# Patient Record
Sex: MALE | URBAN - METROPOLITAN AREA
[De-identification: names, ages, dates, MRNs, and addresses within clinical notes are randomized per-mention and may not be internally consistent; named-entity substitution may affect disease eponyms.]

---

## 2018-04-19 ENCOUNTER — APPOINTMENT (RX ONLY)
Dept: URBAN - METROPOLITAN AREA CLINIC 7 | Facility: CLINIC | Age: 33
Setting detail: DERMATOLOGY
End: 2018-04-19

## 2018-04-19 DIAGNOSIS — L66.2 FOLLICULITIS DECALVANS: ICD-10-CM

## 2018-04-19 DIAGNOSIS — Z71.89 OTHER SPECIFIED COUNSELING: ICD-10-CM

## 2018-04-19 DIAGNOSIS — Z87.891 PERSONAL HISTORY OF NICOTINE DEPENDENCE: ICD-10-CM

## 2018-04-19 DIAGNOSIS — L81.4 OTHER MELANIN HYPERPIGMENTATION: ICD-10-CM

## 2018-04-19 PROBLEM — L30.9 DERMATITIS, UNSPECIFIED: Status: ACTIVE | Noted: 2018-04-19

## 2018-04-19 PROCEDURE — 99202 OFFICE O/P NEW SF 15 MIN: CPT | Mod: 25

## 2018-04-19 PROCEDURE — 11100: CPT

## 2018-04-19 PROCEDURE — ? BIOPSY BY PUNCH METHOD

## 2018-04-19 PROCEDURE — ? SUNSCREEN RECOMMENDATIONS

## 2018-04-19 PROCEDURE — ? COUNSELING

## 2018-04-19 ASSESSMENT — LOCATION DETAILED DESCRIPTION DERM
LOCATION DETAILED: RIGHT CENTRAL PARIETAL SCALP
LOCATION DETAILED: RIGHT DISTAL POSTERIOR UPPER ARM
LOCATION DETAILED: LEFT DISTAL POSTERIOR UPPER ARM

## 2018-04-19 ASSESSMENT — LOCATION ZONE DERM
LOCATION ZONE: ARM
LOCATION ZONE: SCALP

## 2018-04-19 ASSESSMENT — LOCATION SIMPLE DESCRIPTION DERM
LOCATION SIMPLE: LEFT UPPER ARM
LOCATION SIMPLE: RIGHT UPPER ARM
LOCATION SIMPLE: SCALP

## 2018-04-19 NOTE — PROCEDURE: COUNSELING
Detail Level: Zone
Detail Level: Detailed
Quality 226: Preventive Care And Screening: Tobacco Use: Screening And Cessation Intervention: Patient screened for tobacco and is a smoker AND received Cessation Counseling

## 2018-04-19 NOTE — PROCEDURE: BIOPSY BY PUNCH METHOD
Anesthesia Type: 1% lidocaine with epinephrine
Wound Care: Bacitracin
Home Suture Removal Text: Patient was provided a home suture removal kit and will remove their sutures at home.  If they have any questions or difficulties they will call the office.
Size Of Lesion In Cm (Optional): 0
Post-Care Instructions: I reviewed with the patient in detail post-care instructions. Patient is to keep the biopsy site dry overnight, and then apply bacitracin twice daily until healed. Patient may apply hydrogen peroxide soaks to remove any crusting.
Bill 50556 For Specimen Handling/Conveyance To Laboratory?: no
Epidermal Sutures: 4-0 Ethilon
Notification Instructions: Patient will be notified of biopsy results. However, patient instructed to call the office if not contacted within 2 weeks.
Hemostasis: None
Consent: Written consent was obtained and risks were reviewed including but not limited to scarring, infection, bleeding, scabbing, incomplete removal, nerve damage and allergy to anesthesia.
Punch Size In Mm: 4
Was A Bandage Applied: Yes
Lab Facility: [id number]
Biopsy Type: H and E
Lab: [id number]
Anesthesia Volume In Cc: 0.5
Billing Type: Third-Party Bill
Detail Level: Detailed
Suture Removal: 14 days
Dressing: bandage

## 2018-04-24 ENCOUNTER — APPOINTMENT (RX ONLY)
Dept: URBAN - METROPOLITAN AREA CLINIC 7 | Facility: CLINIC | Age: 33
Setting detail: DERMATOLOGY
End: 2018-04-24

## 2018-04-24 DIAGNOSIS — Z48.02 ENCOUNTER FOR REMOVAL OF SUTURES: ICD-10-CM

## 2018-04-24 PROBLEM — L20.84 INTRINSIC (ALLERGIC) ECZEMA: Status: ACTIVE | Noted: 2018-04-24

## 2018-04-24 PROBLEM — L85.3 XEROSIS CUTIS: Status: ACTIVE | Noted: 2018-04-24

## 2018-04-24 PROBLEM — L55.1 SUNBURN OF SECOND DEGREE: Status: ACTIVE | Noted: 2018-04-24

## 2018-04-24 PROBLEM — L29.8 OTHER PRURITUS: Status: ACTIVE | Noted: 2018-04-24

## 2018-04-24 PROCEDURE — ? SUTURE REMOVAL (GLOBAL PERIOD)

## 2018-04-24 PROCEDURE — 99024 POSTOP FOLLOW-UP VISIT: CPT

## 2018-04-24 PROCEDURE — ? COUNSELING

## 2018-04-24 ASSESSMENT — LOCATION SIMPLE DESCRIPTION DERM: LOCATION SIMPLE: SCALP

## 2018-04-24 ASSESSMENT — LOCATION ZONE DERM: LOCATION ZONE: SCALP

## 2018-04-24 ASSESSMENT — LOCATION DETAILED DESCRIPTION DERM: LOCATION DETAILED: RIGHT CENTRAL PARIETAL SCALP

## 2018-04-24 NOTE — PROCEDURE: SUTURE REMOVAL (GLOBAL PERIOD)
Detail Level: Detailed
Add 26786 Cpt? (Important Note: In 2017 The Use Of 52499 Is Being Tracked By Cms To Determine Future Global Period Reimbursement For Global Periods): yes
Body Location Override (Optional - Billing Will Still Be Based On Selected Body Map Location If Applicable): right central Parietal scalp

## 2018-05-02 ENCOUNTER — RX ONLY (OUTPATIENT)
Age: 33
Setting detail: RX ONLY
End: 2018-05-02

## 2018-05-02 RX ORDER — CLINDAMYCIN PHOSPHATE 10 MG/ML
1 SOLUTION TOPICAL BID X 2 WEEKS
Qty: 1 | Refills: 3 | Status: ERX

## 2018-05-02 RX ORDER — DOXYCYCLINE HYCLATE 100 MG/1
1 TABLET, COATED ORAL BID
Qty: 60 | Refills: 0 | Status: ERX | COMMUNITY
Start: 2018-05-02

## 2019-07-31 NOTE — PROGRESS NOTES
Subjective:       Patient ID: Lisa Garcia is a 34 y.o. male with no significant PMH who presents today to establish care.    Chief Complaint: Hernia (screening and referrals for derm and ortho) and Ankle Injury (right ankle)    HPI   Patient with complaints of scalp condition over 10 years - folliculitis - seen by multiple Dermatologist and biopsies done.  Uses Clindamycin topical.  Wants to see another Dermatologist.  Also with a genital warts and has a new lesion.      Has right ankle pain since high school due to sports - susceptible to easy injury.  Will refer to Ortho.    Has chronic LBP with right sciatica.  He wants to see [facility].    Has right testicular pain after wearing a harness for rock climbing.      Patient denies f/c, n/v/d.  No chest pain or SOB.  No abdominal pain or dysuria.  No headaches or change in vision.  No dizziness.  No significant  weight gain or weight loss.  Remaining ROS negative.    Review of Systems   Constitutional: Negative for activity change, appetite change, diaphoresis, fatigue and unexpected weight change.   HENT: Negative for congestion, ear pain, hearing loss, rhinorrhea, sinus pressure, sore throat, trouble swallowing and voice change.    Eyes: Negative for photophobia, pain, discharge and visual disturbance.   Respiratory: Negative for cough, chest tightness, shortness of breath and wheezing.    Cardiovascular: Negative for chest pain, palpitations and leg swelling.   Gastrointestinal: Negative for abdominal pain, blood in stool, constipation, diarrhea, nausea and vomiting.   Endocrine: Negative for cold intolerance, heat intolerance, polydipsia, polyphagia and polyuria.   Genitourinary: Positive for testicular pain (right). Negative for decreased urine volume, difficulty urinating, discharge, dysuria, flank pain, hematuria, scrotal swelling and urgency.   Musculoskeletal: Positive for arthralgias (right ankle) and back pain. Negative for joint swelling, myalgias  and neck pain.   Skin: Positive for rash (scalp).   Neurological: Negative for dizziness, tremors, syncope, weakness, numbness and headaches.   Hematological: Does not bruise/bleed easily.   Psychiatric/Behavioral: Negative for agitation, confusion, dysphoric mood and sleep disturbance. The patient is not nervous/anxious.        Objective:      Physical Exam   Constitutional: He is oriented to person, place, and time. He appears well-developed and well-nourished.   HENT:   Head: Normocephalic.   Nose: Nose normal.   Mouth/Throat: Oropharynx is clear and moist.   Eyes: Pupils are equal, round, and reactive to light. Conjunctivae and EOM are normal. Right eye exhibits no discharge. Left eye exhibits no discharge. No scleral icterus.   Neck: Normal range of motion. Neck supple. No thyromegaly present.   Cardiovascular: Normal rate, regular rhythm, normal heart sounds and intact distal pulses. Exam reveals no gallop and no friction rub.   No murmur heard.  Pulmonary/Chest: Effort normal and breath sounds normal. No respiratory distress. He has no wheezes. He has no rales.   Abdominal: Soft. Bowel sounds are normal. He exhibits no distension. There is no tenderness. There is no rebound and no guarding.   Genitourinary: Penis normal.   Musculoskeletal: Normal range of motion. He exhibits no edema.   Lymphadenopathy:     He has no cervical adenopathy.   Neurological: He is alert and oriented to person, place, and time. No cranial nerve deficit or sensory deficit.   Skin: Skin is warm and dry. No rash noted. No erythema.   Folliculitis on scalp with MBP.   Psychiatric: He has a normal mood and affect. His behavior is normal. Thought content normal.       Assessment:       1. Annual physical exam    2. Alopecia of scalp    3. Chronic pain of right ankle    4. Chronic bilateral low back pain with right-sided sciatica    5. Class 1 obesity with body mass index (BMI) of 31.0 to 31.9 in adult, unspecified obesity type,  unspecified whether serious comorbidity present    6. Dissecting folliculitis of scalp    7. Screen for STD (sexually transmitted disease)    8. Right testicular pain        Plan:   -Today's Visit - patient is awake and alert.    -Nutrition - Obesity - BMI 31.66 today.  Discussed diet modification and exercise.    -Derm - Patient with complaints of scalp condition over 10 years - folliculitis - seen by multiple Dermatologist and biopsies done.  Uses Clindamycin topical.  Has hair loss associated with this.  Wants to see another Dermatologist - will refer to Nat.  Also with a genital warts and has a new lesion.      -MSK - Has right ankle pain since high school due to sports - susceptible to easy injury.  Will refer to Ortho - wants to see Martha.    Has chronic LBP with right sciatica.  He wants to see Back [facility] Sp[facility].  He has gone to ED several times with pain.    - - Right Testicular pain - Went rock climbing a week ago and has pain on right inguinal area after wearing harness.  Exam negative.  Will check US.    We discussed STD screening - will draw today.    -HCM - Discussed Flu and Tdap (2015) Vaccines.      -Follow Up - 6 months

## 2019-08-02 ENCOUNTER — OFFICE VISIT (OUTPATIENT)
Dept: PRIMARY CARE CLINIC | Facility: CLINIC | Age: 34
End: 2019-08-02
Payer: COMMERCIAL

## 2019-08-02 VITALS
SYSTOLIC BLOOD PRESSURE: 112 MMHG | HEART RATE: 84 BPM | HEIGHT: 73 IN | DIASTOLIC BLOOD PRESSURE: 82 MMHG | BODY MASS INDEX: 31.81 KG/M2 | WEIGHT: 240 LBS | OXYGEN SATURATION: 97 %

## 2019-08-02 DIAGNOSIS — L66.3 DISSECTING FOLLICULITIS OF SCALP: ICD-10-CM

## 2019-08-02 DIAGNOSIS — Z11.3 SCREEN FOR STD (SEXUALLY TRANSMITTED DISEASE): ICD-10-CM

## 2019-08-02 DIAGNOSIS — E66.9 CLASS 1 OBESITY WITH BODY MASS INDEX (BMI) OF 31.0 TO 31.9 IN ADULT, UNSPECIFIED OBESITY TYPE, UNSPECIFIED WHETHER SERIOUS COMORBIDITY PRESENT: ICD-10-CM

## 2019-08-02 DIAGNOSIS — Z00.00 ANNUAL PHYSICAL EXAM: Primary | ICD-10-CM

## 2019-08-02 DIAGNOSIS — N50.811 RIGHT TESTICULAR PAIN: ICD-10-CM

## 2019-08-02 DIAGNOSIS — G89.29 CHRONIC BILATERAL LOW BACK PAIN WITH RIGHT-SIDED SCIATICA: ICD-10-CM

## 2019-08-02 DIAGNOSIS — L65.9 ALOPECIA OF SCALP: ICD-10-CM

## 2019-08-02 DIAGNOSIS — G89.29 CHRONIC PAIN OF RIGHT ANKLE: ICD-10-CM

## 2019-08-02 DIAGNOSIS — M25.571 CHRONIC PAIN OF RIGHT ANKLE: ICD-10-CM

## 2019-08-02 DIAGNOSIS — M54.41 CHRONIC BILATERAL LOW BACK PAIN WITH RIGHT-SIDED SCIATICA: ICD-10-CM

## 2019-08-02 PROCEDURE — 3008F PR BODY MASS INDEX (BMI) DOCUMENTED: ICD-10-PCS | Mod: CPTII,S$GLB,, | Performed by: INTERNAL MEDICINE

## 2019-08-02 PROCEDURE — 3008F BODY MASS INDEX DOCD: CPT | Mod: CPTII,S$GLB,, | Performed by: INTERNAL MEDICINE

## 2019-08-02 PROCEDURE — 99204 PR OFFICE/OUTPT VISIT, NEW, LEVL IV, 45-59 MIN: ICD-10-PCS | Mod: S$GLB,,, | Performed by: INTERNAL MEDICINE

## 2019-08-02 PROCEDURE — 99204 OFFICE O/P NEW MOD 45 MIN: CPT | Mod: S$GLB,,, | Performed by: INTERNAL MEDICINE

## 2019-08-02 PROCEDURE — 99999 PR PBB SHADOW E&M-NEW PATIENT-LVL V: ICD-10-PCS | Mod: PBBFAC,,, | Performed by: INTERNAL MEDICINE

## 2019-08-02 PROCEDURE — 99999 PR PBB SHADOW E&M-NEW PATIENT-LVL V: CPT | Mod: PBBFAC,,, | Performed by: INTERNAL MEDICINE

## 2019-08-02 RX ORDER — CLINDAMYCIN PHOSPHATE 11.9 MG/ML
SOLUTION TOPICAL 2 TIMES DAILY
COMMUNITY
End: 2019-08-02 | Stop reason: SDUPTHER

## 2019-08-02 RX ORDER — CLINDAMYCIN PHOSPHATE 11.9 MG/ML
SOLUTION TOPICAL 2 TIMES DAILY
Qty: 1 BOTTLE | Refills: 3 | Status: SHIPPED | OUTPATIENT
Start: 2019-08-02 | End: 2021-06-11 | Stop reason: SDUPTHER

## 2019-08-02 NOTE — PATIENT INSTRUCTIONS
Your exam was overall normal today.    Your blood pressure was good.    I will order routine fasting labs today - at least 9 hours of fasting.    We will give you the Flu Vaccine today.  We will give you the Tdap Vaccine today.    I will refer you to Dermatology.  I will refer your To Martha Jaffe, for your right ankle.  I will refer you to [facility].    Return in 12 months - sooner if needed.  Please come at least 15-20 minutes before your scheduled appointment time.

## 2019-08-03 ENCOUNTER — HOSPITAL ENCOUNTER (OUTPATIENT)
Dept: RADIOLOGY | Facility: OTHER | Age: 34
Discharge: HOME | End: 2019-08-03
Attending: INTERNAL MEDICINE
Payer: COMMERCIAL

## 2019-08-03 DIAGNOSIS — N50.811 RIGHT TESTICULAR PAIN: ICD-10-CM

## 2019-08-03 PROCEDURE — 76870 US EXAM SCROTUM: CPT | Mod: TC

## 2019-08-03 PROCEDURE — 76870 US SCROTUM AND TESTICLES: ICD-10-PCS | Mod: 26,,, | Performed by: RADIOLOGY

## 2019-08-03 PROCEDURE — 76870 US EXAM SCROTUM: CPT | Mod: 26,,, | Performed by: RADIOLOGY

## 2019-08-04 ENCOUNTER — PATIENT MESSAGE (OUTPATIENT)
Dept: PRIMARY CARE CLINIC | Facility: CLINIC | Age: 34
End: 2019-08-04

## 2019-08-06 ENCOUNTER — PATIENT MESSAGE (OUTPATIENT)
Dept: PRIMARY CARE CLINIC | Facility: CLINIC | Age: 34
End: 2019-08-06

## 2019-08-06 ENCOUNTER — LAB VISIT (OUTPATIENT)
Dept: LAB | Facility: HOSPITAL | Age: 34
End: 2019-08-06
Attending: INTERNAL MEDICINE
Payer: COMMERCIAL

## 2019-08-06 DIAGNOSIS — Z11.3 SCREEN FOR STD (SEXUALLY TRANSMITTED DISEASE): ICD-10-CM

## 2019-08-06 DIAGNOSIS — Z00.00 ANNUAL PHYSICAL EXAM: ICD-10-CM

## 2019-08-06 LAB
BILIRUB UR QL STRIP: NEGATIVE
C TRACH DNA SPEC QL NAA+PROBE: NOT DETECTED
CLARITY UR REFRACT.AUTO: ABNORMAL
COLOR UR AUTO: YELLOW
GLUCOSE UR QL STRIP: NEGATIVE
HGB UR QL STRIP: NEGATIVE
KETONES UR QL STRIP: NEGATIVE
LEUKOCYTE ESTERASE UR QL STRIP: NEGATIVE
N GONORRHOEA DNA SPEC QL NAA+PROBE: NOT DETECTED
NITRITE UR QL STRIP: NEGATIVE
PH UR STRIP: 7 [PH] (ref 5–8)
PROT UR QL STRIP: NEGATIVE
SP GR UR STRIP: 1.02 (ref 1–1.03)
URN SPEC COLLECT METH UR: ABNORMAL

## 2019-08-06 PROCEDURE — 81003 URINALYSIS AUTO W/O SCOPE: CPT

## 2019-08-06 PROCEDURE — 87491 CHLMYD TRACH DNA AMP PROBE: CPT

## 2019-08-07 ENCOUNTER — PATIENT MESSAGE (OUTPATIENT)
Dept: PRIMARY CARE CLINIC | Facility: CLINIC | Age: 34
End: 2019-08-07

## 2019-08-09 ENCOUNTER — TELEPHONE (OUTPATIENT)
Dept: ORTHOPEDICS | Facility: CLINIC | Age: 34
End: 2019-08-09

## 2019-08-09 NOTE — TELEPHONE ENCOUNTER
JAIMEE with patient that his appt on 8/19 was scheduled incorrectly and he will need to call back and reschedule to be seen in a new patient time slot. [phone].    Karina Pabon  Clinical Assistant to Dr. Randi Thomas

## 2019-08-15 ENCOUNTER — TELEPHONE (OUTPATIENT)
Dept: SPINE | Facility: CLINIC | Age: 34
End: 2019-08-15

## 2019-08-15 DIAGNOSIS — M54.5 LOW BACK PAIN, UNSPECIFIED BACK PAIN LATERALITY, UNSPECIFIED CHRONICITY, WITH SCIATICA PRESENCE UNSPECIFIED: Primary | ICD-10-CM

## 2019-08-15 NOTE — TELEPHONE ENCOUNTER
----- Message from Phyllis Montana PA-C sent at 8/15/2019  9:50 AM CDT -----  He needs lumbar xrays prior to his visit tomorrow. Please get today if possible.

## 2019-08-15 NOTE — PROGRESS NOTES
Subjective:      Patient ID: Lisa Garcia is a 34 y.o. male.    Chief Complaint: Low-back Pain      HPI  ([place])    History of chronic back pain.     He has chronic constant LBP x years with intermittent right lateral leg pain to his calf. No left leg pain. LBP > right leg pain. Pain is tightness in nature. Worse with prolonged sitting/laying flat. Pain is worse when he gets up in the morning and better as he moves around. He rates his pain as a 6 on a scale of 1-10. He has intermittent numbness and tingling in his right leg. No weakness. No specific alleviating factors, pain varies. Had severe flare up a few months ago- seen in ED and given injections and medications.     No PT, lumbar injections, or surgery on his back. He's tried some stretching and yoga but has not kept it up. He takes prn motrin with some improvement.       Review of Systems   Constitution: Positive for weight gain. Negative for fever, malaise/fatigue, night sweats and weight loss.   HENT: Negative for hearing loss, nosebleeds and odynophagia.    Eyes: Negative for blurred vision and double vision.   Cardiovascular: Negative for chest pain, irregular heartbeat and palpitations.   Respiratory: Negative for cough, hemoptysis, shortness of breath and wheezing.    Endocrine: Negative for cold intolerance and polydipsia.   Hematologic/Lymphatic: Does not bruise/bleed easily.   Skin: Negative for dry skin, poor wound healing, rash and suspicious lesions.   Musculoskeletal: Positive for back pain.        See HPI for pertinent positives.   Gastrointestinal: Negative for bloating, abdominal pain, constipation, diarrhea, hematochezia, melena, nausea and vomiting.   Genitourinary: Negative for bladder incontinence, dysuria, hematuria, hesitancy and incomplete emptying.   Neurological: Negative for disturbances in coordination, dizziness, focal weakness, headaches, loss of balance, numbness, paresthesias, seizures and weakness.    Psychiatric/Behavioral: Negative for depression and hallucinations. The patient is not nervous/anxious.            Objective:        General: Lisa is well-developed, well-nourished, appears stated age, in no acute distress, alert and oriented to time, place and person.     General    Vitals reviewed.  Constitutional: He is oriented to person, place, and time. He appears well-developed and well-nourished.   Pulmonary/Chest: Effort normal.   Abdominal: He exhibits no distension.   Neurological: He is alert and oriented to person, place, and time.   Psychiatric: He has a normal mood and affect. His behavior is normal. Judgment and thought content normal.           Gait: normal, tandem walking is normal and he is able to heel/toe stand.     On exam of the lumbar spine, Inspection of back is normal, No tenderness noted    Skin in lumbar region is warm to the touch without visible rashes.     muscle tone normal without spasm, limited range of motion with pain  Pain in flexion.    Strength testing of the bilateral LEs shows  Right hip abduction:  +5/5  Left hip abduction:  +5/5  Right hip flexion:  +5/5  Left hip flexion:  +5/5  Right hip extensors:  +5/5  Left hip extensors:  +5/5  Right quadriceps:  +5/5  Left quadriceps:  +5/5  Right hamstring:  +5/5  Left hamstring:  +5/5  Right dorsiflexion:  +5/5  Left dorsiflexion:  +5/5  Right plantar flexion:  +5/5  Left plantar flexion:  +5/5   Right EHL:  +5/5   Left EHL:  +5/5    negative clonus of bilateral LEs.     negative straight leg raise on bilateral LEs.     DTRs:  Right patellar:  +2     Left patellar:  +2  Right achilles:  +2   Left achilles:  +2    Sensation is grossly intact in L2, L3, L4, L5, and S1 distribution.    Right hip has mild lateral hip pain with IR/ER.  No tenderness over greater trochanteric bursa.  Left hip has no pain with IR/ER.  No tenderness over greater trochanteric bursa.     On exam of bilateral UEs, patient has full painfree ROM with no  signs of clubbing, laxity, cyanosis, edema, instability, weakness, or tenderness.         Assessment:       1. Chronic bilateral low back pain with right-sided sciatica    2. Myofascial muscle pain    3. Thoracic and lumbosacral neuritis           Plan:       Orders Placed This Encounter    Ambulatory consult to [facility]       Chronic constant LBP x years with intermittent right lateral leg pain to his calf. No left leg pain. LBP > right leg pain. No imaging available, but pain likely due to underlying degeneration. Treatment options reviewed with patient and following plan made:     - Lumbar XRs on his way out. Will put results on [facility].   - Referral to [facility] program.   - Continue on prn OTC motrin with food.   - Email if he has flare ups.   - Follow up prn at his request.   - Message to Dr. Thomas's staff to call him to reschedule his appointment for right ankle pain.     ADDENDUM 8/16/19:  X-rays of lumbar spine (AP/lat/flex/ext) dated 8/16/19 are personally reviewed and show moderate DDD L4-L5 and mild DDD L5-S1.     Follow-up: Follow up if symptoms worsen or fail to improve. If there are any questions prior to this, the patient was instructed to contact the office.

## 2019-08-16 ENCOUNTER — TELEPHONE (OUTPATIENT)
Dept: SPORTS MEDICINE | Facility: CLINIC | Age: 34
End: 2019-08-16

## 2019-08-16 ENCOUNTER — HOSPITAL ENCOUNTER (OUTPATIENT)
Dept: RADIOLOGY | Facility: OTHER | Age: 34
Discharge: HOME | End: 2019-08-16
Attending: PHYSICIAN ASSISTANT
Payer: COMMERCIAL

## 2019-08-16 ENCOUNTER — OFFICE VISIT (OUTPATIENT)
Dept: SPINE | Facility: CLINIC | Age: 34
End: 2019-08-16
Payer: COMMERCIAL

## 2019-08-16 VITALS
HEART RATE: 65 BPM | WEIGHT: 239 LBS | SYSTOLIC BLOOD PRESSURE: 136 MMHG | BODY MASS INDEX: 31.68 KG/M2 | HEIGHT: 73 IN | DIASTOLIC BLOOD PRESSURE: 80 MMHG

## 2019-08-16 DIAGNOSIS — M79.18 MYOFASCIAL MUSCLE PAIN: ICD-10-CM

## 2019-08-16 DIAGNOSIS — M54.41 CHRONIC BILATERAL LOW BACK PAIN WITH RIGHT-SIDED SCIATICA: Primary | ICD-10-CM

## 2019-08-16 DIAGNOSIS — M54.5 LOW BACK PAIN, UNSPECIFIED BACK PAIN LATERALITY, UNSPECIFIED CHRONICITY, WITH SCIATICA PRESENCE UNSPECIFIED: ICD-10-CM

## 2019-08-16 DIAGNOSIS — G89.29 CHRONIC BILATERAL LOW BACK PAIN WITH RIGHT-SIDED SCIATICA: Primary | ICD-10-CM

## 2019-08-16 DIAGNOSIS — M54.17 THORACIC AND LUMBOSACRAL NEURITIS: ICD-10-CM

## 2019-08-16 DIAGNOSIS — M54.14 THORACIC AND LUMBOSACRAL NEURITIS: ICD-10-CM

## 2019-08-16 PROCEDURE — 3008F PR BODY MASS INDEX (BMI) DOCUMENTED: ICD-10-PCS | Mod: CPTII,S$GLB,, | Performed by: PHYSICIAN ASSISTANT

## 2019-08-16 PROCEDURE — 72100 X-RAY EXAM L-S SPINE 2/3 VWS: CPT | Mod: TC,FY

## 2019-08-16 PROCEDURE — 72100 X-RAY EXAM L-S SPINE 2/3 VWS: CPT | Mod: 26,,, | Performed by: RADIOLOGY

## 2019-08-16 PROCEDURE — 99999 PR PBB SHADOW E&M-EST. PATIENT-LVL IV: CPT | Mod: PBBFAC,,, | Performed by: PHYSICIAN ASSISTANT

## 2019-08-16 PROCEDURE — 3008F BODY MASS INDEX DOCD: CPT | Mod: CPTII,S$GLB,, | Performed by: PHYSICIAN ASSISTANT

## 2019-08-16 PROCEDURE — 99203 PR OFFICE/OUTPT VISIT, NEW, LEVL III, 30-44 MIN: ICD-10-PCS | Mod: S$GLB,,, | Performed by: PHYSICIAN ASSISTANT

## 2019-08-16 PROCEDURE — 99203 OFFICE O/P NEW LOW 30 MIN: CPT | Mod: S$GLB,,, | Performed by: PHYSICIAN ASSISTANT

## 2019-08-16 PROCEDURE — 72120 XR LUMBAR SPINE AP AND LAT WITH FLEX/EXT: ICD-10-PCS | Mod: 26,,, | Performed by: RADIOLOGY

## 2019-08-16 PROCEDURE — 72100 XR LUMBAR SPINE AP AND LAT WITH FLEX/EXT: ICD-10-PCS | Mod: 26,,, | Performed by: RADIOLOGY

## 2019-08-16 PROCEDURE — 72120 X-RAY BEND ONLY L-S SPINE: CPT | Mod: 26,,, | Performed by: RADIOLOGY

## 2019-08-16 PROCEDURE — 99999 PR PBB SHADOW E&M-EST. PATIENT-LVL IV: ICD-10-PCS | Mod: PBBFAC,,, | Performed by: PHYSICIAN ASSISTANT

## 2019-08-16 RX ORDER — IBUPROFEN 200 MG
200 TABLET ORAL EVERY 6 HOURS PRN
COMMUNITY
End: 2021-02-05

## 2019-08-16 NOTE — TELEPHONE ENCOUNTER
Spoke to pt and scheduled appt for the first available at [facility] which was 9/9. Pt declined earlier appts at other locations. Informed pt to arrive 30 minutes early for XR.    Karina Pabon  Clinical Assistant to Dr. Randi Thomas

## 2019-08-16 NOTE — TELEPHONE ENCOUNTER
----- Message from Phyllis Montana PA-C sent at 8/16/2019  8:13 AM CDT -----  Please call him to reschedule him with Dr. Thomas for right ankle pain.     Thanks!

## 2019-08-16 NOTE — PATIENT INSTRUCTIONS
It was great to meet you today!    I want to get some xrays of your lower back and will put the results on [facility]. I want you to go to the [facility] program here. It is an awesome program and I think you will love it.     I sent a message to Dr. Thomas's office and they should be calling you about rescheduling your appointment for ankle pain.     Email me if you have a flare up. Please stay in touch and let me know if you need anything.     Phyllis

## 2019-08-16 NOTE — LETTER
August 16, 2019      Yeison Stephens MD  [address]   [address]  [facility] [address]           [facility] [address] [facility] [address]  [address], [address]  [facility] [address]  Phone: [phone]  Fax: [phone]          Patient: Lisa Garcia   MR Number: [id number]   YOB: 1985   Date of Visit: 8/16/2019       Dear Dr. Yeison Stephens:    Thank you for referring Lisa Garcia to me for evaluation. Attached you will find relevant portions of my assessment and plan of care.    If you have questions, please do not hesitate to call me. I look forward to following Lisa Garcia along with you.    Sincerely,    OBDULIA Sheppardosure  CC:  No Recipients    If you would like to receive this communication electronically, please contact [email or web address] or [phone] to request more information on [employer] Link access.    For providers and/or their staff who would like to refer a patient to [facility], please contact us through our one-stop-shop provider referral line, [facility], at [phone].    If you feel you have received this communication in error or would no longer like to receive these types of communications, please e-mail [email or web address]

## 2019-08-17 ENCOUNTER — PATIENT MESSAGE (OUTPATIENT)
Dept: SPINE | Facility: CLINIC | Age: 34
End: 2019-08-17

## 2019-09-06 ENCOUNTER — HOSPITAL ENCOUNTER (OUTPATIENT)
Dept: RADIOLOGY | Facility: HOSPITAL | Age: 34
Discharge: HOME | End: 2019-09-06
Attending: NEUROMUSCULOSKELETAL MEDICINE & OMM
Payer: COMMERCIAL

## 2019-09-06 ENCOUNTER — OFFICE VISIT (OUTPATIENT)
Dept: SPORTS MEDICINE | Facility: CLINIC | Age: 34
End: 2019-09-06
Payer: COMMERCIAL

## 2019-09-06 VITALS
BODY MASS INDEX: 31.44 KG/M2 | HEART RATE: 76 BPM | DIASTOLIC BLOOD PRESSURE: 81 MMHG | WEIGHT: 245 LBS | SYSTOLIC BLOOD PRESSURE: 126 MMHG | HEIGHT: 74 IN

## 2019-09-06 DIAGNOSIS — M25.571 RIGHT ANKLE PAIN, UNSPECIFIED CHRONICITY: ICD-10-CM

## 2019-09-06 DIAGNOSIS — G89.29 CHRONIC PAIN OF RIGHT ANKLE: Primary | ICD-10-CM

## 2019-09-06 DIAGNOSIS — M25.371 ANKLE INSTABILITY, RIGHT: ICD-10-CM

## 2019-09-06 DIAGNOSIS — M99.06 SOMATIC DYSFUNCTION OF LOWER EXTREMITY: ICD-10-CM

## 2019-09-06 DIAGNOSIS — M19.171 POST-TRAUMATIC OSTEOARTHRITIS OF RIGHT ANKLE: ICD-10-CM

## 2019-09-06 DIAGNOSIS — M25.571 CHRONIC PAIN OF RIGHT ANKLE: Primary | ICD-10-CM

## 2019-09-06 PROCEDURE — 98925 PR OSTEOPATHIC MANIP,1-2 BODY REGN: ICD-10-PCS | Mod: S$GLB,,, | Performed by: NEUROMUSCULOSKELETAL MEDICINE & OMM

## 2019-09-06 PROCEDURE — 99999 PR PBB SHADOW E&M-EST. PATIENT-LVL IV: ICD-10-PCS | Mod: PBBFAC,,, | Performed by: NEUROMUSCULOSKELETAL MEDICINE & OMM

## 2019-09-06 PROCEDURE — 99204 PR OFFICE/OUTPT VISIT, NEW, LEVL IV, 45-59 MIN: ICD-10-PCS | Mod: 25,S$GLB,, | Performed by: NEUROMUSCULOSKELETAL MEDICINE & OMM

## 2019-09-06 PROCEDURE — 73610 X-RAY EXAM OF ANKLE: CPT | Mod: TC,FY,PO,RT

## 2019-09-06 PROCEDURE — 3008F BODY MASS INDEX DOCD: CPT | Mod: CPTII,S$GLB,, | Performed by: NEUROMUSCULOSKELETAL MEDICINE & OMM

## 2019-09-06 PROCEDURE — 3008F PR BODY MASS INDEX (BMI) DOCUMENTED: ICD-10-PCS | Mod: CPTII,S$GLB,, | Performed by: NEUROMUSCULOSKELETAL MEDICINE & OMM

## 2019-09-06 PROCEDURE — 73610 X-RAY EXAM OF ANKLE: CPT | Mod: 26,RT,, | Performed by: RADIOLOGY

## 2019-09-06 PROCEDURE — 99204 OFFICE O/P NEW MOD 45 MIN: CPT | Mod: 25,S$GLB,, | Performed by: NEUROMUSCULOSKELETAL MEDICINE & OMM

## 2019-09-06 PROCEDURE — 99999 PR PBB SHADOW E&M-EST. PATIENT-LVL IV: CPT | Mod: PBBFAC,,, | Performed by: NEUROMUSCULOSKELETAL MEDICINE & OMM

## 2019-09-06 PROCEDURE — 73610 XR ANKLE COMPLETE 3 VIEW RIGHT: ICD-10-PCS | Mod: 26,RT,, | Performed by: RADIOLOGY

## 2019-09-06 PROCEDURE — 98925 OSTEOPATH MANJ 1-2 REGIONS: CPT | Mod: S$GLB,,, | Performed by: NEUROMUSCULOSKELETAL MEDICINE & OMM

## 2019-09-06 NOTE — LETTER
September 6, 2019      Yeison Stephens MD  [address]  [address]  [facility] [address]           [facility]  [address]  [facility] [address]  Phone: [phone]          Patient: Lisa Garcia   MR Number: [id number]   YOB: 1985   Date of Visit: 9/6/2019       Dear Dr. Yeison Stephens:    Thank you for referring Lisa Garcia to me for evaluation. Attached you will find relevant portions of my assessment and plan of care.    If you have questions, please do not hesitate to call me. I look forward to following Lisa Garcia along with you.    Sincerely,    Randi Thomas,     Enclosure  CC:  No Recipients    If you would like to receive this communication electronically, please contact [email or web address] or [phone] to request more information on [email or web address] Link access.    For providers and/or their staff who would like to refer a patient to [facility], please contact us through our one-stop-shop provider referral line, [facility], at [phone].    If you feel you have received this communication in error or would no longer like to receive these types of communications, please e-mail [email or web address]

## 2019-09-06 NOTE — PROGRESS NOTES
Subjective:     Lisa Garcia     Chief Complaint   Patient presents with    Right Ankle - Pain       HPI    Lisa is a 34 y.o. male coming in today for right ankle pain that began many year(s) ago, referred by Dr. Stephens. Pt. describes the pain as  0/10 currently. The pain is very minimal, the ankle just feels unstable. There was a fall/injury/ or trauma associated with the onset of symptoms. Pt notes history of multiple ankle sprains on the right, since high school, and the ankle feels very unstable.  He notes that 1 of his ankle injuries back in high school resulted in a subluxation injury as well.  He will sprain his ankle just walking down the street. The pain is better with weight loss, strengthening and worse with activity, walking. Pt. Denies any other musculoskeletal complaints at this time. He is seen by Phyllis Montana for back pain and starts PT soon for this.     Joint instability? yes  Mechanical locking/clicking?  yes  Affecting ADL's? yes  Affecting sleep? no    Occupation: [employer]     Review of Systems   Constitutional: Negative for chills and fever.   HENT: Negative for hearing loss and tinnitus.    Eyes: Negative for blurred vision and photophobia.   Respiratory: Negative for cough and shortness of breath.    Cardiovascular: Negative for chest pain and leg swelling.   Gastrointestinal: Negative for abdominal pain, heartburn, nausea and vomiting.   Genitourinary: Negative for dysuria and hematuria.   Musculoskeletal: Positive for joint pain. Negative for back pain, falls, myalgias and neck pain.   Skin: Negative for rash.   Neurological: Negative for dizziness, tingling, focal weakness, weakness and headaches.   Endo/Heme/Allergies: Negative for environmental allergies. Does not bruise/bleed easily.   Psychiatric/Behavioral: Negative for depression. The patient is not nervous/anxious.        PAST MEDICAL HISTORY:   Past Medical History:   Diagnosis Date    Alopecia of scalp 2009    Medical  history non-contributory      PAST SURGICAL HISTORY:   Past Surgical History:   Procedure Laterality Date    APPENDECTOMY       FAMILY HISTORY:   Family History   Problem Relation Age of Onset    Osteoporosis Father     Hypothyroidism Mother     Lung cancer Paternal Grandfather      SOCIAL HISTORY:   Social History     Socioeconomic History    Marital status: Significant Other     Spouse name: Not on file    Number of children: 0    Years of education: Not on file    Highest education level: Not on file   Occupational History    Occupation:    Social Needs    Financial resource strain: Not on file    Food insecurity:     Worry: Not on file     Inability: Not on file    Transportation needs:     Medical: Not on file     Non-medical: Not on file   Tobacco Use    Smoking status: Current Some Day Smoker     Types: Cigars    Smokeless tobacco: Never Used   Substance and Sexual Activity    Alcohol use: Yes     Alcohol/week: 0.0 oz     Comment: social     Drug use: No    Sexual activity: Yes     Partners: Female   Lifestyle    Physical activity:     Days per week: Not on file     Minutes per session: Not on file    Stress: To some extent   Relationships    Social connections:     Talks on phone: Not on file     Gets together: Not on file     Attends Shinto service: Not on file     Active member of club or organization: Not on file     Attends meetings of clubs or organizations: Not on file     Relationship status: Not on file   Other Topics Concern    Not on file   Social History Narrative    Not on file       MEDICATIONS:   Current Outpatient Medications:     clindamycin (CLEOCIN T) 1 % external solution, Apply topically 2 (two) times daily., Disp: 1 Bottle, Rfl: 3    ibuprofen (ADVIL,MOTRIN) 200 MG tablet, Take 200 mg by mouth every 6 (six) hours as needed for Pain., Disp: , Rfl:   ALLERGIES: Review of patient's allergies indicates:  No Known Allergies    Objective:     VITAL SIGNS: BP  "126/81   Pulse 76   Ht 6' 2" (1.88 m)   Wt 111.1 kg (245 lb)   BMI 31.46 kg/m²    General    Nursing note and vitals reviewed.  Constitutional: He is oriented to person, place, and time. He appears well-developed and well-nourished.   HENT:   Head: Normocephalic and atraumatic.   no nasal discharge, no external ear redness or discharge   Eyes:   EOM is full and smooth  No eye redness or discharge   Neck: Neck supple. No tracheal deviation present.   Cardiovascular: Normal rate.    2+ Radial pulse bilaterally  2+ Dorsalis Pedis pulse bilaterally  No LE edema appreciated   Pulmonary/Chest: Effort normal. No respiratory distress.   Abdominal: He exhibits no distension.   No rigidity   Neurological: He is alert and oriented to person, place, and time. He exhibits normal muscle tone. Coordination normal.   See details below   Psychiatric: He has a normal mood and affect. His behavior is normal.               MUSCULOSKELETAL EXAM  ANKLE: right ANKLE  The affected ankle is compared to the contralateral ankle.    Observation:    + mild lateral ankle edema. There is no  Erythema or ecchymosis.   Normal callus pattern on the feet bilaterally.    Achilles tendon and calcaneal insertion reveals no deformities  No leg or intrinsic foot muscle atrophy.  Squatting reveals symmetric pronation of the bilateral feet.   Able to rise on toes with symmetric supination.    Non antalgic gait with increased hip external rotation but neutral medial arch and neutral ankle motion  + right sided ankle instability with single leg stance    ROM (* = with pain):  Active dorsiflexion to 20° on left and 15° on right*  Active plantarflexion to 50° on left and 50° on right    Active ankle inversion to 35° on left and 35° on right  Active ankle eversion to 15° on left and 15° on right  Full active flexion/extension of the toes bilaterally.   Heel cords without tightness bilaterally.    Tenderness To Palpation:  + tenderness at the ATFL proximal " attachment of the lateral malleoli  No tenderness at the CFL, PTFL, or deltoid ligaments  No tenderness over the distal anterior syndesmosis, distal tibia/fibula, fibular head/shaft  + tenderness at  lateral malleoli   No tenderness at medial malleoli  No tenderness at navicular, cuboid, cuneiforms, talus, or calcaneous  No tenderness along the metatarsals or phalanges  No tenderness at the Achilles tendon calcaneal insertion  No tenderness at the posterior tibial or peroneal tendons    Strength Testing (* = with pain):  Dorsiflexion - 5/5 on left and 5/5 on right  Platarflexion - 5/5 on left and 5/5 on right  Resisted Inversion - 5/5 on left and 5/5 on right  Resisted Eversion - 5/5 on left and 5/5 on right  Great Toe Extension - 5/5 on left and 5/5 on right  Great Toe Flexion - 5/5 on left and 5/5 on right    Special Tests:  Anterior talar drawer - negative and without dimpling  Talar tilt - positive for laxity  Reverse Talar tilt - negative    Heel tap test - negative  Distal tib/fib squeeze test - negative  External rotation stress (Kleiger) test - negative  Peralta squeeze test - negative    Metatarsal squeeze test - negative  Midfoot stress test - negative  Calcaneal squeeze test - negative    No subluxation of the peroneal tendons with resisted eversion.    Vascular/Sensory Exam:  DP and PT pulses intact bilaterally  No skin changes, no abnormal hair distribution  Sensation intact to light touch throughout the saphenous, sural, superficial peroneal, deep peroneal, and tibial nerve distributions  Negative Tinel's test over tarsal tunnel  2+/4 reflexes at L4 and S1 dermatomes  Capillary refill intact <2 seconds in all toes bilaterally.    TART (Tissue texture abnormality, Asymmetry,  Restriction of motion and/or Tenderness) changes:    Lower Extremity:   · Leg lengths symmetric    Location/joint Finding/restriction   Fibular head Neutral   Tibia Neutral   Talocrural joint Right   Subtalar Joint Right   Cuboid  Neutral   Talo-navicular joint Right   Navicular-cuneiform joint Right   1st, 2nd, 3rd, 4th, 5th Cuneiform-metatarsal joint Neutral   1st, 2nd, 3rd, 4th, 5th metatarsal Neutral   1st, 2nd, 3rd, 4th, 5th phalange Neutral   Lateral ankle continual distortion (CD) fascial distortion and Trigger band (TB) fascial distortion        Key   F= Flexed   E = Extended   R = Rotated   S = Sidebent   TTA = tissue texture abnormality     IMAGIN. X-ray ordered due to right ankle pain. (AP, lateral, and oblique views) taken today.   2. X-ray images were reviewed personally by me and then directly with patient.  3. FINDINGS: X-ray images obtained demonstrate no fracture or dislocation is seen.  The ankle mortise is intact.  Bone mineralization is appropriate.  Visualized joint spaces appear well maintained.  The surrounding soft tissues appear unremarkable.  Osteophyte formation noted at the medial and lateral malleoli as well as the talar dome.  4. IMPRESSION: Osteophyte formation noted at the medial and lateral malleoli as well as the talar dome consistent with mild degenerative change.       Assessment:      Encounter Diagnoses   Name Primary?    Chronic pain of right ankle Yes    Ankle instability, right     Post-traumatic osteoarthritis of right ankle     Somatic dysfunction of lower extremity           Plan:     1.  Chronic right ankle pain secondary to ankle instability and repetitive lateral ankle sprains resulting in mild degenerative change as noted on x-ray today.  - OMT performed today to address associated biomechanical and fascial restrictions  - Recommend use of right ankle brace with increased activity such as running and playing tennis  - Recommend Ice up to 20 minutes at a time and OTC NSAIDs prn for pain control  - Referral to outpatient PT ([facility] location) for increase ankle stability and proprioception  - Consider diagnostic US exam of right ankle if pain persists following PT  - X-ray images of  right ankle taken today (AP, lateral, and oblique views) showed no abnormalities. Images were personally reviewed with patient.    2. OMT 1-2 regions. Oral consent obtained.  Reviewed benefits and potential side effects, including bruising at site of treatment and increased soreness for the next 24-48 hours. Pt. Instructed to increased water intake by 1 L today and tomorrow to help with any residual soreness.   - OMT indicated today due to signs and symptoms as well as local and remote somatic dysfunction findings and their related neurokinetic, lymphatic, fascial and/or arteriovenous body connections.   - OMT techniques used: Fascial Distortion Model and Articulatory   - Treatment was tolerated well. Improvement noted in segmental mobility post-treatment in dysfunctional regions. There were no adverse events and no complications immediately following treatment.     3. Follow-up upon completion of PT if pain persist or deteriorates    4. Patient agreeable to today's plan and all questions were answered    This note is dictated using the M*Modal Fluency Direct word recognition program. There are word recognition mistakes that are occasionally missed on review.

## 2019-09-19 ENCOUNTER — CLINICAL SUPPORT (OUTPATIENT)
Dept: REHABILITATION | Facility: OTHER | Age: 34
End: 2019-09-19
Attending: FAMILY MEDICINE
Payer: COMMERCIAL

## 2019-09-19 DIAGNOSIS — M53.86 DECREASED ROM OF LUMBAR SPINE: ICD-10-CM

## 2019-09-19 DIAGNOSIS — R68.89 DECREASED FUNCTIONAL ACTIVITY TOLERANCE: ICD-10-CM

## 2019-09-19 DIAGNOSIS — R53.1 WEAKNESS: ICD-10-CM

## 2019-09-19 PROCEDURE — 97110 THERAPEUTIC EXERCISES: CPT

## 2019-09-19 PROCEDURE — 97161 PT EVAL LOW COMPLEX 20 MIN: CPT

## 2019-09-20 NOTE — PLAN OF CARE
[facility] - PHYSICAL THERAPY EVALUATION     Name: Lisa Garcia  Clinic Number: [id number]    Therapy Diagnosis:   Encounter Diagnoses   Name Primary?    Decreased ROM of lumbar spine     Decreased functional activity tolerance     Weakness         Physician: Phyllis Montana PA-C    Physician Orders: PT Eval and Treat     Medical Diagnosis from Referral:   M79.18 (ICD-10-CM) - Myofascial muscle pain  M54.14,M54.17 (ICD-10-CM) - Thoracic and lumbosacral neuritis  M54.41,G89.29 (ICD-10-CM) - Chronic bilateral low back pain with right-sided sciatica    Evaluation Date: 9/19/2019  Authorization Period Expiration: 12/31/19  Plan of Care Expiration: 12/19/19  Reassessment Due: 10/19/19  Visit # / Visits authorized: 1/ 20    Time In: 1435  Time Out: 1800  Total Billable Time: 85 minutes    Precautions: Standard    Pattern of pain determined:       Subjective       History of current condition - Lisa reports that he has had back issues for years. He has lower back pain with shooting pain to the R LE, and feels tingling and numbness to R foot. Patient was into working out, but would not do any heavy lifting. Patient reports having moments of when his back locks up with sudden movement. He denies any trauma, or MVA. Pain is provoked or worsened with fwd bending, extended sitting and standing positions, heavy lifting and twisting, laying on soft surface. He reports worse feeling in the AM due to stiffness. Patient played  Tennis on Monday, and felt really tight with movement. He bough an inversion table, and him and his fiance use it.        MD's Notes:  History of chronic back pain.    He has chronic constant LBP x years with intermittent right lateral leg pain to his calf. No left leg pain. LBP > right leg pain. Pain is tightness in nature. Worse with prolonged sitting/laying flat. Pain is worse when he gets up in the morning and better as he moves around. He rates his pain as a 6 on a scale of 1-10. He has  intermittent numbness and tingling in his right leg. No weakness. No specific alleviating factors, pain varies. Had severe flare up a few months ago- seen in ED and given injections and medications.      No PT, lumbar injections, or surgery on his back. He's tried some stretching and yoga but has not kept it up. He takes prn motrin with some improvement.     Medical History:   Past Medical History:   Diagnosis Date    Alopecia of scalp 2009    Medical history non-contributory        Surgical History:   Lisa Garcia  has a past surgical history that includes Appendectomy.    Medications:   Lisa has a current medication list which includes the following prescription(s): clindamycin and ibuprofen.    Allergies:   Review of patient's allergies indicates:  No Known Allergies     Imaging:  ADDENDUM 8/16/19:  X-rays of lumbar spine (AP/lat/flex/ext) dated 8/16/19 are personally reviewed and show moderate DDD L4-L5 and mild DDD L5-S1.   Minimal levoscoliosis centered at L3.    Prior Therapy: None  Prior Treatment: None  Occupation: [employer], .   Leisure: Gym, Tennis      Prior Level of Function: Unlimited with al activities   Current Level of Function: Mod limitation with daily activities     DME owned/used: NA        Pain:  Current 4/10, worst 9/10, best 2/10   Location: bilateral back  and buttocks (R>L)   Description: Aching, Throbbing, Tight, Sharp and Shooting  Aggravating Factors: See Subjective  Easing Factors: pain medication; rest lidocaine patches, laying down flat on hard surface, sleeping on L side.   Disturbed Sleep: YES    Pattern of pain questions:  1.  Where is your pain the worst? Across lower back R>L  2.  Is your pain constant or intermittent? Constant  3.  Does bending forward make your typical pain worse? Yes  4.  Since the start of your back pain, has there been a change in your bowel or bladder? NO  5.  What can't you do now that you use to be able to do? Tennis, gym/working out  "w/o pain, activities w/o pain      Pts goals: Tennis, gym/working out w/o pain, activities w/o pain      Red Flag Screening:   Cough  Sneeze  Strain: (+) Sometimes  Bladder/ bowel: (--)  Falls: (--)  Night pain: (+)  Unexplained weight loss: (--)  General health: "Good to Great".    OBJECTIVE     Postural examination/scapula alignment: Rounded shoulder and Normal alignement     Joint integrity: Abnormal lumbar spine per xray    Skin integrity: Normal    Edema: none    Correction of posture: no effect with lumbar roll    MOVEMENT LOSS    ROM Loss   Flexion ERP minimal loss   Extension within functional limits   Side bending Right ERP within functional limits   Side bending Left ERP within functional limits   Rotation Right ERP within functional limits   Rotation Left ERP within functional limits     Lower Extremity Strength B LE's MMT 5/5    GAIT:  Assistive Device used: none  Level of Assistance: independent  Patient displays the following gait deviations:  no gait deviations observed.     Special Tests:   Test Name  Test Result   Prone Instability Test (--)   SI Joint Provocation Test (+) B   Straight Leg Raise (+) R side    Neural Tension Test DNT   Crossed Straight Leg Raise DNT   Walking on toes (--)   Walking on heels  (--)       Positive Raya, scour grind, and Piriformis on the R side with Groin pain with testing; Negative on the L side     NEUROLOGICAL SCREENING     Sensory deficit: All WNL B LE    Reflexes:    Left Right   Patella Tendon 2+ 2+   Achilles Tendon 2+ 2+   Babinski  (--) (--)   Clonus (--) (--)     REPEATED TEST MOVEMENTS:  Repeated Flexion in Standing end range pain  no worse  no better   Repeated Extension in Standing end range pain  pain during motion  worse   Repeated Flexion in lying end range pain  no worse  no better   Repeated Extension in lying  no worse  no better       STATIC TESTS   Lying prone in extension  no worse  better but worse after a while.        Baseline Isometric " Testing on Med X equipment: Testing administered by PT  Date of testin19  ROM 0-42 deg   Max Peak Torque 246    Min Peak Torque 69    Flex/Ext Ratio 3.56   % below normative data 30     Starting weight 70-75#      CMS Impairment/Limitation/Restriction for FOTO Lumbar Spine Survey  Status Limitation G-Code CMS Severity Modifier  Intake 49% 51% Current Status CK - At least 40 percent but less than 60 percent  Predicted 66% 34% Goal Status+ CJ - At least 20 percent but less than 40 percent  D/C Status CK **only report if this is a one time visit        Treatment     Total Treatment time separate from Evaluation: 35 minutes      Lisa received therapeutic exercises to develop/improve posture, lumbar/cervical ROM, strength and muscular endurance for 35 minutes including the following exercises:       Med x dynamic exercise and baseline IM test  [facility] 2019   Visit Number 1   VAS Pain Rating 2   Lumbar Extension Seat Pad 0   Femur Restraint 6   Top Dead Center 24   Counterweight 397   Lumbar Flexion 42   Lumbar Extension 0   Lumbar Peak Torque 246   Min Torque 69   Test Percent Below Normative Data 30   Lumbar Weight 60   Ice - Z Lie (in min.) 10         Education provided:   - Patient received education regarding proper posture and body mechanics.  Patient was given top 10 tips handout which discusses posture seated, standing, lifting correctly, components of exercise, importance of nutrition and hydration, and importance of sleep.  - Philippe roll tried, recommended, and purchase information was provided.    - Patient received a handout regarding anticipated muscular soreness following the isometric test and strategies for management were reviewed with patient including stretching, using ice and scheduled rest.   - Patient received education on the [facility] pro gram, purpose of the isometric test, progression of back strengthening as well as wellness approach and systemic strengthening.   Details of the program were discussed.  Reviewed that patient should feel support/pressure from med ex restraints but no pain or discomfort and patient expressed understanding.        Assessment   Lisa is a 34 y.o. male referred to [facility] with multiple medical diagnoses related to LBP, with sciatic symptoms on the R LE. Xray studies revealed  moderate DDD L4-L5 and mild DDD L5-S1, with minimal levoscoliosis centered at L3. Gross Lumbar spine AROM WFL with all planes with ERP; B LE's MMT strength all WNL along with sensation and reflexes. Special tests confirmed symptoms of DDD with radicular sensation to R LE. Repeated movement testing approximate to a 1 PEN pattern at this time. Given patient lumbar spine condition, Lumbar extensors IM strength was assessed with Medx today. Results revealed some weakness, with strength at 30% below normative data. Patient is appropriate for this program to increase lumbopelvic stability with back/core strengthening in order to stabilize lumbar spine, in the presence moderate DDD.       Pain Pattern: 1 PEN       Pt prognosis is Good.   Pt will benefit from skilled outpatient Physical Therapy to address the deficits stated above and in the chart below, provide pt/family education, and to maximize pt's level of independence. Based on the above history and physical examination an active physical therapy program is recommended.  Pt will continue to benefit from skilled outpatient physical therapy to address the deficits listed below in the chart, provide pt/family education and to maximize pt's level of independence in the home and community environment. .       Plan of care discussed with patient: Yes  Pt's spiritual, cultural and educational needs considered and patient is agreeable to the plan of care and goals as stated below:     Anticipated Barriers for therapy: none    PT Evaluation Completed? Yes    Medical necessity is demonstrated by the following problem list.     Pt presents with the following impairments:     History  Co-morbidities and personal factors that may impact the plan of care Co-morbidities:   non-significant to affect rehab    Personal Factors:   no deficits     low   Examination  Body Structures and Functions, activity limitations and participation restrictions that may impact the plan of care Body Regions:   back  lower extremities  trunk    Body Systems:    ROM  strength  gait  transfers  transitions    Participation Restrictions:   none    Activity limitations:   Learning and applying knowledge  no deficits    General Tasks and Commands  no deficits    Communication  no deficits    Mobility  lifting and carrying objects  walking  driving (bike, car, motorcycle)    Self care  no deficits    Domestic Life  shopping  cooking  doing house work (cleaning house, washing dishes, laundry)  assisting others    Interactions/Relationships  no deficits    Life Areas  no deficits    Community and Social Life  recreation and leisure         low   Clinical Presentation stable and uncomplicated low   Decision Making/ Complexity Score: low       GOALS: Pt is in agreement with the following goals.    Short term goals:  6 weeks or 10 visits   1.  Pt will demonstrate increased lumbar ROM by at least 3 degrees from the initial ROM value with improvements noted in functional ROM and ability to perform ADLs.  2.  Pt will demonstrate increased maximum isometric torque value by 10% when compared to the initial value resulting in improved ability to perform bending, lifting, and carrying activities safely, confidently.    3.  Patient report a reduction in worst pain score by 1-2 points for improved tolerance for functional activities.  4.  Pt able to perform HEP correctly with minimal cueing or supervision from therapist to encourage independent management of symptoms.       Long term goals: 13 weeks or 20 visits   1. Pt will demonstrate increased lumbar ROM by at least 6 degrees  "from initial ROM value, resulting in improved ability to perform functional fwd bending while standing and sitting.   2. Pt will demonstrate increased maximum isometric torque value by 20% when compared to the initial value resulting in improved ability to perform bending, lifting, and carrying activities safely, confidently.  3. Pt to demonstrate ability to independently control and reduce their pain through posture positioning and mechanical movements throughout a typical day.  4.  Pt will demonstrate reduced pain and improved functional outcomes as reported on the FOTO by reaching a score of CJ = at least 20% but < 40% impaired, limited or restricted or less in order to demonstrate subjective improvement in pt's condition.    5. Pt will demonstrate independence with the HEP at discharge        Plan   Outpatient physical therapy 2x week for 10 weeks or 20 visits to include the following:   - Patient education  - Therapeutic exercise  - Manual therapy  - Performance testing   - Neuromuscular Re-education  - Therapeutic activity   - Modalities    Pt may be seen by PTA as part of the rehabilitation team.     Therapist: Audie Hernandez, PT  9/19/2019    "I certify the need for these services furnished under this plan of treatment and while under my care."    ____________________________________  Physician/Referring Practitioner    _______________  Date of Signature          "

## 2019-09-30 ENCOUNTER — CLINICAL SUPPORT (OUTPATIENT)
Dept: REHABILITATION | Facility: OTHER | Age: 34
End: 2019-09-30
Attending: FAMILY MEDICINE
Payer: COMMERCIAL

## 2019-09-30 DIAGNOSIS — R53.1 WEAKNESS: ICD-10-CM

## 2019-09-30 DIAGNOSIS — M53.86 DECREASED ROM OF LUMBAR SPINE: ICD-10-CM

## 2019-09-30 DIAGNOSIS — R68.89 DECREASED FUNCTIONAL ACTIVITY TOLERANCE: ICD-10-CM

## 2019-09-30 PROCEDURE — 97110 THERAPEUTIC EXERCISES: CPT

## 2019-09-30 NOTE — PROGRESS NOTES
[facility]      Name: Lisa Garcia  Clinic Number: [id number]    Therapy Diagnosis:   Encounter Diagnoses   Name Primary?    Decreased ROM of lumbar spine     Decreased functional activity tolerance     Weakness      Physician: Phyllis Montana PA-C    Visit Date: 2019    Physician Orders: PT Eval and Treat      Medical Diagnosis from Referral:   M79.18 (ICD-10-CM) - Myofascial muscle pain  M54.14,M54.17 (ICD-10-CM) - Thoracic and lumbosacral neuritis  M54.41,G89.29 (ICD-10-CM) - Chronic bilateral low back pain with right-sided sciatica     Evaluation Date: 2019  Authorization Period Expiration: 19  Plan of Care Expiration: 19  Reassessment Due: 10/19/19  Visit # / Visits authorized:      Time In: 1435  Time Out: 1800  Total Billable Time: 85 minutes     Precautions: Standard     Pattern of pain determined:       Subjective   Lisa reports experiencing slight increase in B hip tightness since previous session.     Patient reports tolerating previous visit well with minimal soreness   Patient reports their pain to be 5/10 on a 0-10 scale with 0 being no pain and 10 being the worst pain imaginable.  Pain Location: B hip, low back     Occupation: [employer], .   Leisure: Gym, Tennis       Objective     Baseline Isometric Testing on Med X equipment: Testing administered by PT  Date of testin19  ROM 0-42 deg   Max Peak Torque 246    Min Peak Torque 69    Flex/Ext Ratio 3.56   % below normative data 30      Starting weight 70-75#        CMS Impairment/Limitation/Restriction for FOTO Lumbar Spine Survey  Status Limitation G-Code CMS Severity Modifier  Intake 49% 51% Current Status CK - At least 40 percent but less than 60 percent  Predicted 66% 34% Goal Status+ CJ - At least 20 percent but less than 40 percent  D/C Status CK **only report if this is a one time visit      Treatment    Pt was instructed in and performed the following:      Lisa received therapeutic exercises to develop/improved posture, cardiovascular endurance, muscular endurance, lumbar/cervical ROM, strength and muscular endurance for 50 minutes including the following exercises:     [facility] 9/30/2019   Visit Number 2   VAS Pain Rating 5   Lumbar Extension Seat Pad -   Femur Restraint -   Top Dead Center -   Counterweight -   Lumbar Flexion -   Lumbar Extension -   Lumbar Peak Torque -   Min Torque -   Test Percent Below Normative Data -   Lumbar Weight 75   Repetitions 15   Rating of Perceived Exertion 3   Ice - Z Lie (in min.) 10     DKTC 10x  Piriformis stretch 2x20 sec  PPT 10x  Prone rectus femoris stretch 2x20 sec        Peripheral muscle strengthening which included 1 set of 15-20 repetitions at a slow, controlled 10-13 second per rep pace focused on strengthening supporting musculature for improved body mechanics and functional mobility.  Pt and therapist focused on proper form during treatment to ensure optimal strengthening of each targeted muscle group.  Machines were utilized including torso rotation, leg extension, leg curl, chest press, upright row. Tricep extension, bicep curl, leg press, and hip abduction added visit 3    Lisa received the following manual therapy techniques: NA       Home Exercises Provided and Patient Education Provided     Education provided:   - HEP importance  - RPE     Written Home Exercises Provided: Patient instructed to cont prior HEP.  Exercises were reviewed and Lisa was able to demonstrate them prior to the end of the session.  Lisa demonstrated good  understanding of the education provided.     See EMR under Patient Instructions for exercises provided prior visit.          Assessment     Pt able to complete all components of session with mild increase in B hip pain following therex. Pt medx resistance initiated at 75 ft#. Able to complete 15 repetitions with appropriate RPE. PPT completed easily today. Consider  progressing stabilization exercises next session.   Patient is making good progress towards established goals.  Pt will continue to benefit from skilled outpatient physical therapy to address the deficits stated in the impairment chart, provide pt/family education and to maximize pt's level of independence in the home and community environment.     Anticipated Barriers for therapy: None  Pt's spiritual, cultural and educational needs considered and pt agreeable to plan of care and goals as stated below:       GOALS: Pt is in agreement with the following goals.     Short term goals:  6 weeks or 10 visits   1.  Pt will demonstrate increased lumbar ROM by at least 3 degrees from the initial ROM value with improvements noted in functional ROM and ability to perform ADLs.  2.  Pt will demonstrate increased maximum isometric torque value by 10% when compared to the initial value resulting in improved ability to perform bending, lifting, and carrying activities safely, confidently.     3.  Patient report a reduction in worst pain score by 1-2 points for improved tolerance for functional activities.  4.  Pt able to perform HEP correctly with minimal cueing or supervision from therapist to encourage independent management of symptoms.         Long term goals: 13 weeks or 20 visits   1. Pt will demonstrate increased lumbar ROM by at least 6 degrees from initial ROM value, resulting in improved ability to perform functional fwd bending while standing and sitting.   2. Pt will demonstrate increased maximum isometric torque value by 20% when compared to the initial value resulting in improved ability to perform bending, lifting, and carrying activities safely, confidently.  3. Pt to demonstrate ability to independently control and reduce their pain through posture positioning and mechanical movements throughout a typical day.  4.  Pt will demonstrate reduced pain and improved functional outcomes as reported on the FOTO by  reaching a score of CJ = at least 20% but < 40% impaired, limited or restricted or less in order to demonstrate subjective improvement in pt's condition.    5. Pt will demonstrate independence with the HEP at discharge              Plan   Continue with established Plan of Care towards established PT goals.

## 2019-10-02 ENCOUNTER — CLINICAL SUPPORT (OUTPATIENT)
Dept: REHABILITATION | Facility: OTHER | Age: 34
End: 2019-10-02
Attending: FAMILY MEDICINE
Payer: COMMERCIAL

## 2019-10-02 DIAGNOSIS — R53.1 WEAKNESS: ICD-10-CM

## 2019-10-02 DIAGNOSIS — R68.89 DECREASED FUNCTIONAL ACTIVITY TOLERANCE: ICD-10-CM

## 2019-10-02 DIAGNOSIS — M53.86 DECREASED ROM OF LUMBAR SPINE: ICD-10-CM

## 2019-10-02 PROCEDURE — 97110 THERAPEUTIC EXERCISES: CPT

## 2019-10-02 NOTE — PROGRESS NOTES
[facility] Physical Therapy Treatment      Name: Lisa Garcia  Clinic Number: [id number]    Therapy Diagnosis:   Encounter Diagnoses   Name Primary?    Decreased ROM of lumbar spine     Decreased functional activity tolerance     Weakness      Physician: Phyllis Montana PA-C    Visit Date: 10/2/2019    Physician Orders: PT Eval and Treat      Medical Diagnosis from Referral:   M79.18 (ICD-10-CM) - Myofascial muscle pain  M54.14,M54.17 (ICD-10-CM) - Thoracic and lumbosacral neuritis  M54.41,G89.29 (ICD-10-CM) - Chronic bilateral low back pain with right-sided sciatica     Evaluation Date: 2019  Authorization Period Expiration: 19  Plan of Care Expiration: 19  Reassessment Due: 10/19/19  Visit # / Visits authorized: 3/ 20     Time In: 400  Time Out: 500  Total Billable Time: 45 minutes     Precautions: Standard     Pattern of pain determined:       Subjective   Lisa reports he was sitting for a large portion of the day. As a result he is experiencing increased  Hip pain     Patient reports tolerating previous visit well with minimal soreness   Patient reports their pain to be 6/10 on a 0-10 scale with 0 being no pain and 10 being the worst pain imaginable.  Pain Location: B hip, low back     Occupation: [employer], .   Leisure: Gym, Tennis       Objective     Baseline Isometric Testing on Med X equipment: Testing administered by PT  Date of testin19  ROM 0-42 deg   Max Peak Torque 246    Min Peak Torque 69    Flex/Ext Ratio 3.56   % below normative data 30      Starting weight 70-75#        CMS Impairment/Limitation/Restriction for FOTO Lumbar Spine Survey  Status Limitation G-Code CMS Severity Modifier  Intake 49% 51% Current Status CK - At least 40 percent but less than 60 percent  Predicted 66% 34% Goal Status+ CJ - At least 20 percent but less than 40 percent  D/C Status CK **only report if this is a one time visit      Treatment    Pt was instructed in and  performed the following:     Lisa received therapeutic exercises to develop/improved posture, cardiovascular endurance, muscular endurance, lumbar/cervical ROM, strength and muscular endurance for 50 minutes including the following exercises:   [facility] 10/2/2019   Visit Number 3   VAS Pain Rating 6   Treadmill Time (in min.) 10   Lumbar Extension Seat Pad -   Femur Restraint -   Top Dead Center -   Counterweight -   Lumbar Flexion -   Lumbar Extension -   Lumbar Peak Torque -   Min Torque -   Test Percent Below Normative Data -   Lumbar Weight 75   Repetitions 18   Rating of Perceived Exertion 4   Ice - Z Lie (in min.) 10     DKTC 10x  Piriformis stretch 2x20 sec  PPT 10x  Prone rectus femoris stretch 2x20 sec (NT)  Bridges 10x  Clams GTB 10x  Prone hip ext   MET for correction of R ant innominate     Peripheral muscle strengthening which included 1 set of 15-20 repetitions at a slow, controlled 10-13 second per rep pace focused on strengthening supporting musculature for improved body mechanics and functional mobility.  Pt and therapist focused on proper form during treatment to ensure optimal strengthening of each targeted muscle group.  Machines were utilized including torso rotation, leg extension, leg curl, chest press, upright row. Tricep extension, bicep curl, leg press, and hip abduction added visit 3    Lisa received the following manual therapy techniques: NA       Home Exercises Provided and Patient Education Provided     Education provided:   - HEP importance  - RPE     Written Home Exercises Provided: Patient instructed to cont prior HEP.  Exercises were reviewed and Lisa was able to demonstrate them prior to the end of the session.  Lisa demonstrated good  understanding of the education provided.     See EMR under Patient Instructions for exercises provided prior visit.      Assessment     Pt able to complete all components of session with mild increase in B hip pain following therex.  Pt medx resistance initiated at 75 ft#. Able to complete 18 repetitions with appropriate RPE. Clams, bridges, and prone hip extension implemented to progress stabilization interventions. Continue to progress as tolerated. R ant MET implemented to correct SI asymmetry   Patient is making good progress towards established goals.  Pt will continue to benefit from skilled outpatient physical therapy to address the deficits stated in the impairment chart, provide pt/family education and to maximize pt's level of independence in the home and community environment.     Anticipated Barriers for therapy: None  Pt's spiritual, cultural and educational needs considered and pt agreeable to plan of care and goals as stated below:       GOALS: Pt is in agreement with the following goals.     Short term goals:  6 weeks or 10 visits   1.  Pt will demonstrate increased lumbar ROM by at least 3 degrees from the initial ROM value with improvements noted in functional ROM and ability to perform ADLs.  2.  Pt will demonstrate increased maximum isometric torque value by 10% when compared to the initial value resulting in improved ability to perform bending, lifting, and carrying activities safely, confidently.     3.  Patient report a reduction in worst pain score by 1-2 points for improved tolerance for functional activities.  4.  Pt able to perform HEP correctly with minimal cueing or supervision from therapist to encourage independent management of symptoms.         Long term goals: 13 weeks or 20 visits   1. Pt will demonstrate increased lumbar ROM by at least 6 degrees from initial ROM value, resulting in improved ability to perform functional fwd bending while standing and sitting.   2. Pt will demonstrate increased maximum isometric torque value by 20% when compared to the initial value resulting in improved ability to perform bending, lifting, and carrying activities safely, confidently.  3. Pt to demonstrate ability to  independently control and reduce their pain through posture positioning and mechanical movements throughout a typical day.  4.  Pt will demonstrate reduced pain and improved functional outcomes as reported on the FOTO by reaching a score of CJ = at least 20% but < 40% impaired, limited or restricted or less in order to demonstrate subjective improvement in pt's condition.    5. Pt will demonstrate independence with the HEP at discharge              Plan   Continue with established Plan of Care towards established PT goals.

## 2019-10-03 ENCOUNTER — CLINICAL SUPPORT (OUTPATIENT)
Dept: REHABILITATION | Facility: OTHER | Age: 34
End: 2019-10-03
Payer: COMMERCIAL

## 2019-10-03 DIAGNOSIS — M25.60 DECREASED RANGE OF MOTION WITH DECREASED STRENGTH: ICD-10-CM

## 2019-10-03 DIAGNOSIS — G89.29 CHRONIC PAIN OF RIGHT ANKLE: ICD-10-CM

## 2019-10-03 DIAGNOSIS — M25.60 DECREASED MOBILITY OF JOINT: ICD-10-CM

## 2019-10-03 DIAGNOSIS — M25.571 CHRONIC PAIN OF RIGHT ANKLE: ICD-10-CM

## 2019-10-03 DIAGNOSIS — R53.1 DECREASED RANGE OF MOTION WITH DECREASED STRENGTH: ICD-10-CM

## 2019-10-03 PROCEDURE — 97110 THERAPEUTIC EXERCISES: CPT | Mod: PN

## 2019-10-03 PROCEDURE — 97161 PT EVAL LOW COMPLEX 20 MIN: CPT | Mod: PN

## 2019-10-07 NOTE — PLAN OF CARE
"[facility]  Physical Therapy Initial Evaluation    Name: Lisa Garcia  Clinic Number: [id number]    Therapy Diagnosis:   Encounter Diagnoses   Name Primary?    Chronic pain of right ankle     Decreased range of motion with decreased strength     Decreased mobility of joint      Physician: Randi Thomas DO    Physician Orders: PT Eval and Treat -- Chronic ankle instability with recurrent ankle sprains. Pelvic instability and increased hip external rotation noted as well. NM retraining and gait training needed  Medical Diagnosis from Referral:   M25.571,G89.29 (ICD-10-CM) - Chronic pain of right ankle   M25.371 (ICD-10-CM) - Ankle instability, right       Evaluation Date: 10/3/2019  Authorization Period Expiration: 09/05/2020  Plan of Care Expiration: 01/01/2020  Visit # / Visits authorized: 1/ 1    Time In: 5:00  Time Out: 6:00  Total Billable Time: 60 minutes    Precautions: Standard and low back pain    Subjective   Date of onset: chronic >10 years, acute flare up 1 year ago  History of current condition - Lisa reports: Hx of chronic ankle instablity since his teens from basketball due to repeated right ankle sprains. He notes one of these injuries caused a subluxation. Episodes of right lateral ankle and calf pain and R ankle instability "as if it was going to pop out of place" have been intermittent over the past few years. Pt notes worsening that started about 1 year ago, and feels like it will sprain while just walking. He is very active with running, tennis and must use an ankle brace or doubled layer of socks for improved stability. Also reports inflexibility in the ankle, particularly with forced plantar flexion as noted with yoga with fiance.     R hand dominant.  Currently being treated at [facility] for low back and R hip pain.    Medical History:   Past Medical History:   Diagnosis Date    Alopecia of scalp 2009    Medical history non-contributory " "       Surgical History:   Lisa Garcia  has a past surgical history that includes Appendectomy.    Medications:   Lisa has a current medication list which includes the following prescription(s): clindamycin and ibuprofen.    Allergies:   Review of patient's allergies indicates:  No Known Allergies     Imaging, bone scan films, ankle, 2019: No fracture or dislocation is seen.  The ankle mortise is intact.  Bone mineralization is appropriate.  Visualized joint spaces appear well maintained.  The surrounding soft tissues appear unremarkable.    Prior Therapy: none for c/c. Current - yes, OPPT at [facility]  Social History: [facility], [facility]  Occupation:  at [employer]  Prior Level of Function: independent  Current Level of Function: intermittent episodes of pain, swelling    Pain:  Current 0/10, worst 4/10, best 0/10   Location: right ankle   Description: weak, stiff, unstable  Aggravating Factors: Walking, Getting out of bed/chair and running, stair climbing, lateral movements  Easing Factors: brace, rest, ice, medication prn, acupuncture "A long time ago"    Pts goals: reduce pain and instability to be able to be more active, exercise for continued weight loss    Objective     Observation: well built  Palpation: swelling noted on lateral R ankle with TTP over ATFL and CCFL    Ankle  Right   Left  Pain/Dysfunction with Movement    AROM PROM MMT AROM PROM MMT    Plantarflexion WNL --- 4+ WNL --- 5    Dorsiflexion  Knee ext 1 6 5 2 10 5    Dorsiflexion  Knee Flex 15 17 NT 2 12 NT    Inversion 35 45* 4+ 36 45 5 *indicates pain and guarding prior to endrange with noted fear of     Eversion 15 30 4 13 17 5      Great Toe: extension = WNL    Flexibility:    Right Left  Hamstring (SLR)  Fair- Fair-  Gastroc   poor poor  Soleus    Fair- Fair-  Flexor hallicus longus  good good    Joint Mobility: hypo STJ/TCJ (R>L), hypo R midfoot with poor pronation    Special Tests:  Anterior Draw Sign: -  Murray " Sign: -  Tinels at anterior branch of deep peroneal: -  Tinels at posterior tibial nerve: -  Peraltas: -    Gait: I  Transfers: I  Balance: SLS increased sway with LOB EC > EO (R)        CMS Impairment/Limitation/Restriction for FOTO Ankle Survey    Therapist reviewed FOTO scores for Lisa Garcia on 10/3/2019.   FOTO documents entered into [employer] - see Media section.    Limitation Score: 27%  Category: Mobility    Current : CJ = at least 20% but < 40% impaired, limited or restricted  Goal: CI = at least 1% but < 20% impaired, limited or restricted         TREATMENT   Treatment Time In: 5:35  Treatment Time Out: 6:00  Total Treatment time separate from Evaluation: 25 minutes    Lisa received therapeutic exercises to develop strength, endurance, ROM, flexibility, posture and core stabilization for 20 minutes including:  Incline gastroc stretch 2 x 1'  Incline soleus stretch 2 x 1'  SL heel raise 25x  resited 4-way ankle 20 x GTB    Home Exercises and Patient Education Provided    Education provided:   - Patient educated regarding pathogenesis, diagnosis, protocol, prognosis, POC, and HEP. Written Home Exercises Provided with written and verbal instructions for frequency and duration of the following exercises: gastroc/soleus stretching. Pt educated on HEP and activity modifications to reduce c/o pain and improve overall function. Educated on performance of HEP frequently throughout the day to reduce symptoms and improve overall mobility  - Pt was educated in posture and body mechanics.   - Pt also educated on use of modalities prn to reduce c/o pain and dysfunction.       Written Home Exercises Provided: yes.  Exercises were reviewed and Lisa was able to demonstrate them prior to the end of the session.  Lisa demonstrated good  understanding of the education provided.     See EMR under Patient Instructions for exercises provided 10/3/2019.    Assessment   Lisa is a 34 y.o. male referred to outpatient Physical  Therapy with a medical diagnosis of chronic pain and instability of the R ankle. Pt presents with marked limitations in strength, balance, motor control/coordination, ROM and flexibility. Limitations in flexibility, motor control, and strength continue to facilitate c/o pain and instability, with s/s associated to referring diagnosis of chronic ankle sprains. Impairments limit pt with all functional activities.    Pt prognosis is Good.   Pt will benefit from skilled outpatient Physical Therapy to address the deficits stated above and in the chart below, provide pt/family education, and to maximize pt's level of independence.     Plan of care discussed with patient: Yes  Pt's spiritual, cultural and educational needs considered and patient is agreeable to the plan of care and goals as stated below:     Anticipated Barriers for therapy: standard, occupation (desk job), pt also attending [facility] for back pain simultaneous with current PT for ankle - which will limit pt's availability for appointments as well as strain pt financially.    Medical Necessity is demonstrated by the following  History  Co-morbidities and personal factors that may impact the plan of care Co-morbidities:   coping style/mechanism, difficulty sleeping, excessive commute time/distance, financial considerations and Hx acute low back pain (currently going to [facility] for this), chronic ankle sprains    Personal Factors:   coping style  social background  lifestyle     moderate   Examination  Body Structures and Functions, activity limitations and participation restrictions that may impact the plan of care Body Regions:   lower extremities    Body Systems:    gross symmetry  ROM  strength  gross coordinated movement  balance  gait  transfers  transitions  motor control  motor learning  joint mobility, flexibility    Participation Restrictions:   ADLs, HHCs, work and recreational activities    Activity limitations:   Learning and  applying knowledge  no deficits    General Tasks and Commands  no deficits    Communication  no deficits    Mobility  lifting and carrying objects  walking  driving (bike, car, motorcycle)    Self care  looking after one's health    Domestic Life  shopping  cooking  doing house work (cleaning house, washing dishes, laundry)    Interactions/Relationships  no deficits    Life Areas  no deficits    Community and Social Life  community life  recreation and leisure         moderate   Clinical Presentation stable and uncomplicated low   Decision Making/ Complexity Score: low     Goals:  Short Term Goals (5 Weeks):  1. Pt able to walk 1 hour with household chores with <3/10 pain.  2.  Pt able to improve flexibility by a half grade to allow for increased ease with squatting and lifting activities.  3. Pt able to improve DF ROM by 5% to allow pt to ascend/descend 1 flight of stairs, independently, 1 handrail, with increased ease.  4. Pt to improve strength by a half grade to allow for increased ease with running forwards and laterally.  5. Pt to demonstrate improved functional ability with FOTO limitation <=22% disability.    Long Term Goals (10 Weeks):  1. Pt able to walk 1 hour with household chores with <2/10 pain.  2.  Pt able to improve flexibility by a full grade to allow for increased ease with squatting and lifting activities.  3. Pt able to improve DF ROM by >=10% to allow pt to ascend/descend 1 flight of stairs, independently, 1 handrail, with increased ease.  4. Pt to improve strength to 5/5 to allow for increased ease with running forwards and laterally.  5. Pt to demonstrate improved functional ability with FOTO limitation <=17% disability.  6. Pt will be independent with HEP and self management of symptoms.       Plan   Plan of care Certification: 10/3/2019 to 01/01/2020.    Outpatient Physical Therapy 3 times weekly for 10 weeks to include the following interventions: Aquatic Therapy, cervical/lumbar  mechanical traction, Electrical Stimulation prn, Gait Training, Iontophoresis (with dexamethasone prn), Manual Therapy, Moist Heat/ Ice, Neuromuscular Re-ed, Patient Education, Self Care, Therapeutic Activites, Therapeutic Exercise and IASTYM, therapeutic taping, dry needling. Progress HEP towards D/C. Recommend F/U with MD if symptoms worsen or do not resolve. Patient may be seen by a PTA for treatment to carry out their plan of care.  Face-to-face conferences will be held.       Phuong Crawford, PT

## 2019-10-21 ENCOUNTER — CLINICAL SUPPORT (OUTPATIENT)
Dept: REHABILITATION | Facility: OTHER | Age: 34
End: 2019-10-21
Payer: COMMERCIAL

## 2019-10-21 DIAGNOSIS — M25.60 DECREASED RANGE OF MOTION WITH DECREASED STRENGTH: ICD-10-CM

## 2019-10-21 DIAGNOSIS — G89.29 CHRONIC PAIN OF RIGHT ANKLE: ICD-10-CM

## 2019-10-21 DIAGNOSIS — M25.571 CHRONIC PAIN OF RIGHT ANKLE: ICD-10-CM

## 2019-10-21 DIAGNOSIS — M25.60 DECREASED MOBILITY OF JOINT: ICD-10-CM

## 2019-10-21 DIAGNOSIS — R53.1 DECREASED RANGE OF MOTION WITH DECREASED STRENGTH: ICD-10-CM

## 2019-10-21 PROCEDURE — 97110 THERAPEUTIC EXERCISES: CPT | Mod: PN | Performed by: INTERNAL MEDICINE

## 2019-10-21 PROCEDURE — 97112 NEUROMUSCULAR REEDUCATION: CPT | Mod: PN | Performed by: INTERNAL MEDICINE

## 2019-10-21 NOTE — PROGRESS NOTES
[facility]  Physical Therapy  Rx note     Name: Lisa Garcia  [facility] Number: [id number]     Therapy Diagnosis:        Encounter Diagnoses   Name Primary?    Chronic pain of right ankle      Decreased range of motion with decreased strength      Decreased mobility of joint        Physician: Randi Thomas DO     Physician Orders: PT Eval and Treat -- Chronic ankle instability with recurrent ankle sprains. Pelvic instability and increased hip external rotation noted as well. NM retraining and gait training needed  Medical Diagnosis from Referral:   M25.571,G89.29 (ICD-10-CM) - Chronic pain of right ankle   M25.371 (ICD-10-CM) - Ankle instability, right         Evaluation Date: 10/3/2019  Authorization Period Expiration: 09/05/2020  Plan of Care Expiration: 01/01/2020  Visit # / Visits authorized: 2/2     Time In: 5:05  Time Out: 5: 55  Total Billable Time: 50 minutes     Precautions: Standard and low back pain     Subjective       No R ankle c/o. No L ankle c/o in past. Sprained first time in middle school, multiple sprains on R since then. H/o using ankle brace. Estim with dry needles and strengthening in past.  10 yrs since last PT . Becoming more unstable with just walking.           Imaging, bone scan films, ankle, 2019: No fracture or dislocation is seen.  The ankle mortise is intact.  Bone mineralization is appropriate.  Visualized joint spaces appear well maintained.  The surrounding soft tissues appear unremarkable.        Pain:  Current 0/10, worst 4/10, best 0/10   Location: right ankle usually lat mall ant       Pts goals: reduce pain and instability to be able to be more active, exercise for continued weight loss     Objective         10/3 Palpation:   TTP over ATFL and CCFL     Ankle 10/3    Right     Left   Pain/Dysfunction with Movement     AROM PROM MMT AROM PROM MMT     Plantarflexion WNL --- 4+ WNL --- 5     Dorsiflexion  Knee ext 1 6 5 2 10 5      Dorsiflexion  Knee Flex 15 17 NT 2 12 NT     Inversion 35 45* 4+ 36 45 5 *indicates pain and guarding prior to endrange with noted fear of     Eversion 15 30 4 13 17 5              10/3 Flexibility:                              Right   Left  Hamstring (SLR)                     Fair-     Fair-  Gastroc                                   poor     poor  Soleus                                     Fair-     Fair-  Flexor hallicus longus             good    good     10/3 Joint Mobility: hypo STJ/TCJ (R>L), hypo R midfoot with poor pronation     10/ 3 Special Tests:  Anterior Draw Sign: -  Jacquelines Sign: -  Tinels at anterior branch of deep peroneal: -  Tinels at posterior tibial nerve: -  Peraltas: -     Gait: I     10/3 Balance: SLS increased sway with LOB EC > EO (R)           CMS Impairment/Limitation/Restriction for FOTO Ankle Survey     Therapist reviewed FOTO scores for Lisa Garcia on 10/3/2019.   FOTO documents entered into [employer] - see Media section.     Limitation Score: 27%  Category: Mobility     Current : CJ = at least 20% but < 40% impaired, limited or restricted  Goal: CI = at least 1% but < 20% impaired, limited or restricted            TREATMENT         Lisa received therapeutic exercises to develop strength, endurance, ROM, flexibility, posture and core stabilization for 35 minutes including:  Incline gastroc stretch 2 x 1'  Incline soleus stretch 2 x 1'  SL heel raise  3x  10x  resisted 4-way ankle 20 x GTB  towel crunch 3 min   Marbles 3 min    Hamstring stretch plinth 90 sec each with vc to sit erect ( no BP)    Neuromuscular re-ed for balance, coordination, proprioception x 10 min including:    SLS 15 sec x 3 foam  B   baps seated cw/ ccw 15x   March foam 2 min   March 3 hold foam 2 min      Home Exercises and Patient Education Provided     Education provided:   Ex without increased back or ankle pain         Written Home Exercises Provided: SLS, marching, towel crunches  Exercises were reviewed  and Lisa was able to demonstrate them prior to the end of the session.  Lisa demonstrated good  understanding of the education provided.      See EMR under Patient Instructions for exercises provided 10/3/2019  Pt instructions 10/21.     Assessment   Decreased stability R ankle vs L but no c/o pain during ex.      Pt prognosis is Good.   Pt will benefit from skilled outpatient Physical Therapy to address the deficits stated above and in the chart below, provide pt/family education, and to maximize pt's level of independence.            Anticipated Barriers for therapy: standard, occupation (desk job), pt also attending [facility] for back pain simultaneous with current PT for ankle - which will limit pt's availability for appointments as well as strain pt financially.           Goals:  Short Term Goals (5 Weeks):  1. Pt able to walk 1 hour with household chores with <3/10 pain. Progressing, not met  2.  Pt able to improve flexibility by a half grade to allow for increased ease with squatting and lifting activities.  Progressing, not met  3. Pt able to improve DF ROM by 5% to allow pt to ascend/descend 1 flight of stairs, independently, 1 handrail, with increased ease.  Progressing, not met  4. Pt to improve strength by a half grade to allow for increased ease with running forwards and laterally.  Progressing, not met  5. Pt to demonstrate improved functional ability with FOTO limitation <=22% disability.  Progressing, not met     Long Term Goals (10 Weeks):  1. Pt able to walk 1 hour with household chores with <2/10 pain.   Progressing, not met  2.  Pt able to improve flexibility by a full grade to allow for increased ease with squatting and lifting activities.  Progressing, not met  3. Pt able to improve DF ROM by >=10% to allow pt to ascend/descend 1 flight of stairs, independently, 1 handrail, with increased ease.  Progressing, not met  4. Pt to improve strength to 5/5 to allow for increased ease with  running forwards and laterally.  Progressing, not met  5. Pt to demonstrate improved functional ability with FOTO limitation <=17% disability.  Progressing, not met  6. Pt will be independent with HEP and self management of symptoms.   Progressing, not met    PLAN: Outpatient Physical Therapy 1-2  times weekly for 9 weeks to include the following interventions: Aquatic Therapy, cervical/lumbar mechanical traction, Electrical Stimulation prn, Gait Training, Iontophoresis (with dexamethasone prn), Manual Therapy, Moist Heat/ Ice, Neuromuscular Re-ed, Patient Education, Self Care, Therapeutic Activites, Therapeutic Exercise and IASTYM, therapeutic taping, dry needling. Progress HEP towards D/C. Recommend F/U with MD if symptoms worsen or do not resolve. Patient may be seen by a PTA for treatment to carry out their plan of care.  Face-to-face conferences will be held.

## 2019-10-22 ENCOUNTER — CLINICAL SUPPORT (OUTPATIENT)
Dept: REHABILITATION | Facility: OTHER | Age: 34
End: 2019-10-22
Attending: FAMILY MEDICINE
Payer: COMMERCIAL

## 2019-10-22 DIAGNOSIS — M25.571 CHRONIC PAIN OF RIGHT ANKLE: ICD-10-CM

## 2019-10-22 DIAGNOSIS — R68.89 DECREASED FUNCTIONAL ACTIVITY TOLERANCE: ICD-10-CM

## 2019-10-22 DIAGNOSIS — M25.60 DECREASED RANGE OF MOTION WITH DECREASED STRENGTH: ICD-10-CM

## 2019-10-22 DIAGNOSIS — R53.1 DECREASED RANGE OF MOTION WITH DECREASED STRENGTH: ICD-10-CM

## 2019-10-22 DIAGNOSIS — M53.86 DECREASED ROM OF LUMBAR SPINE: ICD-10-CM

## 2019-10-22 DIAGNOSIS — R53.1 WEAKNESS: ICD-10-CM

## 2019-10-22 DIAGNOSIS — G89.29 CHRONIC PAIN OF RIGHT ANKLE: ICD-10-CM

## 2019-10-22 DIAGNOSIS — M25.60 DECREASED MOBILITY OF JOINT: ICD-10-CM

## 2019-10-22 PROCEDURE — 97110 THERAPEUTIC EXERCISES: CPT

## 2019-10-22 NOTE — PROGRESS NOTES
[facility]      Name: Lisa Garcia  Clinic Number: [id number]    Therapy Diagnosis:   No diagnosis found.  Physician: Phyllis Montana PA-C    Visit Date: 10/22/2019    Physician Orders: PT Eval and Treat      Medical Diagnosis from Referral:   M79.18 (ICD-10-CM) - Myofascial muscle pain  M54.14,M54.17 (ICD-10-CM) - Thoracic and lumbosacral neuritis  M54.41,G89.29 (ICD-10-CM) - Chronic bilateral low back pain with right-sided sciatica     Evaluation Date: 2019  Authorization Period Expiration: 19  Plan of Care Expiration: 19  Reassessment Due: 19  Visit # / Visits authorized:      Time In: 5  Time Out: 555  Total Billable Time: 30minutes     Precautions: Standard     Pattern of pain determined:       Subjective   Lisa reports he is decreasing [facility] sessions to 1x/wk secondary to time constraints.  Pt also attending PT for ankle instability.  Pt reports he is going to attend 1x/wk at each clinic    Patient reports tolerating previous visit well with minimal soreness   Patient reports their pain to be 210 on a 0-10 scale with 0 being no pain and 10 being the worst pain imaginable.  Pain Location: B hip, low back     Occupation: [employer], .   Leisure: Gym, Tennis       Objective     Baseline Isometric Testing on Med X equipment: Testing administered by PT  Date of testin19  ROM 0-42 deg   Max Peak Torque 246    Min Peak Torque 69    Flex/Ext Ratio 3.56   % below normative data 30      Starting weight 70-75#        CMS Impairment/Limitation/Restriction for FOTO Lumbar Spine Survey  Status Limitation G-Code CMS Severity Modifier  Intake 49% 51% Current Status CK - At least 40 percent but less than 60 percent  Predicted 66% 34% Goal Status+ CJ - At least 20 percent but less than 40 percent  D/C Status CK **only report if this is a one time visit      Treatment    Pt was instructed in and performed the following:     Lisa received  therapeutic exercises to develop/improved posture, cardiovascular endurance, muscular endurance, lumbar/cervical ROM, strength and muscular endurance for 55 minutes including the following exercises:   [facility] 10/22/2019   Visit Number 4   VAS Pain Rating 2   Treadmill Time (in min.) -   Time 10   Lumbar Extension Seat Pad -   Femur Restraint -   Top Dead Center -   Counterweight -   Lumbar Flexion -   Lumbar Extension -   Lumbar Peak Torque -   Min Torque -   Test Percent Below Normative Data -   Lumbar Weight 75   Repetitions 20   Rating of Perceived Exertion 7   Ice - Z Lie (in min.) 10     DKTC 10x  Piriformis stretch 2x20 sec  PPT 10x  Prone rectus femoris stretch 2x20 sec (NT)  Bridges 10x  Clams GTB 10x  Prone hip ext   MET for correction of R ant innominate N/P    Peripheral muscle strengthening which included 1 set of 15-20 repetitions at a slow, controlled 10-13 second per rep pace focused on strengthening supporting musculature for improved body mechanics and functional mobility.  Pt and therapist focused on proper form during treatment to ensure optimal strengthening of each targeted muscle group.  Machines were utilized including torso rotation, leg extension, leg curl, chest press, upright row. Tricep extension, bicep curl, leg press, and hip abduction added visit 3    Lisa received the following manual therapy techniques: NA       Home Exercises Provided and Patient Education Provided     Education provided:   - HEP importance  - RPE     Written Home Exercises Provided: Patient instructed to cont prior HEP.  Exercises were reviewed and Lisa was able to demonstrate them prior to the end of the session.  Lisa demonstrated good  understanding of the education provided.     See EMR under Patient Instructions for exercises provided prior visit.      Assessment     Pt arrives today with min LBP. Pelvic girdle land marks symmetrical, no MET performed today.  Pt completed 20 reps at 75ft/lbs  with 7/10 exertion level, increase 5% next visit. Discussed increasing stretching at home to decrease pain and stiffness in AM.  Pt reports he stretches 1x/day currently.  Encourged pt to stretch AM/PM  Patient is making good progress towards established goals.  Pt will continue to benefit from skilled outpatient physical therapy to address the deficits stated in the impairment chart, provide pt/family education and to maximize pt's level of independence in the home and community environment.     Anticipated Barriers for therapy: None  Pt's spiritual, cultural and educational needs considered and pt agreeable to plan of care and goals as stated below:       GOALS: Pt is in agreement with the following goals.     Short term goals:  6 weeks or 10 visits   1.  Pt will demonstrate increased lumbar ROM by at least 3 degrees from the initial ROM value with improvements noted in functional ROM and ability to perform ADLs.  2.  Pt will demonstrate increased maximum isometric torque value by 10% when compared to the initial value resulting in improved ability to perform bending, lifting, and carrying activities safely, confidently.     3.  Patient report a reduction in worst pain score by 1-2 points for improved tolerance for functional activities.  4.  Pt able to perform HEP correctly with minimal cueing or supervision from therapist to encourage independent management of symptoms.         Long term goals: 13 weeks or 20 visits   1. Pt will demonstrate increased lumbar ROM by at least 6 degrees from initial ROM value, resulting in improved ability to perform functional fwd bending while standing and sitting.   2. Pt will demonstrate increased maximum isometric torque value by 20% when compared to the initial value resulting in improved ability to perform bending, lifting, and carrying activities safely, confidently.  3. Pt to demonstrate ability to independently control and reduce their pain through posture positioning and  mechanical movements throughout a typical day.  4.  Pt will demonstrate reduced pain and improved functional outcomes as reported on the FOTO by reaching a score of CJ = at least 20% but < 40% impaired, limited or restricted or less in order to demonstrate subjective improvement in pt's condition.    5. Pt will demonstrate independence with the HEP at discharge              Plan   Continue with established Plan of Care towards established PT goals.

## 2019-10-29 ENCOUNTER — CLINICAL SUPPORT (OUTPATIENT)
Dept: REHABILITATION | Facility: OTHER | Age: 34
End: 2019-10-29
Attending: FAMILY MEDICINE
Payer: COMMERCIAL

## 2019-10-29 DIAGNOSIS — M25.60 DECREASED RANGE OF MOTION WITH DECREASED STRENGTH: ICD-10-CM

## 2019-10-29 DIAGNOSIS — M25.60 DECREASED MOBILITY OF JOINT: ICD-10-CM

## 2019-10-29 DIAGNOSIS — G89.29 CHRONIC PAIN OF RIGHT ANKLE: ICD-10-CM

## 2019-10-29 DIAGNOSIS — M25.571 CHRONIC PAIN OF RIGHT ANKLE: ICD-10-CM

## 2019-10-29 DIAGNOSIS — R53.1 DECREASED RANGE OF MOTION WITH DECREASED STRENGTH: ICD-10-CM

## 2019-10-29 PROCEDURE — 97110 THERAPEUTIC EXERCISES: CPT

## 2019-10-29 NOTE — PROGRESS NOTES
[facility]      Name: Lisa Garcia  Clinic Number: [id number]    Therapy Diagnosis:   Encounter Diagnoses   Name Primary?    Chronic pain of right ankle     Decreased range of motion with decreased strength     Decreased mobility of joint      Physician: Phyllis Montana PA-C    Visit Date: 10/29/2019    Physician Orders: PT Eval and Treat      Medical Diagnosis from Referral:   M79.18 (ICD-10-CM) - Myofascial muscle pain  M54.14,M54.17 (ICD-10-CM) - Thoracic and lumbosacral neuritis  M54.41,G89.29 (ICD-10-CM) - Chronic bilateral low back pain with right-sided sciatica     Evaluation Date: 2019  Authorization Period Expiration: 19  Plan of Care Expiration: 19  Reassessment Due: 19  Visit # / Visits authorized:      Time In: 500  Time Out: 555  Total Billable Time: 40minutes     Precautions: Standard     Pattern of pain determined:       Subjective   Lisa reports he is experiencing minimal back pain today.   Patient reports tolerating previous visit well with minimal soreness   Patient reports their pain to be 2/10 on a 0-10 scale with 0 being no pain and 10 being the worst pain imaginable.  Pain Location: B hip, low back     Occupation: [employer], .   Leisure: Gym, Tennis       Objective     Baseline Isometric Testing on Med X equipment: Testing administered by PT  Date of testin19  ROM 0-42 deg   Max Peak Torque 246    Min Peak Torque 69    Flex/Ext Ratio 3.56   % below normative data 30      Starting weight 70-75#        CMS Impairment/Limitation/Restriction for FOTO Lumbar Spine Survey  Status Limitation G-Code CMS Severity Modifier  Intake 49% 51% Current Status CK - At least 40 percent but less than 60 percent  Predicted 66% 34% Goal Status+ CJ - At least 20 percent but less than 40 percent  D/C Status CK **only report if this is a one time visit      Treatment    Pt was instructed in and performed the following:      Lisa received therapeutic exercises to develop/improved posture, cardiovascular endurance, muscular endurance, lumbar/cervical ROM, strength and muscular endurance for 55 minutes including the following exercises:   [facility] 10/30/2019   Visit Number 5   VAS Pain Rating 2   Treadmill Time (in min.) 10   Time -   Lumbar Extension Seat Pad -   Femur Restraint -   Top Dead Center -   Counterweight -   Lumbar Flexion -   Lumbar Extension -   Lumbar Peak Torque -   Min Torque -   Test Percent Below Normative Data -   Lumbar Weight 78   Repetitions 15   Rating of Perceived Exertion 3   Ice - Z Lie (in min.) 10     DKTC 10x  Piriformis stretch 2x20 sec  PPT 10x  Prone rectus femoris stretch 2x20 sec (NT)  Bridges 10x  Clams GTB 10x  Prone hip ext   MET for correction of R ant innominate N/P    Peripheral muscle strengthening which included 1 set of 15-20 repetitions at a slow, controlled 10-13 second per rep pace focused on strengthening supporting musculature for improved body mechanics and functional mobility.  Pt and therapist focused on proper form during treatment to ensure optimal strengthening of each targeted muscle group.  Machines were utilized including torso rotation, leg extension, leg curl, chest press, upright row. Tricep extension, bicep curl, leg press, and hip abduction added visit 3    Lisa received the following manual therapy techniques: NA       Home Exercises Provided and Patient Education Provided     Education provided:   - HEP importance  - RPE     Written Home Exercises Provided: Patient instructed to cont prior HEP.  Exercises were reviewed and Lisa was able to demonstrate them prior to the end of the session.  Lisa demonstrated good  understanding of the education provided.     See EMR under Patient Instructions for exercises provided prior visit.      Assessment     Pt able to complete all components of session with no adverse effects. Increased resistance by 5% today, able  to complete 15 repetitions with appropriate RPE. Will continue to progress as tolerated.   Patient is making good progress towards established goals.  Pt will continue to benefit from skilled outpatient physical therapy to address the deficits stated in the impairment chart, provide pt/family education and to maximize pt's level of independence in the home and community environment.     Anticipated Barriers for therapy: None  Pt's spiritual, cultural and educational needs considered and pt agreeable to plan of care and goals as stated below:       GOALS: Pt is in agreement with the following goals.     Short term goals:  6 weeks or 10 visits   1.  Pt will demonstrate increased lumbar ROM by at least 3 degrees from the initial ROM value with improvements noted in functional ROM and ability to perform ADLs.  2.  Pt will demonstrate increased maximum isometric torque value by 10% when compared to the initial value resulting in improved ability to perform bending, lifting, and carrying activities safely, confidently.     3.  Patient report a reduction in worst pain score by 1-2 points for improved tolerance for functional activities.  4.  Pt able to perform HEP correctly with minimal cueing or supervision from therapist to encourage independent management of symptoms.         Long term goals: 13 weeks or 20 visits   1. Pt will demonstrate increased lumbar ROM by at least 6 degrees from initial ROM value, resulting in improved ability to perform functional fwd bending while standing and sitting.   2. Pt will demonstrate increased maximum isometric torque value by 20% when compared to the initial value resulting in improved ability to perform bending, lifting, and carrying activities safely, confidently.  3. Pt to demonstrate ability to independently control and reduce their pain through posture positioning and mechanical movements throughout a typical day.  4.  Pt will demonstrate reduced pain and improved functional  outcomes as reported on the FOTO by reaching a score of CJ = at least 20% but < 40% impaired, limited or restricted or less in order to demonstrate subjective improvement in pt's condition.    5. Pt will demonstrate independence with the HEP at discharge              Plan   Continue with established Plan of Care towards established PT goals.

## 2019-11-05 ENCOUNTER — CLINICAL SUPPORT (OUTPATIENT)
Dept: REHABILITATION | Facility: OTHER | Age: 34
End: 2019-11-05
Attending: FAMILY MEDICINE
Payer: COMMERCIAL

## 2019-11-05 DIAGNOSIS — M25.571 CHRONIC PAIN OF RIGHT ANKLE: ICD-10-CM

## 2019-11-05 DIAGNOSIS — M25.60 DECREASED MOBILITY OF JOINT: ICD-10-CM

## 2019-11-05 DIAGNOSIS — M25.60 DECREASED RANGE OF MOTION WITH DECREASED STRENGTH: ICD-10-CM

## 2019-11-05 DIAGNOSIS — G89.29 CHRONIC PAIN OF RIGHT ANKLE: ICD-10-CM

## 2019-11-05 DIAGNOSIS — R53.1 DECREASED RANGE OF MOTION WITH DECREASED STRENGTH: ICD-10-CM

## 2019-11-05 PROCEDURE — 97110 THERAPEUTIC EXERCISES: CPT

## 2019-11-05 NOTE — PROGRESS NOTES
[facility]      Name: Lisa Garcia  Clinic Number: [id number]    Therapy Diagnosis:   Encounter Diagnoses   Name Primary?    Chronic pain of right ankle     Decreased range of motion with decreased strength     Decreased mobility of joint      Physician: Phyllis Montana PA-C    Visit Date: 2019    Physician Orders: PT Eval and Treat      Medical Diagnosis from Referral:   M79.18 (ICD-10-CM) - Myofascial muscle pain  M54.14,M54.17 (ICD-10-CM) - Thoracic and lumbosacral neuritis  M54.41,G89.29 (ICD-10-CM) - Chronic bilateral low back pain with right-sided sciatica     Evaluation Date: 2019  Authorization Period Expiration: 19  Plan of Care Expiration: 19  Reassessment Due: 19  Visit # / Visits authorized:      Time In: 505  Time Out: 555  Total Billable Time: 40minutes     Precautions: Standard     Pattern of pain determined:       Subjective   Lisa reports he is experiencing minimal back pain today.   Patient reports tolerating previous visit well with minimal soreness   Patient reports their pain to be 2/10 on a 0-10 scale with 0 being no pain and 10 being the worst pain imaginable.  Pain Location: B hip, low back     Occupation: [employer], .   Leisure: Gym, Tennis       Objective     Baseline Isometric Testing on Med X equipment: Testing administered by PT  Date of testin19  ROM 0-42 deg   Max Peak Torque 246    Min Peak Torque 69    Flex/Ext Ratio 3.56   % below normative data 30      Starting weight 70-75#        CMS Impairment/Limitation/Restriction for FOTO Lumbar Spine Survey  Status Limitation G-Code CMS Severity Modifier  Intake 49% 51% Current Status CK - At least 40 percent but less than 60 percent  Predicted 66% 34% Goal Status+ CJ - At least 20 percent but less than 40 percent  D/C Status CK **only report if this is a one time visit      Treatment    Pt was instructed in and performed the following:     Lisa  received therapeutic exercises to develop/improved posture, cardiovascular endurance, muscular endurance, lumbar/cervical ROM, strength and muscular endurance for 50 minutes including the following exercises:   [facility] 11/5/2019   Visit Number 6   VAS Pain Rating 2   Treadmill Time (in min.) 5   Time -   Extension in Lying 10   Lumbar Extension Seat Pad -   Femur Restraint -   Top Dead Center -   Counterweight -   Lumbar Flexion -   Lumbar Extension -   Lumbar Peak Torque -   Min Torque -   Test Percent Below Normative Data -   Lumbar Weight 78   Repetitions 18   Rating of Perceived Exertion 5   Ice - Z Lie (in min.) 10       DKTC 10x  Piriformis stretch 2x20 sec  PPT 10x  Prone rectus femoris stretch 2x20 sec (NT)  Bridges 10x  Clams GTB 10x  Prone hip ext   MET for correction of R ant innominate N/P    Peripheral muscle strengthening which included 1 set of 15-20 repetitions at a slow, controlled 10-13 second per rep pace focused on strengthening supporting musculature for improved body mechanics and functional mobility.  Pt and therapist focused on proper form during treatment to ensure optimal strengthening of each targeted muscle group.  Machines were utilized including torso rotation, leg extension, leg curl, chest press, upright row. Tricep extension, bicep curl, leg press, and hip abduction added visit 3    Lisa received the following manual therapy techniques: NA       Home Exercises Provided and Patient Education Provided     Education provided:   - HEP importance  - RPE     Written Home Exercises Provided: Patient instructed to cont prior HEP.  Exercises were reviewed and Lisa was able to demonstrate them prior to the end of the session.  Lisa demonstrated good  understanding of the education provided.     See EMR under Patient Instructions for exercises provided prior visit.      Assessment     Pt able to complete all components of session with no adverse effects. Pt attained 18 reps at  72ft/lbs with 5/10 exertion level .Will continue to progress as tolerated.   Patient is making good progress towards established goals.  Pt will continue to benefit from skilled outpatient physical therapy to address the deficits stated in the impairment chart, provide pt/family education and to maximize pt's level of independence in the home and community environment.     Anticipated Barriers for therapy: None  Pt's spiritual, cultural and educational needs considered and pt agreeable to plan of care and goals as stated below:       GOALS: Pt is in agreement with the following goals.     Short term goals:  6 weeks or 10 visits   1.  Pt will demonstrate increased lumbar ROM by at least 3 degrees from the initial ROM value with improvements noted in functional ROM and ability to perform ADLs.  2.  Pt will demonstrate increased maximum isometric torque value by 10% when compared to the initial value resulting in improved ability to perform bending, lifting, and carrying activities safely, confidently.     3.  Patient report a reduction in worst pain score by 1-2 points for improved tolerance for functional activities.  4.  Pt able to perform HEP correctly with minimal cueing or supervision from therapist to encourage independent management of symptoms.         Long term goals: 13 weeks or 20 visits   1. Pt will demonstrate increased lumbar ROM by at least 6 degrees from initial ROM value, resulting in improved ability to perform functional fwd bending while standing and sitting.   2. Pt will demonstrate increased maximum isometric torque value by 20% when compared to the initial value resulting in improved ability to perform bending, lifting, and carrying activities safely, confidently.  3. Pt to demonstrate ability to independently control and reduce their pain through posture positioning and mechanical movements throughout a typical day.  4.  Pt will demonstrate reduced pain and improved functional outcomes as  reported on the FOTO by reaching a score of CJ = at least 20% but < 40% impaired, limited or restricted or less in order to demonstrate subjective improvement in pt's condition.    5. Pt will demonstrate independence with the HEP at discharge              Plan   Continue with established Plan of Care towards established PT goals.

## 2019-11-12 ENCOUNTER — CLINICAL SUPPORT (OUTPATIENT)
Dept: REHABILITATION | Facility: OTHER | Age: 34
End: 2019-11-12
Attending: FAMILY MEDICINE
Payer: COMMERCIAL

## 2019-11-12 DIAGNOSIS — M25.60 DECREASED MOBILITY OF JOINT: ICD-10-CM

## 2019-11-12 DIAGNOSIS — R53.1 DECREASED RANGE OF MOTION WITH DECREASED STRENGTH: ICD-10-CM

## 2019-11-12 DIAGNOSIS — M25.60 DECREASED RANGE OF MOTION WITH DECREASED STRENGTH: ICD-10-CM

## 2019-11-12 DIAGNOSIS — G89.29 CHRONIC PAIN OF RIGHT ANKLE: ICD-10-CM

## 2019-11-12 DIAGNOSIS — M25.571 CHRONIC PAIN OF RIGHT ANKLE: ICD-10-CM

## 2019-11-12 PROCEDURE — 97110 THERAPEUTIC EXERCISES: CPT

## 2019-11-12 NOTE — PROGRESS NOTES
[facility]      Name: Lisa Garcia  Clinic Number: [id number]    Therapy Diagnosis:   Encounter Diagnoses   Name Primary?    Chronic pain of right ankle     Decreased range of motion with decreased strength     Decreased mobility of joint      Physician: Phyllis Montana PA-C    Visit Date: 2019    Physician Orders: PT Eval and Treat      Medical Diagnosis from Referral:   M79.18 (ICD-10-CM) - Myofascial muscle pain  M54.14,M54.17 (ICD-10-CM) - Thoracic and lumbosacral neuritis  M54.41,G89.29 (ICD-10-CM) - Chronic bilateral low back pain with right-sided sciatica     Evaluation Date: 2019  Authorization Period Expiration: 19  Plan of Care Expiration: 19  Reassessment Due: 19  Visit # / Visits authorized:      Time In: 1703  Time Out: 1800  Total Billable Time: 55 minutes     Precautions: Standard     Pattern of pain determined:       Brenda Gonzalez arrives to PT and reports very minimal sourness to lower back       Patient reports tolerating previous visit well with minimal soreness   Patient reports their pain to be 2/10 on a 0-10 scale with 0 being no pain and 10 being the worst pain imaginable.  Pain Location: B hip, low back     Occupation: [employer], .   Leisure: Gym, Tennis       Objective     Baseline Isometric Testing on Med X equipment: Testing administered by PT  Date of testin19  ROM 0-42 deg   Max Peak Torque 246    Min Peak Torque 69    Flex/Ext Ratio 3.56   % below normative data 30      Starting weight 70-75#        CMS Impairment/Limitation/Restriction for FOTO Lumbar Spine Survey  Status Limitation G-Code CMS Severity Modifier  Intake 49% 51% Current Status CK - At least 40 percent but less than 60 percent  Predicted 66% 34% Goal Status+ CJ - At least 20 percent but less than 40 percent  D/C Status CK **only report if this is a one time visit      Treatment    Pt was instructed in and performed the  following:     Lisa received therapeutic exercises to develop/improved posture, cardiovascular endurance, muscular endurance, lumbar/cervical ROM, strength and muscular endurance for 55 minutes including the following exercises:     [facility] 11/12/2019   Visit Number 7   VAS Pain Rating 2   Treadmill Time (in min.) -   Time 7   Extension in Lying -   Flexion in Lying 10   Lumbar Extension Seat Pad -   Femur Restraint -   Top Dead Center -   Counterweight -   Lumbar Flexion -   Lumbar Extension -   Lumbar Peak Torque -   Min Torque -   Test Percent Below Normative Data -   Lumbar Weight 78   Repetitions 20   Rating of Perceived Exertion 3   Ice - Z Lie (in min.) 10         DKTC 10x  Piriformis stretch 3x 15sec  PPT 10x  EIL   Prone rectus femoris stretch 2x20 sec (NT)  Bridges 10x  Clams GTB 10x  Prone hip ext   MET for correction of R ant innominate N/P    Peripheral muscle strengthening which included 1 set of 15-20 repetitions at a slow, controlled 10-13 second per rep pace focused on strengthening supporting musculature for improved body mechanics and functional mobility.  Pt and therapist focused on proper form during treatment to ensure optimal strengthening of each targeted muscle group.  Machines were utilized including torso rotation, leg extension, leg curl, chest press, upright row. Tricep extension, bicep curl, leg press, and hip abduction added visit 3    Lisa received the following manual therapy techniques: NA       Home Exercises Provided and Patient Education Provided     Education provided:   - HEP importance  - RPE     Written Home Exercises Provided: Patient instructed to cont prior HEP.  Exercises were reviewed and Lisa was able to demonstrate them prior to the end of the session.  Lisa demonstrated good  understanding of the education provided.     See EMR under Patient Instructions for exercises provided prior visit.      Assessment       Patient completed 20 reps at 78#, with an  RPE of 3. He ha no new c/o today. He is appropriate for a ROM increase, and or weight increase of 5% at next visit.      Patient is making good progress towards established goals.  Pt will continue to benefit from skilled outpatient physical therapy to address the deficits stated in the impairment chart, provide pt/family education and to maximize pt's level of independence in the home and community environment.     Anticipated Barriers for therapy: None  Pt's spiritual, cultural and educational needs considered and pt agreeable to plan of care and goals as stated below:       GOALS: Pt is in agreement with the following goals.     Short term goals:  6 weeks or 10 visits   1.  Pt will demonstrate increased lumbar ROM by at least 3 degrees from the initial ROM value with improvements noted in functional ROM and ability to perform ADLs.  2.  Pt will demonstrate increased maximum isometric torque value by 10% when compared to the initial value resulting in improved ability to perform bending, lifting, and carrying activities safely, confidently.     3.  Patient report a reduction in worst pain score by 1-2 points for improved tolerance for functional activities.  4.  Pt able to perform HEP correctly with minimal cueing or supervision from therapist to encourage independent management of symptoms.         Long term goals: 13 weeks or 20 visits   1. Pt will demonstrate increased lumbar ROM by at least 6 degrees from initial ROM value, resulting in improved ability to perform functional fwd bending while standing and sitting.   2. Pt will demonstrate increased maximum isometric torque value by 20% when compared to the initial value resulting in improved ability to perform bending, lifting, and carrying activities safely, confidently.  3. Pt to demonstrate ability to independently control and reduce their pain through posture positioning and mechanical movements throughout a typical day.  4.  Pt will demonstrate reduced  pain and improved functional outcomes as reported on the FOTO by reaching a score of CJ = at least 20% but < 40% impaired, limited or restricted or less in order to demonstrate subjective improvement in pt's condition.    5. Pt will demonstrate independence with the HEP at discharge              Plan   Continue with established Plan of Care towards established PT goals.

## 2019-11-19 ENCOUNTER — CLINICAL SUPPORT (OUTPATIENT)
Dept: REHABILITATION | Facility: OTHER | Age: 34
End: 2019-11-19
Attending: FAMILY MEDICINE
Payer: COMMERCIAL

## 2019-11-19 DIAGNOSIS — M53.86 DECREASED ROM OF LUMBAR SPINE: ICD-10-CM

## 2019-11-19 DIAGNOSIS — R68.89 DECREASED FUNCTIONAL ACTIVITY TOLERANCE: ICD-10-CM

## 2019-11-19 DIAGNOSIS — R53.1 WEAKNESS: ICD-10-CM

## 2019-11-19 PROCEDURE — 97110 THERAPEUTIC EXERCISES: CPT

## 2019-11-19 NOTE — PROGRESS NOTES
[facility]      Name: Lisa Garcia  Clinic Number: [id number]    Therapy Diagnosis:   Encounter Diagnoses   Name Primary?    Decreased ROM of lumbar spine     Decreased functional activity tolerance     Weakness      Physician: Phyllis Montana PA-C    Visit Date: 2019    Physician Orders: PT Eval and Treat      Medical Diagnosis from Referral:   M79.18 (ICD-10-CM) - Myofascial muscle pain  M54.14,M54.17 (ICD-10-CM) - Thoracic and lumbosacral neuritis  M54.41,G89.29 (ICD-10-CM) - Chronic bilateral low back pain with right-sided sciatica     Evaluation Date: 2019  Authorization Period Expiration: 19  Plan of Care Expiration: 19  Reassessment Due: 19  Visit # / Visits authorized:      Time In: 1710  Time Out: 1800  Total Billable Time: 50 minutes     Precautions: Standard     Pattern of pain determined:       Subjective   Lisa arrives to PT and reports feeling sore, as he started [employer] yesterday. His hips are hurting. His back is okay with a pain of 5/10.     Patient reports tolerating previous visit well with minimal soreness   Patient reports their pain to be 5/10 on a 0-10 scale with 0 being no pain and 10 being the worst pain imaginable.  Pain Location: B hip, low back     Occupation: [employer], .     Leisure: Gym, Tennis     Objective     Baseline Isometric Testing on Med X equipment: Testing administered by PT  Date of testin19  ROM 0-42 deg   Max Peak Torque 246    Min Peak Torque 69    Flex/Ext Ratio 3.56   % below normative data 30         CMS Impairment/Limitation/Restriction for FOTO Lumbar Spine Survey  Status Limitation G-Code CMS Severity Modifier  Intake 49% 51% Current Status CK - At least 40 percent but less than 60 percent  Predicted 66% 34% Goal Status+ CJ - At least 20 percent but less than 40 percent  D/C Status CK **only report if this is a one time visit      Treatment    Pt was instructed in  and performed the following:     Lisa received therapeutic exercises to develop/improved posture, cardiovascular endurance, muscular endurance, lumbar/cervical ROM, strength and muscular endurance for 50 minutes including the following exercises:     [facility] 11/19/2019   Visit Number 8   VAS Pain Rating 5   Treadmill Time (in min.) 5   Time -   Extension in Lying 10   Flexion in Lying 10   Lumbar Extension Seat Pad -   Femur Restraint -   Top Dead Center -   Counterweight -   Lumbar Flexion -   Lumbar Extension -   Lumbar Peak Torque -   Min Torque -   Test Percent Below Normative Data -   Lumbar Weight 82   Repetitions 16   Rating of Perceived Exertion 3   Ice - Z Lie (in min.) 10       DKTC 10x  Piriformis stretch 3x 15sec  PPT 10x  EIL  x10    Prone rectus femoris stretch 2x20 sec (NT)  Bridges 10x  Clams GTB 10x  Prone hip ext   MET for correction of R ant innominate N/P    HEP for hips stretches:  (See patient instructions on 11/19/19)      Peripheral muscle strengthening which included 1 set of 15-20 repetitions at a slow, controlled 10-13 second per rep pace focused on strengthening supporting musculature for improved body mechanics and functional mobility.  Pt and therapist focused on proper form during treatment to ensure optimal strengthening of each targeted muscle group.  Machines were utilized including torso rotation, leg extension, leg curl, chest press, upright row. Tricep extension, bicep curl, leg press, and hip abduction added visit 3    Lisa received the following manual therapy techniques: NA       Home Exercises Provided and Patient Education Provided     Education provided:   - HEP importance  - RPE     Written Home Exercises Provided: Patient instructed to cont prior HEP.  Exercises were reviewed and Lisa was able to demonstrate them prior to the end of the session.  Lisa demonstrated good  understanding of the education provided.     See EMR under Patient Instructions for  exercises provided prior visit.      Assessment       Patient was given a 5% weight increase to 82# today on lumbar medx. He completed 16 reps at an RPE of 3. Continue to progress as tolerated. He was provided with HEP for hip stretches (see patient instructions).     Patient is making good progress towards established goals.  Pt will continue to benefit from skilled outpatient physical therapy to address the deficits stated in the impairment chart, provide pt/family education and to maximize pt's level of independence in the home and community environment.     Anticipated Barriers for therapy: None  Pt's spiritual, cultural and educational needs considered and pt agreeable to plan of care and goals as stated below:       GOALS: Pt is in agreement with the following goals.     Short term goals:  6 weeks or 10 visits   1.  Pt will demonstrate increased lumbar ROM by at least 3 degrees from the initial ROM value with improvements noted in functional ROM and ability to perform ADLs.  2.  Pt will demonstrate increased maximum isometric torque value by 10% when compared to the initial value resulting in improved ability to perform bending, lifting, and carrying activities safely, confidently.     3.  Patient report a reduction in worst pain score by 1-2 points for improved tolerance for functional activities.  4.  Pt able to perform HEP correctly with minimal cueing or supervision from therapist to encourage independent management of symptoms.         Long term goals: 13 weeks or 20 visits   1. Pt will demonstrate increased lumbar ROM by at least 6 degrees from initial ROM value, resulting in improved ability to perform functional fwd bending while standing and sitting.   2. Pt will demonstrate increased maximum isometric torque value by 20% when compared to the initial value resulting in improved ability to perform bending, lifting, and carrying activities safely, confidently.  3. Pt to demonstrate ability to  independently control and reduce their pain through posture positioning and mechanical movements throughout a typical day.  4.  Pt will demonstrate reduced pain and improved functional outcomes as reported on the FOTO by reaching a score of CJ = at least 20% but < 40% impaired, limited or restricted or less in order to demonstrate subjective improvement in pt's condition.    5. Pt will demonstrate independence with the HEP at discharge       Plan   Continue with established Plan of Care towards established PT goals.

## 2019-11-20 ENCOUNTER — PATIENT MESSAGE (OUTPATIENT)
Dept: PRIMARY CARE CLINIC | Facility: CLINIC | Age: 34
End: 2019-11-20

## 2019-11-20 DIAGNOSIS — M54.9 BACK PAIN, UNSPECIFIED BACK LOCATION, UNSPECIFIED BACK PAIN LATERALITY, UNSPECIFIED CHRONICITY: ICD-10-CM

## 2019-11-20 DIAGNOSIS — M25.559 ARTHRALGIA OF HIP, UNSPECIFIED LATERALITY: Primary | ICD-10-CM

## 2019-11-26 ENCOUNTER — CLINICAL SUPPORT (OUTPATIENT)
Dept: REHABILITATION | Facility: OTHER | Age: 34
End: 2019-11-26
Attending: FAMILY MEDICINE
Payer: COMMERCIAL

## 2019-11-26 DIAGNOSIS — R53.1 WEAKNESS: ICD-10-CM

## 2019-11-26 DIAGNOSIS — R68.89 DECREASED FUNCTIONAL ACTIVITY TOLERANCE: ICD-10-CM

## 2019-11-26 DIAGNOSIS — M53.86 DECREASED ROM OF LUMBAR SPINE: ICD-10-CM

## 2019-11-26 PROCEDURE — 97110 THERAPEUTIC EXERCISES: CPT

## 2019-11-26 NOTE — PROGRESS NOTES
[facility]      Name: Lisa Garcia  Clinic Number: [id number]    Therapy Diagnosis:   Encounter Diagnoses   Name Primary?    Decreased ROM of lumbar spine     Decreased functional activity tolerance     Weakness      Physician: Phyllis Montana PA-C    Visit Date: 2019    Physician Orders: PT Eval and Treat      Medical Diagnosis from Referral:   M79.18 (ICD-10-CM) - Myofascial muscle pain  M54.14,M54.17 (ICD-10-CM) - Thoracic and lumbosacral neuritis  M54.41,G89.29 (ICD-10-CM) - Chronic bilateral low back pain with right-sided sciatica     Evaluation Date: 2019  Authorization Period Expiration: 19  Plan of Care Expiration: 19  Reassessment Due: 19  Visit # / Visits authorized:      Time In: 1700  Time Out: 1800  Total Billable Time: 60 minutes     Precautions: Standard     Pattern of pain determined:       Subjective   Lisa continues to complete [employer] theory classes, and he is sore with a L side LBP of 7/10 currently.     Patient reports tolerating previous visit well with minimal soreness   Patient reports their pain to be 7/10 on a 0-10 scale with 0 being no pain and 10 being the worst pain imaginable.  Pain Location: B hip, low back     Occupation: [employer], .   Leisure: Gym, Tennis     Objective     Baseline Isometric Testing on Med X equipment: Testing administered by PT  Date of testin19  ROM 0-42 deg   Max Peak Torque 246    Min Peak Torque 69    Flex/Ext Ratio 3.56   % below normative data 30         CMS Impairment/Limitation/Restriction for FOTO Lumbar Spine Survey  Status Limitation G-Code CMS Severity Modifier  Intake 49% 51% Current Status CK - At least 40 percent but less than 60 percent  Predicted 66% 34% Goal Status+ CJ - At least 20 percent but less than 40 percent  D/C Status CK **only report if this is a one time visit      Treatment    Pt was instructed in and performed the following:     Lisa  received therapeutic exercises to develop/improved posture, cardiovascular endurance, muscular endurance, lumbar/cervical ROM, strength and muscular endurance for 51 minutes including the following exercises:     [facility] 11/26/2019   Visit Number 9   VAS Pain Rating 5   Treadmill Time (in min.) 7   Time -   Extension in Lying 10   Flexion in Lying 10   Manual Therapy 8   Lumbar Extension Seat Pad -   Femur Restraint -   Top Dead Center -   Counterweight -   Lumbar Flexion -   Lumbar Extension -   Lumbar Peak Torque -   Min Torque -   Test Percent Below Normative Data -   Lumbar Weight 82   Repetitions 18   Rating of Perceived Exertion 7   Ice - Z Lie (in min.) 10       DKTC 10x  Piriformis stretch 3x 15sec  PPT 10x  EIL  x10    Prone rectus femoris stretch 2x20 sec (NT)  Bridges 10x  Clams GTB 10x  Prone hip ext   MET for correction of R ant innominate N/P    HEP for hips stretches:  (See patient instructions on 11/19/19)    Peripheral muscle strengthening which included 1 set of 15-20 repetitions at a slow, controlled 10-13 second per rep pace focused on strengthening supporting musculature for improved body mechanics and functional mobility.  Pt and therapist focused on proper form during treatment to ensure optimal strengthening of each targeted muscle group.  Machines were utilized including torso rotation, leg extension, leg curl, chest press, upright row. Tricep extension, bicep curl, leg press, and hip abduction added visit 3    Lisa received the following manual therapy techniques: 8 Minutes  STM/MFR, Vacuum cupping (at L side LS) and xfibers massage with jammie to LS and L side SIJ.     Home Exercises Provided and Patient Education Provided     Education provided:   - HEP importance  - RPE     Written Home Exercises Provided: Patient instructed to cont prior HEP.  Exercises were reviewed and Lisa was able to demonstrate them prior to the end of the session.  Lisa demonstrated good   understanding of the education provided.     See EMR under Patient Instructions for exercises provided prior visit.      Assessment     Patient reported manual therapy helped a lot with pain (7/10==>5/10), and lumbar extension  exercise tolerance. He completed 18 reps at 82#, but with an RPE of 7. Continue to progress as tolerated.   Patient is making good progress towards established goals.  Pt will continue to benefit from skilled outpatient physical therapy to address the deficits stated in the impairment chart, provide pt/family education and to maximize pt's level of independence in the home and community environment.     Anticipated Barriers for therapy: None  Pt's spiritual, cultural and educational needs considered and pt agreeable to plan of care and goals as stated below:       GOALS: Pt is in agreement with the following goals.     Short term goals:  6 weeks or 10 visits   1.  Pt will demonstrate increased lumbar ROM by at least 3 degrees from the initial ROM value with improvements noted in functional ROM and ability to perform ADLs.  2.  Pt will demonstrate increased maximum isometric torque value by 10% when compared to the initial value resulting in improved ability to perform bending, lifting, and carrying activities safely, confidently.     3.  Patient report a reduction in worst pain score by 1-2 points for improved tolerance for functional activities.  4.  Pt able to perform HEP correctly with minimal cueing or supervision from therapist to encourage independent management of symptoms.         Long term goals: 13 weeks or 20 visits   1. Pt will demonstrate increased lumbar ROM by at least 6 degrees from initial ROM value, resulting in improved ability to perform functional fwd bending while standing and sitting.   2. Pt will demonstrate increased maximum isometric torque value by 20% when compared to the initial value resulting in improved ability to perform bending, lifting, and carrying  activities safely, confidently.  3. Pt to demonstrate ability to independently control and reduce their pain through posture positioning and mechanical movements throughout a typical day.  4.  Pt will demonstrate reduced pain and improved functional outcomes as reported on the FOTO by reaching a score of CJ = at least 20% but < 40% impaired, limited or restricted or less in order to demonstrate subjective improvement in pt's condition.    5. Pt will demonstrate independence with the HEP at discharge       Plan   Continue with established Plan of Care towards established PT goals.

## 2019-12-10 ENCOUNTER — CLINICAL SUPPORT (OUTPATIENT)
Dept: REHABILITATION | Facility: OTHER | Age: 34
End: 2019-12-10
Attending: FAMILY MEDICINE
Payer: COMMERCIAL

## 2019-12-10 DIAGNOSIS — M53.86 DECREASED ROM OF LUMBAR SPINE: ICD-10-CM

## 2019-12-10 DIAGNOSIS — R53.1 WEAKNESS: ICD-10-CM

## 2019-12-10 DIAGNOSIS — R68.89 DECREASED FUNCTIONAL ACTIVITY TOLERANCE: ICD-10-CM

## 2019-12-10 PROCEDURE — 97110 THERAPEUTIC EXERCISES: CPT

## 2019-12-10 NOTE — PROGRESS NOTES
[facility] Physical Therapy Treatment      Name: Lisa Garcia  Clinic Number: [id number]    Therapy Diagnosis:   Encounter Diagnoses   Name Primary?    Decreased ROM of lumbar spine     Decreased functional activity tolerance     Weakness      Physician: Phyllis Montana PA-C    Visit Date: 12/10/2019    Physician Orders: PT Eval and Treat      Medical Diagnosis from Referral:   M79.18 (ICD-10-CM) - Myofascial muscle pain  M54.14,M54.17 (ICD-10-CM) - Thoracic and lumbosacral neuritis  M54.41,G89.29 (ICD-10-CM) - Chronic bilateral low back pain with right-sided sciatica     Evaluation Date: 2019  Authorization Period Expiration: 19  Plan of Care Expiration: 19  Reassessment Due: 1/10/2020  Visit # / Visits authorized: 10/ 20     Time In: 1700  Time Out: 1800  Total Billable Time: 60 minutes     Precautions: Standard     Pattern of pain determined:       Brenda Gonzalez arrives to PT and reports working out at [employer]. He has a pain of 4/10 current at the lower back. Patient has not tried tennis, or other     Patient reports tolerating previous visit well with minimal soreness   Patient reports their pain to be 7/10 on a 0-10 scale with 0 being no pain and 10 being the worst pain imaginable.  Pain Location: B hip, low back     Occupation: [employer], .     Leisure: Gym, Tennis     Objective     Baseline Isometric Testing on Med X equipment: Testing administered by PT  Date of testin19  ROM 0-42 deg   Max Peak Torque 246    Min Peak Torque 69    Flex/Ext Ratio 3.56   % below normative data 30         Baseline Isometric Testing on Med X equipment: Testing administered by PT  Date of testin19  ROM 0-45 deg   Max Peak Torque 261   Min Peak Torque 147   Flex/Ext Ratio 1.77   % below normative data 12      % IM strength gain from baseline: 35%  Total ROM gain from baseline: 3 deg flexion           Treatment    Pt was instructed in and performed the  "following:     Lisa received therapeutic exercises to develop/improved posture, cardiovascular endurance, muscular endurance, lumbar/cervical ROM, strength and muscular endurance for 55 minutes including the following exercises:     [facility] 12/10/2019   Visit Number 10   VAS Pain Rating -   Treadmill Time (in min.) 10   Time -   Extension in Lying 10   Flexion in Lying 10   Manual Therapy -   Lumbar Extension Seat Pad -   Femur Restraint -   Top Dead Center -   Counterweight -   Lumbar Flexion 45   Lumbar Extension 0   Lumbar Peak Torque 261   Min Torque 147   Test Percent Below Normative Data 12   Test Percent Gain in Strength from Initial  35   Lumbar Weight 60   Repetitions -   Rating of Perceived Exertion -   Ice - Z Lie (in min.) 10       DKTC 10x with 5" holds  Piriformis stretch 2x 20"  PPT x15 with 5" hlds  EIL  x10    Part of HEP:  Hip IR TFL/ITB stretch supine B  Prone Hip Ext    Prone rectus femoris stretch 2x20 sec (NT)  Bridges 10x  Clams GTB 10x  Prone hip ext   MET for correction of R ant innominate N/P    HEP for hips stretches:  (See patient instructions on 11/19/19)    Peripheral muscle strengthening which included 1 set of 15-20 repetitions at a slow, controlled 10-13 second per rep pace focused on strengthening supporting musculature for improved body mechanics and functional mobility.  Pt and therapist focused on proper form during treatment to ensure optimal strengthening of each targeted muscle group.  Machines were utilized including torso rotation, leg extension, leg curl, chest press, upright row. Tricep extension, bicep curl, leg press, and hip abduction added visit 3    Lisa received the following manual therapy techniques: 8 Minutes  STM/MFR, Vacuum cupping (at L side LS) and xfibers massage with jammie to LS and L side SIJ.     Home Exercises Provided and Patient Education Provided     Education provided:   - HEP importance  - RPE     Written Home Exercises Provided: " Patient instructed to cont prior HEP.  Exercises were reviewed and Lisa was able to demonstrate them prior to the end of the session.  Lisa demonstrated good  understanding of the education provided.     See EMR under Patient Instructions for exercises provided prior visit.      Assessment     Patient tolerated a 3 deg flexion ROM increase today (0-45 deg). He displayed a 35% IM strength increase from baseline upon midpoint re-assessment, with results at 12% below normative data today. He is progressing well with dynamic weight, displaying good tolerance with max tolerable reps. The plan is to progress patient with program in order to facilitate return to tennis and gym activities. Patient is making good progress towards established goals.  Pt will continue to benefit from skilled outpatient physical therapy to address the deficits stated in the impairment chart, provide pt/family education and to maximize pt's level of independence in the home and community environment.     Anticipated Barriers for therapy: None  Pt's spiritual, cultural and educational needs considered and pt agreeable to plan of care and goals as stated below:       GOALS: Pt is in agreement with the following goals.    Short term goals:  6 weeks or 10 visits   1.  Pt will demonstrate increased lumbar ROM by at least 3 degrees from the initial ROM value with improvements noted in functional ROM and ability to perform ADLs. MET 12/10/19  2.  Pt will demonstrate increased maximum isometric torque value by 10% when compared to the initial value resulting in improved ability to perform bending, lifting, and carrying activities safely, confidently.      3.  Patient report a reduction in worst pain score by 1-2 points for improved tolerance for functional activities.  Progressing  4.  Pt able to perform HEP correctly with minimal cueing or supervision from therapist to encourage independent management of symptoms.  MET 12/10/19        Long term  goals: 13 weeks or 20 visits   1. Pt will demonstrate increased lumbar ROM by at least 6 degrees from initial ROM value, resulting in improved ability to perform functional fwd bending while standing and sitting.  Progressing  2. Pt will demonstrate increased maximum isometric torque value by 20% when compared to the initial value resulting in improved ability to perform bending, lifting, and carrying activities safely, confidently. MET 12/10/19, continue to progress  3. Pt to demonstrate ability to independently control and reduce their pain through posture positioning and mechanical movements throughout a typical day.   Progressing  4.  Pt will demonstrate reduced pain and improved functional outcomes as reported on the FOTO by reaching a score of CJ = at least 20% but < 40% impaired, limited or restricted or less in order to demonstrate subjective improvement in pt's condition.   Progressing  5. Pt will demonstrate independence with the HEP at discharge  Progressing       Plan   Continue with established Plan of Care towards established PT goals.

## 2020-01-21 ENCOUNTER — CLINICAL SUPPORT (OUTPATIENT)
Dept: REHABILITATION | Facility: OTHER | Age: 35
End: 2020-01-21
Attending: FAMILY MEDICINE
Payer: COMMERCIAL

## 2020-01-21 DIAGNOSIS — R68.89 DECREASED FUNCTIONAL ACTIVITY TOLERANCE: ICD-10-CM

## 2020-01-21 DIAGNOSIS — R53.1 WEAKNESS: ICD-10-CM

## 2020-01-21 DIAGNOSIS — M53.86 DECREASED ROM OF LUMBAR SPINE: ICD-10-CM

## 2020-01-21 PROCEDURE — 97110 THERAPEUTIC EXERCISES: CPT

## 2020-01-21 NOTE — PROGRESS NOTES
[facility] Treatment/POC      Name: Lisa Garcia  Clinic Number: [id number]    Therapy Diagnosis:   Encounter Diagnoses   Name Primary?    Decreased ROM of lumbar spine     Decreased functional activity tolerance     Weakness      Physician: Phyllis Montana PA-C    Visit Date: 2020    Physician Orders: PT Eval and Treat      Medical Diagnosis from Referral:   M79.18 (ICD-10-CM) - Myofascial muscle pain  M54.14,M54.17 (ICD-10-CM) - Thoracic and lumbosacral neuritis  M54.41,G89.29 (ICD-10-CM) - Chronic bilateral low back pain with right-sided sciatica     Evaluation Date: 2019  Authorization Period Expiration: 19  Plan of Care Expiration: 19 (Recommend POC extension for another 6 weeks until 3/3/2020; to facilitate completion of program)  Reassessment Due: 2020  Visit # / Visits authorized:  (Last seen 12/10/19)     Time In: 1700  Time Out: 1800  Total Billable Time: 50 minutes     Precautions: Standard     Pattern of pain determined:       Subjective   Lisa returns to PT (last seen 12/10/19) and reports feeling stiff and a 5/10 LBP. He flew to [place], and the flight did not bother him, as he used a pillow at the lower back.     Patient reports tolerating previous visit well with minimal soreness     Patient reports their pain to be 5/10 on a 0-10 scale with 0 being no pain and 10 being the worst pain imaginable.  Pain Location: B hip, low back     Occupation: [employer], .     Leisure: Gym, Tennis     Objective     Baseline Isometric Testing on Med X equipment: Testing administered by PT  Date of testin19  ROM 0-42 deg   Max Peak Torque 246    Min Peak Torque 69    Flex/Ext Ratio 3.56   % below normative data 30         Baseline Isometric Testing on Med X equipment: Testing administered by PT  Date of testin19  ROM 0-45 deg   Max Peak Torque 261   Min Peak Torque 147   Flex/Ext Ratio 1.77   % below normative data 12      %  "IM strength gain from baseline: 35%  Total ROM gain from baseline: 3 deg flexion          Treatment    Pt was instructed in and performed the following:     Lisa received therapeutic exercises to develop/improved posture, cardiovascular endurance, muscular endurance, lumbar/cervical ROM, strength and muscular endurance for 50 minutes including the following exercises:     [facility] 1/21/2020   Visit Number 11   VAS Pain Rating 5   Treadmill Time (in min.) 10   Time -   Extension in Lying 10   Flexion in Lying 10   Manual Therapy -   Lumbar Extension Seat Pad -   Femur Restraint -   Top Dead Center -   Counterweight -   Lumbar Flexion -   Lumbar Extension -   Lumbar Peak Torque -   Min Torque -   Test Percent Below Normative Data -   Test Percent Gain in Strength from Initial  -   Lumbar Weight 82   Repetitions 20   Rating of Perceived Exertion 7   Ice - Z Lie (in min.) 10       DKTC 10x with 5" holds  Piriformis stretch 2x 20"  PPT x15 with 5" hlds  EIL  x10    Part of HEP:  Hip IR TFL/ITB stretch supine B  Prone Hip Ext    Prone rectus femoris stretch 2x20 sec (NT)  Bridges 10x  Clams GTB 10x  Prone hip ext   MET for correction of R ant innominate N/P    HEP for hips stretches:  (See patient instructions on 11/19/19)    Peripheral muscle strengthening which included 1 set of 15-20 repetitions at a slow, controlled 10-13 second per rep pace focused on strengthening supporting musculature for improved body mechanics and functional mobility.  Pt and therapist focused on proper form during treatment to ensure optimal strengthening of each targeted muscle group.  Machines were utilized including torso rotation, leg extension, leg curl, chest press, upright row. Tricep extension, bicep curl, leg press, and hip abduction added visit 3    Lisa received the following manual therapy techniques: 0 Minutes  STM/MFR, Vacuum cupping (at L side LS) and xfibers massage with jammie to LS and L side SIJ.     Home " Exercises Provided and Patient Education Provided     Education provided:   - HEP importance  - RPE     Written Home Exercises Provided: Patient instructed to cont prior HEP.  Exercises were reviewed and Lisa was able to demonstrate them prior to the end of the session.  Lisa demonstrated good  understanding of the education provided.     See EMR under Patient Instructions for exercises provided prior visit.      Assessment     Patient received a midpoint IM Lumbar extensor strength assessment, at last visit prior to going on vacation. He tolerated a 3 deg flexion ROM increase today (0-45 deg). He displayed a 35% IM strength increase from baseline upon midpoint re-assessment, with results at 12% below normative data today. He progressed well with dynamic weight, displaying good tolerance with max tolerable reps. The plan is to progress patient with program in order to facilitate return to tennis and gym activities. Patient is making good progress towards established goals. Upon re-assessment today, he required min cuing for stretches. He however, was able to complete 20 reps at 82# (per prior weight), but with a 7/10 RPE indicating difficulty. Patient will receive a 5% lumbar weight increase on Medx, at next visit.   Pt will continue to benefit from skilled outpatient physical therapy to address the deficits stated in the impairment chart, provide pt/family education and to maximize pt's level of independence in the home and community environment.     Anticipated Barriers for therapy: None  Pt's spiritual, cultural and educational needs considered and pt agreeable to plan of care and goals as stated below:       GOALS: Pt is in agreement with the following goals.    Short term goals:  6 weeks or 10 visits   1.  Pt will demonstrate increased lumbar ROM by at least 3 degrees from the initial ROM value with improvements noted in functional ROM and ability to perform ADLs. MET 12/10/19  2.  Pt will demonstrate  increased maximum isometric torque value by 10% when compared to the initial value resulting in improved ability to perform bending, lifting, and carrying activities safely, confidently.      3.  Patient report a reduction in worst pain score by 1-2 points for improved tolerance for functional activities.  Progressing  4.  Pt able to perform HEP correctly with minimal cueing or supervision from therapist to encourage independent management of symptoms.  MET 12/10/19        Long term goals: 13 weeks or 20 visits   1. Pt will demonstrate increased lumbar ROM by at least 6 degrees from initial ROM value, resulting in improved ability to perform functional fwd bending while standing and sitting.  Progressing  2. Pt will demonstrate increased maximum isometric torque value by 20% when compared to the initial value resulting in improved ability to perform bending, lifting, and carrying activities safely, confidently. MET 12/10/19, continue to progress  3. Pt to demonstrate ability to independently control and reduce their pain through posture positioning and mechanical movements throughout a typical day.   Progressing  4.  Pt will demonstrate reduced pain and improved functional outcomes as reported on the FOTO by reaching a score of CJ = at least 20% but < 40% impaired, limited or restricted or less in order to demonstrate subjective improvement in pt's condition.   Progressing  5. Pt will demonstrate independence with the HEP at discharge  Progressing       Plan   Continue with established Plan of Care towards established PT goals. Recommend POC extension for another 6 weeks until 3/3/2020; to facilitate completion of program)

## 2020-01-21 NOTE — PLAN OF CARE
[facility] Therapy Treatment/POC      Name: Lisa Garcia  Clinic Number: [id number]    Therapy Diagnosis:   Encounter Diagnoses   Name Primary?    Decreased ROM of lumbar spine     Decreased functional activity tolerance     Weakness      Physician: Phyllis Montana PA-C    Visit Date: 2020    Physician Orders: PT Eval and Treat      Medical Diagnosis from Referral:   M79.18 (ICD-10-CM) - Myofascial muscle pain  M54.14,M54.17 (ICD-10-CM) - Thoracic and lumbosacral neuritis  M54.41,G89.29 (ICD-10-CM) - Chronic bilateral low back pain with right-sided sciatica     Evaluation Date: 2019  Authorization Period Expiration: 19  Plan of Care Expiration: 19 (Recommend POC extension for another 6 weeks until 3/3/2020; to facilitate completion of program)  Reassessment Due: 2020  Visit # / Visits authorized:  (Last seen 12/10/19)     Time In: 1700  Time Out: 1800  Total Billable Time: 50 minutes     Precautions: Standard     Pattern of pain determined:       Subjective   Lisa returns to PT (last seen 12/10/19) and reports feeling stiff and a 5/10 LBP. He flew to [place], and the flight did not bother him, as he used a pillow at the lower back.     Patient reports tolerating previous visit well with minimal soreness     Patient reports their pain to be 5/10 on a 0-10 scale with 0 being no pain and 10 being the worst pain imaginable.  Pain Location: B hip, low back     Occupation: [employer], .     Leisure: Gym, Tennis     Objective     Baseline Isometric Testing on Med X equipment: Testing administered by PT  Date of testin19  ROM 0-42 deg   Max Peak Torque 246    Min Peak Torque 69    Flex/Ext Ratio 3.56   % below normative data 30         Baseline Isometric Testing on Med X equipment: Testing administered by PT  Date of testin19  ROM 0-45 deg   Max Peak Torque 261   Min Peak Torque 147   Flex/Ext Ratio 1.77   % below normative data 12      %  "IM strength gain from baseline: 35%  Total ROM gain from baseline: 3 deg flexion          Treatment    Pt was instructed in and performed the following:     Lisa received therapeutic exercises to develop/improved posture, cardiovascular endurance, muscular endurance, lumbar/cervical ROM, strength and muscular endurance for 50 minutes including the following exercises:     [facility] 1/21/2020   Visit Number 11   VAS Pain Rating 5   Treadmill Time (in min.) 10   Time -   Extension in Lying 10   Flexion in Lying 10   Manual Therapy -   Lumbar Extension Seat Pad -   Femur Restraint -   Top Dead Center -   Counterweight -   Lumbar Flexion -   Lumbar Extension -   Lumbar Peak Torque -   Min Torque -   Test Percent Below Normative Data -   Test Percent Gain in Strength from Initial  -   Lumbar Weight 82   Repetitions 20   Rating of Perceived Exertion 7   Ice - Z Lie (in min.) 10       DKTC 10x with 5" holds  Piriformis stretch 2x 20"  PPT x15 with 5" hlds  EIL  x10    Part of HEP:  Hip IR TFL/ITB stretch supine B  Prone Hip Ext    Prone rectus femoris stretch 2x20 sec (NT)  Bridges 10x  Clams GTB 10x  Prone hip ext   MET for correction of R ant innominate N/P    HEP for hips stretches:  (See patient instructions on 11/19/19)    Peripheral muscle strengthening which included 1 set of 15-20 repetitions at a slow, controlled 10-13 second per rep pace focused on strengthening supporting musculature for improved body mechanics and functional mobility.  Pt and therapist focused on proper form during treatment to ensure optimal strengthening of each targeted muscle group.  Machines were utilized including torso rotation, leg extension, leg curl, chest press, upright row. Tricep extension, bicep curl, leg press, and hip abduction added visit 3    Lisa received the following manual therapy techniques: 0 Minutes  STM/MFR, Vacuum cupping (at L side LS) and xfibers massage with jammie to LS and L side SIJ.     Home " Exercises Provided and Patient Education Provided     Education provided:   - HEP importance  - RPE     Written Home Exercises Provided: Patient instructed to cont prior HEP.  Exercises were reviewed and Lisa was able to demonstrate them prior to the end of the session.  Lisa demonstrated good  understanding of the education provided.     See EMR under Patient Instructions for exercises provided prior visit.      Assessment     Patient received a midpoint IM Lumbar extensor strength assessment, at last visit prior to going on vacation. He tolerated a 3 deg flexion ROM increase today (0-45 deg). He displayed a 35% IM strength increase from baseline upon midpoint re-assessment, with results at 12% below normative data today. He progressed well with dynamic weight, displaying good tolerance with max tolerable reps. The plan is to progress patient with program in order to facilitate return to tennis and gym activities. Patient is making good progress towards established goals. Upon re-assessment today, he required min cuing for stretches. He however, was able to complete 20 reps at 82# (per prior weight), but with a 7/10 RPE indicating difficulty. Patient will receive a 5% lumbar weight increase on Medx, at next visit.   Pt will continue to benefit from skilled outpatient physical therapy to address the deficits stated in the impairment chart, provide pt/family education and to maximize pt's level of independence in the home and community environment.     Anticipated Barriers for therapy: None  Pt's spiritual, cultural and educational needs considered and pt agreeable to plan of care and goals as stated below:       GOALS: Pt is in agreement with the following goals.    Short term goals:  6 weeks or 10 visits   1.  Pt will demonstrate increased lumbar ROM by at least 3 degrees from the initial ROM value with improvements noted in functional ROM and ability to perform ADLs. MET 12/10/19  2.  Pt will demonstrate  increased maximum isometric torque value by 10% when compared to the initial value resulting in improved ability to perform bending, lifting, and carrying activities safely, confidently.      3.  Patient report a reduction in worst pain score by 1-2 points for improved tolerance for functional activities.  Progressing  4.  Pt able to perform HEP correctly with minimal cueing or supervision from therapist to encourage independent management of symptoms.  MET 12/10/19        Long term goals: 13 weeks or 20 visits   1. Pt will demonstrate increased lumbar ROM by at least 6 degrees from initial ROM value, resulting in improved ability to perform functional fwd bending while standing and sitting.  Progressing  2. Pt will demonstrate increased maximum isometric torque value by 20% when compared to the initial value resulting in improved ability to perform bending, lifting, and carrying activities safely, confidently. MET 12/10/19, continue to progress  3. Pt to demonstrate ability to independently control and reduce their pain through posture positioning and mechanical movements throughout a typical day.   Progressing  4.  Pt will demonstrate reduced pain and improved functional outcomes as reported on the FOTO by reaching a score of CJ = at least 20% but < 40% impaired, limited or restricted or less in order to demonstrate subjective improvement in pt's condition.   Progressing  5. Pt will demonstrate independence with the HEP at discharge  Progressing       Plan   Continue with established Plan of Care towards established PT goals. Recommend POC extension for another 6 weeks until 3/3/2020; to facilitate completion of program)

## 2020-01-29 ENCOUNTER — CLINICAL SUPPORT (OUTPATIENT)
Dept: REHABILITATION | Facility: OTHER | Age: 35
End: 2020-01-29
Attending: FAMILY MEDICINE
Payer: COMMERCIAL

## 2020-01-29 DIAGNOSIS — M53.86 DECREASED ROM OF LUMBAR SPINE: ICD-10-CM

## 2020-01-29 DIAGNOSIS — R68.89 DECREASED FUNCTIONAL ACTIVITY TOLERANCE: ICD-10-CM

## 2020-01-29 DIAGNOSIS — R53.1 WEAKNESS: ICD-10-CM

## 2020-01-29 PROCEDURE — 97110 THERAPEUTIC EXERCISES: CPT

## 2020-01-29 NOTE — PROGRESS NOTES
"[facility] Physical Therapy Treatment/POC      Name: Lisa Garcia  Clinic Number: [id number]    Therapy Diagnosis:   Encounter Diagnoses   Name Primary?    Decreased ROM of lumbar spine     Decreased functional activity tolerance     Weakness      Physician: Phyllis Montana PA-C    Visit Date: 2020    Physician Orders: PT Eval and Treat      Medical Diagnosis from Referral:   M79.18 (ICD-10-CM) - Myofascial muscle pain  M54.14,M54.17 (ICD-10-CM) - Thoracic and lumbosacral neuritis  M54.41,G89.29 (ICD-10-CM) - Chronic bilateral low back pain with right-sided sciatica     Evaluation Date: 2019  Authorization Period Expiration: 2021  Plan of Care Expiration:  3/3/2020  Reassessment Due: 2020  Visit # / Visits authorized:      Time In: 1700  Time Out: 1800  Total Billable Time: 50 minutes     Precautions: Standard     Pattern of pain determined:       Subjective   Lisa reports no pain at this time, "feeling good"    Patient reports tolerating previous visit well with minimal soreness     Patient reports their pain to be 3/10 on a 0-10 scale with 0 being no pain and 10 being the worst pain imaginable.  Pain Location: B hip, low back     Occupation: [employer], .     Leisure: Gym, Tennis     Objective     Baseline Isometric Testing on Med X equipment: Testing administered by PT  Date of testin19  ROM 0-42 deg   Max Peak Torque 246    Min Peak Torque 69    Flex/Ext Ratio 3.56   % below normative data 30         Baseline Isometric Testing on Med X equipment: Testing administered by PT  Date of testin19  ROM 0-45 deg   Max Peak Torque 261   Min Peak Torque 147   Flex/Ext Ratio 1.77   % below normative data 12      % IM strength gain from baseline: 35%  Total ROM gain from baseline: 3 deg flexion          Treatment    Pt was instructed in and performed the following:     Lisa received therapeutic exercises to develop/improved posture, cardiovascular " "endurance, muscular endurance, lumbar/cervical ROM, strength and muscular endurance for 50 minutes including the following exercises:       [facility] 1/29/2020   Visit Number 12   VAS Pain Rating 3   Treadmill Time (in min.) 7   Time -   Extension in Lying 10   Flexion in Lying 10   Lumbar Weight 84   Repetitions 15   Rating of Perceived Exertion 3.5   Ice - Z Lie (in min.) 10         Double knee to chest  10x with 5" holds  Piriformis stretch 2x 20"  Posterior pelvic tilt  x5 with 5" hlds  Extension in Lying   x10  Bridges 10x  Clams GTB 10x  Prone hip ext     Part of HEP:  Hip IR TFL/ITB stretch supine B  Prone Hip Ext  Prone rectus femoris stretch 2x20 sec (NT)  MET for correction of R ant innominate N/P    HEP for hips stretches:  (See patient instructions on 11/19/19)    Peripheral muscle strengthening which included 1 set of 15-20 repetitions at a slow, controlled 10-13 second per rep pace focused on strengthening supporting musculature for improved body mechanics and functional mobility.  Pt and therapist focused on proper form during treatment to ensure optimal strengthening of each targeted muscle group.  Machines were utilized including torso rotation, leg extension, leg curl, chest press, upright row. Tricep extension, bicep curl, leg press, and hip abduction added visit 3    Lisa received the following manual therapy techniques: 0 Minutes  STM/MFR, Vacuum cupping (at L side LS) and xfibers massage with jammie to LS and L side SIJ.     Home Exercises Provided and Patient Education Provided     Education provided:   - HEP importance  - RPE     Written Home Exercises Provided: Patient instructed to cont prior HEP.  Exercises were reviewed and Lisa was able to demonstrate them prior to the end of the session.  Lisa demonstrated good  understanding of the education provided.     See EMR under Patient Instructions for exercises provided prior visit.      Assessment     Patient was able to complete " therapy this visit with minimal discomfort in the low back that did not progress post therapy. Resumed exercises from the previous visit. Increased resistance slightly on the lumbar medx and he was able to complete 15 repetitions but did not want to go past that, plan to continually progress next visit.     Patient is making good progress towards established goals.  Pt will continue to benefit from skilled outpatient physical therapy to address the deficits stated in the impairment chart, provide pt/family education and to maximize pt's level of independence in the home and community environment.     Anticipated Barriers for therapy: None  Pt's spiritual, cultural and educational needs considered and pt agreeable to plan of care and goals as stated below:       GOALS: Pt is in agreement with the following goals.    Short term goals:  6 weeks or 10 visits   1.  Pt will demonstrate increased lumbar ROM by at least 3 degrees from the initial ROM value with improvements noted in functional ROM and ability to perform ADLs. MET 12/10/19  2.  Pt will demonstrate increased maximum isometric torque value by 10% when compared to the initial value resulting in improved ability to perform bending, lifting, and carrying activities safely, confidently.   3.  Patient report a reduction in worst pain score by 1-2 points for improved tolerance for functional activities.  Progressing  4.  Pt able to perform HEP correctly with minimal cueing or supervision from therapist to encourage independent management of symptoms.  MET 12/10/19        Long term goals: 13 weeks or 20 visits   1. Pt will demonstrate increased lumbar ROM by at least 6 degrees from initial ROM value, resulting in improved ability to perform functional fwd bending while standing and sitting.  Progressing  2. Pt will demonstrate increased maximum isometric torque value by 20% when compared to the initial value resulting in improved ability to perform bending, lifting,  and carrying activities safely, confidently. MET 12/10/19, continue to progress  3. Pt to demonstrate ability to independently control and reduce their pain through posture positioning and mechanical movements throughout a typical day.   Progressing  4.  Pt will demonstrate reduced pain and improved functional outcomes as reported on the FOTO by reaching a score of CJ = at least 20% but < 40% impaired, limited or restricted or less in order to demonstrate subjective improvement in pt's condition.   Progressing  5. Pt will demonstrate independence with the HEP at discharge  Progressing       Plan   Continue with established Plan of Care towards established PT goals. Recommend POC extension for another 6 weeks until 3/3/2020; to facilitate completion of program)

## 2020-02-05 ENCOUNTER — CLINICAL SUPPORT (OUTPATIENT)
Dept: REHABILITATION | Facility: OTHER | Age: 35
End: 2020-02-05
Attending: FAMILY MEDICINE
Payer: COMMERCIAL

## 2020-02-05 DIAGNOSIS — M25.60 DECREASED MOBILITY OF JOINT: ICD-10-CM

## 2020-02-05 DIAGNOSIS — G89.29 CHRONIC PAIN OF RIGHT ANKLE: ICD-10-CM

## 2020-02-05 DIAGNOSIS — M25.571 CHRONIC PAIN OF RIGHT ANKLE: ICD-10-CM

## 2020-02-05 DIAGNOSIS — R53.1 DECREASED RANGE OF MOTION WITH DECREASED STRENGTH: ICD-10-CM

## 2020-02-05 DIAGNOSIS — M25.60 DECREASED RANGE OF MOTION WITH DECREASED STRENGTH: ICD-10-CM

## 2020-02-05 PROCEDURE — 97110 THERAPEUTIC EXERCISES: CPT

## 2020-02-05 NOTE — PROGRESS NOTES
[facility] Physical Therapy Treatment/POC      Name: Lisa Garcia  Clinic Number: [id number]    Therapy Diagnosis:   Encounter Diagnoses   Name Primary?    Chronic pain of right ankle     Decreased range of motion with decreased strength     Decreased mobility of joint      Physician: Phyllis Montana PA-C    Visit Date: 2020    Physician Orders: PT Eval and Treat      Medical Diagnosis from Referral:   M79.18 (ICD-10-CM) - Myofascial muscle pain  M54.14,M54.17 (ICD-10-CM) - Thoracic and lumbosacral neuritis  M54.41,G89.29 (ICD-10-CM) - Chronic bilateral low back pain with right-sided sciatica     Evaluation Date: 2019  Authorization Period Expiration: 2021  Plan of Care Expiration:  3/3/2020  Reassessment Due: 2020  Visit # / Visits authorized:      Time In: 500  Time Out: 600  Total Billable Time: 50 minutes     Precautions: Standard     Pattern of pain determined:       Subjective   Lisa reports mild soreness today following orange theory completion yesterday     Patient reports tolerating previous visit well with minimal soreness     Patient reports their pain to be 3/10 on a 0-10 scale with 0 being no pain and 10 being the worst pain imaginable.  Pain Location: B hip, low back     Occupation: [employer], .     Leisure: Gym, Tennis     Objective     Baseline Isometric Testing on Med X equipment: Testing administered by PT  Date of testin19  ROM 0-42 deg   Max Peak Torque 246    Min Peak Torque 69    Flex/Ext Ratio 3.56   % below normative data 30         Baseline Isometric Testing on Med X equipment: Testing administered by PT  Date of testin19  ROM 0-45 deg   Max Peak Torque 261   Min Peak Torque 147   Flex/Ext Ratio 1.77   % below normative data 12      % IM strength gain from baseline: 35%  Total ROM gain from baseline: 3 deg flexion          Treatment    Pt was instructed in and performed the following:     Lisa received therapeutic  "exercises to develop/improved posture, cardiovascular endurance, muscular endurance, lumbar/cervical ROM, strength and muscular endurance for 50 minutes including the following exercises:   [facility] 2/5/2020   Visit Number 13   VAS Pain Rating 2   Treadmill Time (in min.) 10   Time -   Extension in Lying 10   Flexion in Lying 10   Manual Therapy -   Lumbar Extension Seat Pad -   Femur Restraint -   Top Dead Center -   Counterweight -   Lumbar Flexion -   Lumbar Extension -   Lumbar Peak Torque -   Min Torque -   Test Percent Below Normative Data -   Test Percent Gain in Strength from Initial  -   Lumbar Weight 84   Repetitions 18   Rating of Perceived Exertion 6   Ice - Z Lie (in min.) 10       Double knee to chest  10x with 5" holds  Piriformis stretch 2x 20"  Posterior pelvic tilt  x5 with 5" hlds  Extension in Lying   x10  Bridges 10x  Clams GTB 10x  Prone hip ext     Part of HEP:  Hip IR TFL/ITB stretch supine B  Prone Hip Ext  Prone rectus femoris stretch 2x20 sec (NT)  MET for correction of R ant innominate N/P    HEP for hips stretches:  (See patient instructions on 11/19/19)    Peripheral muscle strengthening which included 1 set of 15-20 repetitions at a slow, controlled 10-13 second per rep pace focused on strengthening supporting musculature for improved body mechanics and functional mobility.  Pt and therapist focused on proper form during treatment to ensure optimal strengthening of each targeted muscle group.  Machines were utilized including torso rotation, leg extension, leg curl, chest press, upright row. Tricep extension, bicep curl, leg press, and hip abduction added visit 3    Lisa received the following manual therapy techniques: 0 Minutes  STM/MFR, Vacuum cupping (at L side LS) and xfibers massage with jammie to LS and L side SIJ.     Home Exercises Provided and Patient Education Provided     Education provided:   - HEP importance  - RPE     Written Home Exercises Provided: Patient " instructed to cont prior HEP.  Exercises were reviewed and Lisa was able to demonstrate them prior to the end of the session.  Lisa demonstrated good  understanding of the education provided.     See EMR under Patient Instructions for exercises provided prior visit.      Assessment     Pt able to complete all components of session with no adverse effects. Increased repetitions on medx with appropriate RPE. Will continue to progress as tolerated.    Patient is making good progress towards established goals.  Pt will continue to benefit from skilled outpatient physical therapy to address the deficits stated in the impairment chart, provide pt/family education and to maximize pt's level of independence in the home and community environment.     Anticipated Barriers for therapy: None  Pt's spiritual, cultural and educational needs considered and pt agreeable to plan of care and goals as stated below:       GOALS: Pt is in agreement with the following goals.    Short term goals:  6 weeks or 10 visits   1.  Pt will demonstrate increased lumbar ROM by at least 3 degrees from the initial ROM value with improvements noted in functional ROM and ability to perform ADLs. MET 12/10/19  2.  Pt will demonstrate increased maximum isometric torque value by 10% when compared to the initial value resulting in improved ability to perform bending, lifting, and carrying activities safely, confidently.   3.  Patient report a reduction in worst pain score by 1-2 points for improved tolerance for functional activities.  Progressing  4.  Pt able to perform HEP correctly with minimal cueing or supervision from therapist to encourage independent management of symptoms.  MET 12/10/19        Long term goals: 13 weeks or 20 visits   1. Pt will demonstrate increased lumbar ROM by at least 6 degrees from initial ROM value, resulting in improved ability to perform functional fwd bending while standing and sitting.  Progressing  2. Pt will  demonstrate increased maximum isometric torque value by 20% when compared to the initial value resulting in improved ability to perform bending, lifting, and carrying activities safely, confidently. MET 12/10/19, continue to progress  3. Pt to demonstrate ability to independently control and reduce their pain through posture positioning and mechanical movements throughout a typical day.   Progressing  4.  Pt will demonstrate reduced pain and improved functional outcomes as reported on the FOTO by reaching a score of CJ = at least 20% but < 40% impaired, limited or restricted or less in order to demonstrate subjective improvement in pt's condition.   Progressing  5. Pt will demonstrate independence with the HEP at discharge  Progressing       Plan   Continue with established Plan of Care towards established PT goals. Recommend POC extension for another 6 weeks until 3/3/2020; to facilitate completion of program)

## 2020-03-04 ENCOUNTER — CLINICAL SUPPORT (OUTPATIENT)
Dept: REHABILITATION | Facility: OTHER | Age: 35
End: 2020-03-04
Attending: FAMILY MEDICINE
Payer: COMMERCIAL

## 2020-03-04 DIAGNOSIS — R68.89 DECREASED FUNCTIONAL ACTIVITY TOLERANCE: ICD-10-CM

## 2020-03-04 DIAGNOSIS — M53.86 DECREASED ROM OF LUMBAR SPINE: ICD-10-CM

## 2020-03-04 DIAGNOSIS — R53.1 WEAKNESS: ICD-10-CM

## 2020-03-04 PROCEDURE — 97110 THERAPEUTIC EXERCISES: CPT

## 2020-03-04 NOTE — PROGRESS NOTES
[facility] Physical Therapy Treatment/POC      Name: Lisa Garcia  [employer] Number: [id number]    Therapy Diagnosis:   Encounter Diagnoses   Name Primary?    Decreased ROM of lumbar spine     Decreased functional activity tolerance     Weakness      Physician: Phyllis Montana PA-C    Visit Date: 3/4/2020    Physician Orders: PT Eval and Treat      Medical Diagnosis from Referral:   M79.18 (ICD-10-CM) - Myofascial muscle pain  M54.14,M54.17 (ICD-10-CM) - Thoracic and lumbosacral neuritis  M54.41,G89.29 (ICD-10-CM) - Chronic bilateral low back pain with right-sided sciatica     Evaluation Date: 2019  Authorization Period Expiration: 2021  Plan of Care Expiration:  3/3/2020  Reassessment Due: 2020  Visit # / Visits authorized:      Time In: 500  Time Out: 550  Total Billable Time: 50 minutes     Precautions: Standard     Pattern of pain determined:       Subjective   Lisa reports he is feeling good right now, about 2-3. Has not been stretching. Has been caught up with the holiday and traveling and sleeping on bad mattresses     Patient reports tolerating previous visit well with minimal soreness     Patient reports their pain to be 3/10 on a 0-10 scale with 0 being no pain and 10 being the worst pain imaginable.  Pain Location: B hip, low back     Occupation: [employer], .     Leisure: Gym, Tennis     Objective     Baseline Isometric Testing on Med X equipment: Testing administered by PT  Date of testin19  ROM 0-42 deg   Max Peak Torque 246    Min Peak Torque 69    Flex/Ext Ratio 3.56   % below normative data 30         Baseline Isometric Testing on Med X equipment: Testing administered by PT  Date of testin19  ROM 0-45 deg   Max Peak Torque 261   Min Peak Torque 147   Flex/Ext Ratio 1.77   % below normative data 12      % IM strength gain from baseline: 35%  Total ROM gain from baseline: 3 deg flexion          Treatment    Pt was instructed in and  "performed the following:     Lisa received therapeutic exercises to develop/improved posture, cardiovascular endurance, muscular endurance, lumbar/cervical ROM, strength and muscular endurance for 50 minutes including the following exercises:     [facility] 3/4/2020   Visit Number 14   VAS Pain Rating 2.5   Treadmill Time (in min.) 5   Time -   Extension in Lying 10   Flexion in Lying 10   Manual Therapy -   Test Percent Gain in Strength from Initial  -   Lumbar Weight 84   Repetitions 18   Rating of Perceived Exertion 4   Ice - Z Lie (in min.) 10         Double knee to chest  10x with 5" holds  Piriformis stretch 2x 20"  Posterior pelvic tilt  x5 with 5" hlds  Extension in Lying   x10  Bridges 10x  Clams GTB 10x  Prone hip ext     Part of HEP:  Hip IR TFL/ITB stretch supine B  Prone Hip Ext  Prone rectus femoris stretch 2x20 sec (NT)  MET for correction of R ant innominate N/P    HEP for hips stretches:  (See patient instructions on 11/19/19)    Peripheral muscle strengthening which included 1 set of 15-20 repetitions at a slow, controlled 10-13 second per rep pace focused on strengthening supporting musculature for improved body mechanics and functional mobility.  Pt and therapist focused on proper form during treatment to ensure optimal strengthening of each targeted muscle group.  Machines were utilized including torso rotation, leg extension, leg curl, chest press, upright row. Tricep extension, bicep curl, leg press, and hip abduction added visit 3    Lisa received the following manual therapy techniques: 0 Minutes  STM/MFR, Vacuum cupping (at L side LS) and xfibers massage with jammie to LS and L side SIJ.     Home Exercises Provided and Patient Education Provided     Education provided:   - HEP importance  - RPE     Written Home Exercises Provided: Patient instructed to cont prior HEP.  Exercises were reviewed and Lisa was able to demonstrate them prior to the end of the session.  Lisa " demonstrated good  understanding of the education provided.     See EMR under Patient Instructions for exercises provided prior visit.      Assessment     Pt able to complete all components of session with no adverse effects. PT had not been to therapy for several weeks, did not add exercises and did not increase weights or repetitions. Will continue to progress as tolerated.    Patient is making good progress towards established goals.  Pt will continue to benefit from skilled outpatient physical therapy to address the deficits stated in the impairment chart, provide pt/family education and to maximize pt's level of independence in the home and community environment.     Anticipated Barriers for therapy: None  Pt's spiritual, cultural and educational needs considered and pt agreeable to plan of care and goals as stated below:       GOALS: Pt is in agreement with the following goals.    Short term goals:  6 weeks or 10 visits   1.  Pt will demonstrate increased lumbar ROM by at least 3 degrees from the initial ROM value with improvements noted in functional ROM and ability to perform ADLs. MET 12/10/19  2.  Pt will demonstrate increased maximum isometric torque value by 10% when compared to the initial value resulting in improved ability to perform bending, lifting, and carrying activities safely, confidently.   3.  Patient report a reduction in worst pain score by 1-2 points for improved tolerance for functional activities.  Progressing  4.  Pt able to perform HEP correctly with minimal cueing or supervision from therapist to encourage independent management of symptoms.  MET 12/10/19        Long term goals: 13 weeks or 20 visits   1. Pt will demonstrate increased lumbar ROM by at least 6 degrees from initial ROM value, resulting in improved ability to perform functional fwd bending while standing and sitting.  Progressing  2. Pt will demonstrate increased maximum isometric torque value by 20% when compared to the  initial value resulting in improved ability to perform bending, lifting, and carrying activities safely, confidently. MET 12/10/19, continue to progress  3. Pt to demonstrate ability to independently control and reduce their pain through posture positioning and mechanical movements throughout a typical day.   Progressing  4.  Pt will demonstrate reduced pain and improved functional outcomes as reported on the FOTO by reaching a score of CJ = at least 20% but < 40% impaired, limited or restricted or less in order to demonstrate subjective improvement in pt's condition.   Progressing  5. Pt will demonstrate independence with the HEP at discharge  Progressing       Plan   Continue with established Plan of Care towards established PT goals. Recommend POC extension for another 6 weeks until 3/3/2020; to facilitate completion of program)

## 2020-03-10 ENCOUNTER — CLINICAL SUPPORT (OUTPATIENT)
Dept: REHABILITATION | Facility: OTHER | Age: 35
End: 2020-03-10
Attending: FAMILY MEDICINE
Payer: COMMERCIAL

## 2020-03-10 DIAGNOSIS — M53.86 DECREASED ROM OF LUMBAR SPINE: ICD-10-CM

## 2020-03-10 DIAGNOSIS — R53.1 WEAKNESS: ICD-10-CM

## 2020-03-10 DIAGNOSIS — R68.89 DECREASED FUNCTIONAL ACTIVITY TOLERANCE: ICD-10-CM

## 2020-03-10 PROCEDURE — 97110 THERAPEUTIC EXERCISES: CPT

## 2020-03-10 NOTE — PROGRESS NOTES
[facility] Treatment/POC      Name: Lisa Garcia  Clinic Number: [id number]    Therapy Diagnosis:   Encounter Diagnoses   Name Primary?    Decreased ROM of lumbar spine     Decreased functional activity tolerance     Weakness      Physician: Phyllis Montana PA-C    Visit Date: 3/10/2020    Physician Orders: PT Eval and Treat      Medical Diagnosis from Referral:   M79.18 (ICD-10-CM) - Myofascial muscle pain  M54.14,M54.17 (ICD-10-CM) - Thoracic and lumbosacral neuritis  M54.41,G89.29 (ICD-10-CM) - Chronic bilateral low back pain with right-sided sciatica     Evaluation Date: 2019  Authorization Period Expiration: 2021  Plan of Care Expiration:4/10/20  Reassessment Due: 3/21/20  Visit # / Visits authorized: 15/ 20     Time In:450  Time Out: 550  Total Billable Time: 50 minutes     Precautions: Standard     Pattern of pain determined:       Subjective   Lisa reports his hips are starting to bother him more    Patient reports tolerating previous visit well with minimal soreness     Patient reports their pain to be 3/10 on a 0-10 scale with 0 being no pain and 10 being the worst pain imaginable.  Pain Location: B hip, low back     Occupation: [employer], .     Leisure: Gym, Tennis     Objective     Baseline Isometric Testing on Med X equipment: Testing administered by PT  Date of testin19  ROM 0-42 deg   Max Peak Torque 246    Min Peak Torque 69    Flex/Ext Ratio 3.56   % below normative data 30         Baseline Isometric Testing on Med X equipment: Testing administered by PT  Date of testin19  ROM 0-45 deg   Max Peak Torque 261   Min Peak Torque 147   Flex/Ext Ratio 1.77   % below normative data 12      % IM strength gain from baseline: 35%  Total ROM gain from baseline: 3 deg flexion          Treatment    Pt was instructed in and performed the following:     Lisa received therapeutic exercises to develop/improved posture, cardiovascular endurance,  "muscular endurance, lumbar/cervical ROM, strength and muscular endurance for 60 minutes including the following exercises:   [facility] 3/10/2020   Visit Number 15   VAS Pain Rating 3   Treadmill Time (in min.) 10   Time -   Extension in Lying 10   Flexion in Lying 10   Manual Therapy -   Lumbar Extension Seat Pad -   Femur Restraint -   Top Dead Center -   Counterweight -   Lumbar Flexion -   Lumbar Extension -   Lumbar Peak Torque -   Min Torque -   Test Percent Below Normative Data -   Test Percent Gain in Strength from Initial  -   Lumbar Weight 84   Repetitions 16   Rating of Perceived Exertion 7   Ice - Z Lie (in min.) 10         Double knee to chest  10x with 5" holds  Piriformis stretch 2x 20"  Posterior pelvic tilt  x5 with 5" hlds  Extension in Lying   x10  Bridges 10x  Clams GTB 10x  Prone hip ext     Part of HEP:  Hip IR TFL/ITB stretch supine B  Prone Hip Ext  Prone rectus femoris stretch 2x20 sec (NT)  MET for correction of R ant innominate N/P    HEP for hips stretches:  (See patient instructions on 11/19/19)    Peripheral muscle strengthening which included 1 set of 15-20 repetitions at a slow, controlled 10-13 second per rep pace focused on strengthening supporting musculature for improved body mechanics and functional mobility.  Pt and therapist focused on proper form during treatment to ensure optimal strengthening of each targeted muscle group.  Machines were utilized including torso rotation, leg extension, leg curl, chest press, upright row. Tricep extension, bicep curl, leg press, and hip abduction added visit 3    Lisa received the following manual therapy techniques: 0 Minutes  STM/MFR, Vacuum cupping (at L side LS) and xfibers massage with jammie to LS and L side SIJ.     Home Exercises Provided and Patient Education Provided     Education provided:   - HEP importance  - RPE     Written Home Exercises Provided: Patient instructed to cont prior HEP.  Exercises were reviewed and " Lisa was able to demonstrate them prior to the end of the session.  Lisa demonstrated good  understanding of the education provided.     See EMR under Patient Instructions for exercises provided prior visit.      Assessment     Pt able to complete all components of session with no adverse effects. Reviewed stretches for hips ROM and instructed pt to perform daily.  Pt able to tolerate 16 reps with 7/10 exertion level today. Educated pt to be more consistent with treatment and improvements will be tangible. Will continue to progress as tolerated.    Patient is making good progress towards established goals.  Pt will continue to benefit from skilled outpatient physical therapy to address the deficits stated in the impairment chart, provide pt/family education and to maximize pt's level of independence in the home and community environment.     Anticipated Barriers for therapy: None  Pt's spiritual, cultural and educational needs considered and pt agreeable to plan of care and goals as stated below:       GOALS: Pt is in agreement with the following goals.    Short term goals:  6 weeks or 10 visits   1.  Pt will demonstrate increased lumbar ROM by at least 3 degrees from the initial ROM value with improvements noted in functional ROM and ability to perform ADLs. MET 12/10/19  2.  Pt will demonstrate increased maximum isometric torque value by 10% when compared to the initial value resulting in improved ability to perform bending, lifting, and carrying activities safely, confidently.   3.  Patient report a reduction in worst pain score by 1-2 points for improved tolerance for functional activities.  Progressing  4.  Pt able to perform HEP correctly with minimal cueing or supervision from therapist to encourage independent management of symptoms.  MET 12/10/19        Long term goals: 13 weeks or 20 visits   1. Pt will demonstrate increased lumbar ROM by at least 6 degrees from initial ROM value, resulting in improved  ability to perform functional fwd bending while standing and sitting.  Progressing  2. Pt will demonstrate increased maximum isometric torque value by 20% when compared to the initial value resulting in improved ability to perform bending, lifting, and carrying activities safely, confidently. MET 12/10/19, continue to progress  3. Pt to demonstrate ability to independently control and reduce their pain through posture positioning and mechanical movements throughout a typical day.   Progressing  4.  Pt will demonstrate reduced pain and improved functional outcomes as reported on the FOTO by reaching a score of CJ = at least 20% but < 40% impaired, limited or restricted or less in order to demonstrate subjective improvement in pt's condition.   Progressing  5. Pt will demonstrate independence with the HEP at discharge  Progressing       Plan   Continue with established Plan of Care towards established PT goals. Recommend POC extension for another 6 weeks until 3/3/2020; to facilitate completion of program)

## 2020-03-17 ENCOUNTER — TELEPHONE (OUTPATIENT)
Dept: REHABILITATION | Facility: OTHER | Age: 35
End: 2020-03-17

## 2020-03-17 NOTE — TELEPHONE ENCOUNTER
Patient was called and informed that [facility] services are being offered at alternate locations at this time due to the covid 19 situation in the abundance of caution.   Patient  was offered treatment at another location but opted to wait until we resume operations at [facility] at this time.

## 2020-03-30 ENCOUNTER — DOCUMENTATION ONLY (OUTPATIENT)
Dept: REHABILITATION | Facility: OTHER | Age: 35
End: 2020-03-30

## 2020-03-30 NOTE — PROGRESS NOTES
PT called patient and left a message with his email address for any question. PT mentioned list of current exercises in voicemail, to help with recall. PT will contact patient again for  More direct communication.

## 2020-04-08 ENCOUNTER — DOCUMENTATION ONLY (OUTPATIENT)
Dept: REHABILITATION | Facility: OTHER | Age: 35
End: 2020-04-08

## 2020-04-08 NOTE — PROGRESS NOTES
Attempted to reach patient in regards to availability of virtual visits for current diagnosis.  Pt did not answer the phone, no messaging system available.    Jessica Austin

## 2020-04-08 NOTE — PROGRESS NOTES
PT called patient and spoke briefly, as patient ws in a conference call. Will attempt back again.

## 2020-04-28 ENCOUNTER — TELEPHONE (OUTPATIENT)
Dept: ORTHOPEDICS | Facility: CLINIC | Age: 35
End: 2020-04-28

## 2020-04-28 NOTE — TELEPHONE ENCOUNTER
----- Message from Maggie Dumont sent at 4/28/2020  8:32 AM CDT -----  Contact: [phone]  Pt is requesting an in clinic hand appointment for lump on hand and think he need xrays. Can pt be seen soon for this matter. Please call patient and advise thanks

## 2020-04-28 NOTE — TELEPHONE ENCOUNTER
"Spoke c pt. He reports a "lump" on L hand that appeared yesterday. No tenderness reported. Advised that the [facility] is only seeing urgent/emergent in-person pts at this time. Scheduled pt 06/02/20. Advised that he contact our office should the area becomes red, increased swelling/pain. Pt expressed understanding & was thankful.     "

## 2020-05-20 ENCOUNTER — OFFICE VISIT (OUTPATIENT)
Dept: SPORTS MEDICINE | Facility: CLINIC | Age: 35
End: 2020-05-20
Payer: COMMERCIAL

## 2020-05-20 ENCOUNTER — HOSPITAL ENCOUNTER (OUTPATIENT)
Dept: RADIOLOGY | Facility: HOSPITAL | Age: 35
Discharge: HOME | End: 2020-05-20
Attending: FAMILY MEDICINE
Payer: COMMERCIAL

## 2020-05-20 VITALS — BODY MASS INDEX: 31.44 KG/M2 | HEIGHT: 74 IN | TEMPERATURE: 98 F | WEIGHT: 245 LBS

## 2020-05-20 DIAGNOSIS — R26.9 GAIT ABNORMALITY: ICD-10-CM

## 2020-05-20 DIAGNOSIS — M70.61 GREATER TROCHANTERIC BURSITIS OF BOTH HIPS: ICD-10-CM

## 2020-05-20 DIAGNOSIS — M25.551 BILATERAL HIP PAIN: ICD-10-CM

## 2020-05-20 DIAGNOSIS — M25.551 BILATERAL HIP PAIN: Primary | ICD-10-CM

## 2020-05-20 DIAGNOSIS — M25.552 BILATERAL HIP PAIN: ICD-10-CM

## 2020-05-20 DIAGNOSIS — M47.816 LUMBAR SPONDYLOSIS: ICD-10-CM

## 2020-05-20 DIAGNOSIS — M70.62 GREATER TROCHANTERIC BURSITIS OF BOTH HIPS: ICD-10-CM

## 2020-05-20 DIAGNOSIS — M25.552 BILATERAL HIP PAIN: Primary | ICD-10-CM

## 2020-05-20 PROCEDURE — 99999 PR PBB SHADOW E&M-EST. PATIENT-LVL III: CPT | Mod: PBBFAC,,, | Performed by: FAMILY MEDICINE

## 2020-05-20 PROCEDURE — 99213 OFFICE O/P EST LOW 20 MIN: CPT | Mod: S$GLB,,, | Performed by: FAMILY MEDICINE

## 2020-05-20 PROCEDURE — 99213 PR OFFICE/OUTPT VISIT, EST, LEVL III, 20-29 MIN: ICD-10-PCS | Mod: S$GLB,,, | Performed by: FAMILY MEDICINE

## 2020-05-20 PROCEDURE — 73521 XR HIPS BILATERAL 2 VIEW INCL AP PELVIS: ICD-10-PCS | Mod: 26,,, | Performed by: RADIOLOGY

## 2020-05-20 PROCEDURE — 73521 X-RAY EXAM HIPS BI 2 VIEWS: CPT | Mod: 26,,, | Performed by: RADIOLOGY

## 2020-05-20 PROCEDURE — 99999 PR PBB SHADOW E&M-EST. PATIENT-LVL III: ICD-10-PCS | Mod: PBBFAC,,, | Performed by: FAMILY MEDICINE

## 2020-05-20 PROCEDURE — 3008F PR BODY MASS INDEX (BMI) DOCUMENTED: ICD-10-PCS | Mod: CPTII,S$GLB,, | Performed by: FAMILY MEDICINE

## 2020-05-20 PROCEDURE — 73521 X-RAY EXAM HIPS BI 2 VIEWS: CPT | Mod: TC

## 2020-05-20 PROCEDURE — 3008F BODY MASS INDEX DOCD: CPT | Mod: CPTII,S$GLB,, | Performed by: FAMILY MEDICINE

## 2020-05-20 RX ORDER — MELOXICAM 7.5 MG/1
7.5 TABLET ORAL DAILY
Qty: 15 TABLET | Refills: 0 | Status: SHIPPED | OUTPATIENT
Start: 2020-05-20 | End: 2020-08-12

## 2020-05-20 NOTE — PROGRESS NOTES
Lisa Garcia, a 34 y.o. male, is here for evaluation of Vaibhav hip pain. Pt. has had hip pain/discomfort for about 2 years.      HISTORY OF PRESENT ILLNESS   Location: lateral/anterior thigh, Left = Right  Onset: Chronic since 2018  Palliative:    Relative rest   Oral analgesics   [facility] fPT for lumbar spine/hip  Provocative:   ADLs  prolonged sitting  prolonged ambulation   Prior: No hx of Orthopaedic surgery  Progression: worsening discomfort   Quality:    sharp  Radiation: none  Severity: per nursing documentation  Timing: intermittent with use  Trauma: none specific      Review of systems (ROS):  A 10+ review of systems was performed with pertinent positives and negatives noted above in the history of present illness. Other systems were negative unless otherwise specified.      PHYSICAL EXAMINATION  General:  The patient is alert and oriented x 3.  Mood is pleasant.  Observation of ears, eyes and nose reveal no gross abnormalities.  HEENT: NCAT, sclera nonicteric  Lungs: Respirations are equal and unlabored.   Gait is coordinated. Patient can toe walk and heel walk without difficulty.    HIP/PELVIS EXAMINATION    Observation/Inspection  Gait:   Nonantalgic   Alignment:  Neutral   Scars:   None   Muscle atrophy: None   Effusion:  None   Warmth:  None   Discoloration:   None   Leg lengths:   Equal   Pelvis:    Level     Tenderness/Crepitus (T/C):      T / C  Trochanteric bursa   ++ / -  Piriformis    - / -  SI joint    - / -  Psoas tendon   - / -  Rectus insertion  - / -  Adductor insertion  - / -  Pubic symphysis  - / -    ROM: (* = pain)    Flexion:      120 degrees*  External rotation:   40 degrees  Internal rotation with axial load:  10 degrees*  Internal rotation without axial load:  10 degrees*  Abduction:    45 degrees  Adduction:     20 degrees    Special Tests:  Pain w/ forced internal rotation (FADIR):  ++*   Pain w/ forced external rotation (PREETI):  -   Circumduction test:     -  Lanette  test:     -   Log roll:       ++   Snapping hip (internal):    -   Sit-up pain:      -   Resisted sit-up pain:     +   Resisted sit-up with adductor contraction pain:  -   Step-down test:     +  Trendelenburg test:     +  Bridge test      +     Extremity Neuro-vascular Examination:   Sensation:  Grossly intact to light touch all dermatomal regions.   Motor Function:  Fully intact motor function at hip, knee, foot and ankle    DTRs;  quadriceps and  achilles 2+.  No clonus and downgoing Babinski.    Vascular status:  DP and PT pulses 2+, brisk capillary refill, symmetric.    Skin:  intact, compartments soft.    Other Findings:    ASSESSMENT & PLAN  Assessment  #1 lumbar spondylosis  #2 chronic hip pain, right > left   W/ GTBursitis, right > left    No evidence of neurologic pathology  No evidence of vascular pathology    Imaging studies reviewed:   X-ray lumbar spine, 19.08  X-ray hip, kev 20.05    Plan  We discussed the importance of appropriate diet, weight, and regular exercise    We discussed options including    Watchful waiting / relative rest    Physical therapy x   Injection therapy    Consultation    The patient chooses As above   x = prescribed  CSI = corticosteroid injection  VSI = viscosupplement injection  PRPI = platelet rich plasma injection  ia = intra articular  R = right  L = left  B = bilateral     Physical Therapy        Formal (fPT), @ [facility] facility [facility]   Formal (fPT), @ [facility] facility        Homegoing (hgPT), per concurrent fPT recommendations    Homegoing (hgPT), per prior fPT recommendations    Homegoing (hgPT), handout provided        w/  (atPT)    [blank] = not prescribed  x = prescribed  b = prescribed, and begin as indicated  t = continue as indicated  r = prescribed, and restart as indicated  p = completed prior as indicated  hs = prescribed, and with high school   col = prescribed, and with college or university   nf = physical therapy was  recommended, but patient is not interested in PT at this time    Activity (e.g. sports, work) restrictions    [blank] = as tolerated  pt = per physical therapist  at = per     Bracing    [blank] = not prescribed  r = recommended, but not fit with at todays visit  f = prescribed and fit with at todays visit  t = continue as indicated    Pain management    [blank] = No prescription necessary. A handout detailing dosing of appropriate   over-the-counter musculoskeletal analgesics was made available to the patient.   m = meloxicam x 14 days  mp = 14 day course of meloxicam prescribed prior    Follow up 12   [blank] = as needed  [number] = in [number] weeks  CSI = for corticosteroid injection  VSI = for viscosupplement injection or injection series  PRP = for platelet rich plasma injection or injection series  MRI = after MRI imaging  ns = should surgical options be deferred (no surgery)  o = appointment offered, deferred by patient    Should symptoms worsen or fail to resolve, consider    Revisiting the above options and / or csi gtb  MRI lumber vs.  MRI hip       Vocation:    at [employer], works at [id number]

## 2020-05-27 ENCOUNTER — TELEPHONE (OUTPATIENT)
Dept: ORTHOPEDICS | Facility: CLINIC | Age: 35
End: 2020-05-27

## 2020-05-27 NOTE — TELEPHONE ENCOUNTER
Left patient a voicemail stating the patients appointment needed to be rescheduled for 6/30/20 and xrays need to be done.

## 2020-05-28 ENCOUNTER — CLINICAL SUPPORT (OUTPATIENT)
Dept: REHABILITATION | Facility: HOSPITAL | Age: 35
End: 2020-05-28
Attending: FAMILY MEDICINE
Payer: COMMERCIAL

## 2020-05-28 DIAGNOSIS — M25.552 BILATERAL HIP PAIN: ICD-10-CM

## 2020-05-28 DIAGNOSIS — M25.551 BILATERAL HIP PAIN: ICD-10-CM

## 2020-05-28 DIAGNOSIS — R26.9 GAIT ABNORMALITY: ICD-10-CM

## 2020-05-28 DIAGNOSIS — M25.651 DECREASED RANGE OF MOTION OF BOTH HIPS: Primary | ICD-10-CM

## 2020-05-28 DIAGNOSIS — M25.652 DECREASED RANGE OF MOTION OF BOTH HIPS: Primary | ICD-10-CM

## 2020-05-28 DIAGNOSIS — M25.659 STIFFNESS OF HIP JOINT, UNSPECIFIED LATERALITY: ICD-10-CM

## 2020-05-28 DIAGNOSIS — M47.816 LUMBAR SPONDYLOSIS: ICD-10-CM

## 2020-05-28 DIAGNOSIS — R29.898 WEAKNESS OF BOTH HIPS: ICD-10-CM

## 2020-05-28 DIAGNOSIS — Z78.9 DIFFICULTY NAVIGATING STAIRS: ICD-10-CM

## 2020-05-28 PROCEDURE — 97161 PT EVAL LOW COMPLEX 20 MIN: CPT

## 2020-05-28 PROCEDURE — 97110 THERAPEUTIC EXERCISES: CPT

## 2020-05-28 NOTE — PLAN OF CARE
[facility]  Physical Therapy Initial Evaluation    Name: Lisa Garcia  Clinic Number: [id number]    Therapy Diagnosis:   Encounter Diagnoses   Name Primary?    Bilateral hip pain     Lumbar spondylosis     Decreased range of motion of both hips Yes    Weakness of both hips     Gait abnormality     Difficulty navigating stairs     Stiffness of hip joint, unspecified laterality      Physician: Marlo Galeana, *    Physician Orders: PT Eval and Treat  Medical Diagnosis from Referral: M25.551,M25.552 (ICD-10-CM) - Bilateral hip pain M47.816 (ICD-10-CM) - Lumbar spondylosis   Evaluation Date: 5/28/2020  Authorization Period Expiration: 12/31/2020  Plan of Care Expiration: 9/25/2020  Visit # / Visits authorized: 1/30    Time In: 1645  Time Out: 1755  Total Billable Time: 70 minutes    Precautions: Standard    Subjective   Date of onset: several months ago  History of current condition - Lisa reports: history of low back pain with degeneration at L4/L5. Has been doing healthy back program at [facility] but has noticed progressive onset of bilateral hip pain during this time. Started as R hip tightness and progressed to pain in both sides. Pain with sidelying. States father had hx of bilat COREY. Had XR done but states that imaging results were unremarkable. Pain is localized to lateral hips and limits functional mobility at longer distances and higher intensities. Additionally reports hx of ELAINA on R side.     Medical History:   Past Medical History:   Diagnosis Date    Alopecia of scalp 2009    Medical history non-contributory        Surgical History:   Lisa Garcia  has a past surgical history that includes Appendectomy.    Medications:   Lisa has a current medication list which includes the following prescription(s): clindamycin, ibuprofen, and meloxicam.    Allergies:   Review of patient's allergies indicates:  No Known Allergies     Imaging, MRI studies (hips): Two views  "bilateral. Right: There is mild DJD and impingement change. Left: There is mild DJD and impingement change.   XR (lumbar): There are 5 non-rib-bearing lumbar type vertebral bodies.  Minimal levoscoliosis centered at L3.  No evidence of an acute fracture.  AP alignment is relatively well maintained.  No instability.  There is intervertebral disc height loss at L4-5 and L5-S1.     Prior Therapy: [facility] with [facility] over last several months  [place]: [place] in [place]  Occupation:  with [employer]; primarily desk work  Prior Level of Function: indep w/ ADLs, work, and recreation  Current Level of Function: limited recreation, unable to exercise    Pain:  Current 0/10, worst 8/10, best 0/10   Location: lateral hip  Description: Sharp and stabbing  Aggravating Factors: internal rotation, running, lateral movement  Easing Factors: stretching    Pts goals: to improve tolerance to walking and running and return to recreational exercise and tennis    Objective     Observation: no acute distress    Posture: unremarkable    Gait: hip shift to L, trunk shift to R with R stance phase    Hip Range of Motion:   Left active Left Passive Right active  Right Passive   Flexion 120 120 120 120   Abduction 45 p! 45 45 p! 45   Extension 10 10 10 10   Ext. Rotation 70 80 70 80   Int. Rotation 5 10 5 10     Lower Extremity Strength  Right LE  Left LE    Hip flexion: 4+/5 Hip flexion: 4+/5   Hip Internal Rotation:  4/5    Hip Internal Rotation: 4/5      Hip External Rotation: 4/5    Hip External Rotation: 4/5      Hip extension:  4/5 Hip extension: 4/5   Hip abduction: 3+/5 Hip abduction: 3+/5   Hip adduction: 4+/5 Hip adduction 4+/5     Special Tests:  Bridge Test: -  Step down Test: R: + ; L +  PREETI: -  FADIR: +  SCOUR: +    Plank: <15"    SL stance: 10-15" EC L, <5" EC R    Flexibility: SLR to 40 deg bilat; otherwise WNL    Joint Mobility: possible mild dec lat glide at hip bilat; difficult to assess 2/2 pain w/ " "FADIR    Palpation: mild TTP over greater trochanter, improved now w/ medication    Sensation: intact    Edema: none      CMS Impairment/Limitation/Restriction for FOTO Hip Survey    Therapist reviewed FOTO scores for Lisa Garcia on 5/28/2020.   FOTO documents entered into [employer] - see Media section.    Limitation Score: 53%  Category: Mobility         TREATMENT   Treatment Time In: 1715  Treatment Time Out: 1730  Total Treatment time separate from Evaluation: 15 minutes    Lisa received therapeutic exercises to develop strength, endurance and core stabilization for 15 minutes including:  - Lateral band walk 3 laps GTB  - SL mini squat isometric 3x15" ea VC for hip abd/level pelvis  - Deadbug w/ alt LE ext x10 ea TC for abdominal drawing    Home Exercises and Patient Education Provided    Education provided:   - Role of PT, PT POC    Written Home Exercises Provided: yes.  Exercises were reviewed and Lisa was able to demonstrate them prior to the end of the session.  Lisa demonstrated good  understanding of the education provided.     See EMR under Patient Instructions for exercises provided 5/28/2020.    Assessment   Lisa is a 34 y.o. male referred to outpatient physical therapy with a medical diagnosis of bilateral hip pain with lumbar spondylosis. Pt presents with notable deficits in bilateral hip IR motion and reduced tolerance to combined flexion/IR consistent with change in bony morphology and potential ARIEL. Additionally, painful symptoms over the greater trochanter bilat coupled with deficits in hip abductor strength seem related to potential changes in mechanics with gait and functional activities that have created compressive effect over lateral hip and potentially bursa. Given this pt's hx of chronic LBP and flexion intolerance and reduced core stability, it is likely that some of the new hip symptoms were brought on by changes in mechanics of activities d/t LBP. This pt was counseled extensively on " the normalcy of his bony anatomy and external hip rotation with normal activities that he has actively been working to correct ineffectively. Discussed importance of activity modification and hip/core retraining to improve mechanics and tolerance to loading moving forward with pt eager and agreeable.    Pt prognosis is Good.   Pt will benefit from skilled outpatient physical therapy to address the deficits listed above and in the chart below, provide pt/family education, and to maximize pt's level of independence.     Plan of care discussed with patient: Yes  Pt's spiritual, cultural and educational needs considered and patient is agreeable to the plan of care and goals as stated below:     Anticipated Barriers for therapy: Covid19    Medical Necessity is demonstrated by the following  History  Co-morbidities and personal factors that may impact the plan of care Co-morbidities:   none    Personal Factors:   no deficits     low   Examination  Body Structures and Functions, activity limitations and participation restrictions that may impact the plan of care Body Regions:   back  lower extremities    Body Systems:    gross symmetry  ROM  strength  balance  gait  motor control    Participation Restrictions:   Walking, running, tennis    Activity limitations:   Learning and applying knowledge  no deficits    General Tasks and Commands  no deficits    Communication  no deficits    Mobility  lifting and carrying objects  walking    Self care  no deficits    Domestic Life  doing house work (cleaning house, washing dishes, laundry)    Interactions/Relationships  no deficits    Life Areas  no deficits    Community and Social Life  no deficits         low   Clinical Presentation stable and uncomplicated low   Decision Making/ Complexity Score: low     Goals:  Short-Term Goals: 2-4 weeks  - The patient will be independent with initial home exercise program.  - The patient will increase strength of hip muscles with MMT grossly  to at least 4/5 to improve hip contribution and stability in weight bearing positions  - The patient will demonstrate ability to ambulate community distances with pain <3/10 at worst for improved functional mobility.    Long-Term Goals: 10-12 weeks  - Pt to achieve <30% limitation as measured by the FOTO to demonstrate decreased disability.  - The patient will increase strength of hip muscles with MMT grossly to at least 4+/5 to improve hip contribution and stability in weight bearing positions  - The patient will demonstrate ability to ambulate community distances without pain for improved functional mobility.  - The patient will demonstrate ability to return to modified running and tennis to facilitate return to healthy lifestyle behaviors    Plan   Plan of care Certification: 5/28/2020 to 9/25/2020    Outpatient Physical Therapy 1 times weekly for 10 weeks to include the following interventions: Gait Training, Manual Therapy, Moist Heat/ Ice, Neuromuscular Re-ed, Patient Education, Self Care, Therapeutic Activites and Therapeutic Exercise.     Alanis Birmingham, PT

## 2020-05-28 NOTE — PROGRESS NOTES
Please see the below listed initial evaluation and POC. Thank you for your consult.    Alanis Birmingham, PT, DPT

## 2020-06-01 ENCOUNTER — TELEPHONE (OUTPATIENT)
Dept: ORTHOPEDICS | Facility: CLINIC | Age: 35
End: 2020-06-01

## 2020-06-01 DIAGNOSIS — M79.642 LEFT HAND PAIN: Primary | ICD-10-CM

## 2020-06-01 NOTE — TELEPHONE ENCOUNTER
Informed pt that XRs are needed prior to appt. He is unable to get XR this afternoon/evening. Scheduled tomorrow at [facility], 0715 before 0815 appt c Dr. Vines. Pt expressed understanding & was thankful.

## 2020-06-02 ENCOUNTER — TELEPHONE (OUTPATIENT)
Dept: ORTHOPEDICS | Facility: CLINIC | Age: 35
End: 2020-06-02

## 2020-06-02 ENCOUNTER — HOSPITAL ENCOUNTER (OUTPATIENT)
Dept: RADIOLOGY | Facility: HOSPITAL | Age: 35
Discharge: HOME | End: 2020-06-02
Attending: ORTHOPAEDIC SURGERY
Payer: COMMERCIAL

## 2020-06-02 ENCOUNTER — OFFICE VISIT (OUTPATIENT)
Dept: ORTHOPEDICS | Facility: CLINIC | Age: 35
End: 2020-06-02
Payer: COMMERCIAL

## 2020-06-02 VITALS
DIASTOLIC BLOOD PRESSURE: 74 MMHG | HEART RATE: 70 BPM | HEIGHT: 74 IN | SYSTOLIC BLOOD PRESSURE: 111 MMHG | BODY MASS INDEX: 31.44 KG/M2 | WEIGHT: 245 LBS

## 2020-06-02 DIAGNOSIS — M79.642 LEFT HAND PAIN: ICD-10-CM

## 2020-06-02 DIAGNOSIS — R22.32 SUBCUTANEOUS NODULE OF LEFT HAND: Primary | ICD-10-CM

## 2020-06-02 PROCEDURE — 3008F BODY MASS INDEX DOCD: CPT | Mod: CPTII,S$GLB,, | Performed by: ORTHOPAEDIC SURGERY

## 2020-06-02 PROCEDURE — 99999 PR PBB SHADOW E&M-EST. PATIENT-LVL III: CPT | Mod: PBBFAC,,, | Performed by: ORTHOPAEDIC SURGERY

## 2020-06-02 PROCEDURE — 73130 X-RAY EXAM OF HAND: CPT | Mod: TC,LT

## 2020-06-02 PROCEDURE — 3008F PR BODY MASS INDEX (BMI) DOCUMENTED: ICD-10-PCS | Mod: CPTII,S$GLB,, | Performed by: ORTHOPAEDIC SURGERY

## 2020-06-02 PROCEDURE — 73130 X-RAY EXAM OF HAND: CPT | Mod: 26,LT,, | Performed by: RADIOLOGY

## 2020-06-02 PROCEDURE — 99213 PR OFFICE/OUTPT VISIT, EST, LEVL III, 20-29 MIN: ICD-10-PCS | Mod: S$GLB,,, | Performed by: ORTHOPAEDIC SURGERY

## 2020-06-02 PROCEDURE — 73130 XR HAND COMPLETE 3 VIEW LEFT: ICD-10-PCS | Mod: 26,LT,, | Performed by: RADIOLOGY

## 2020-06-02 PROCEDURE — 99213 OFFICE O/P EST LOW 20 MIN: CPT | Mod: S$GLB,,, | Performed by: ORTHOPAEDIC SURGERY

## 2020-06-02 PROCEDURE — 99999 PR PBB SHADOW E&M-EST. PATIENT-LVL III: ICD-10-PCS | Mod: PBBFAC,,, | Performed by: ORTHOPAEDIC SURGERY

## 2020-06-02 NOTE — TELEPHONE ENCOUNTER
Attempted to contact pt. Left voicemail reminding pt to make his way to 9th floor of [facility].

## 2020-06-02 NOTE — PROGRESS NOTES
Subjective:      Patient ID: Lisa Garcia is a 34 y.o. male.    Chief Complaint: c/o lump on left hand      Referring Provider: Self, Aaareferral     History of Present Illness:  Patient is a 34 y.o. left hand dominant male who presents today with complaints of pain along the dorsal aspect of his left hand between 1st and 2nd [facility]. Denies trauma to area    The patient is a/an Shell .    Onset of symptoms/DOI was approximately 4 months, insidious onset.    Symptoms are aggravated by typing at times, no pain with ADLs.    Symptoms are alleviated by rest.    Symptoms consist of mild discomfort, mild tenderness to palpation. Denies redness, swelling, ecchymosis. Denies numbness, tingling, burning, radiating pain.     The patient rates pain as a 0/10.    The patient describes pain as mild discomfort.     Attempted treatment(s) and/or interventions include nothing..     The patient denies any fevers, chills, N/V, D/C and presents for evaluation.      Review of patient's allergies indicates:  No Known Allergies      Current Outpatient Medications   Medication Sig Dispense Refill    clindamycin (CLEOCIN T) 1 % external solution Apply topically 2 (two) times daily. 1 Bottle 3    meloxicam (MOBIC) 7.5 MG tablet Take 1 tablet (7.5 mg total) by mouth once daily. 15 tablet 0    ibuprofen (ADVIL,MOTRIN) 200 MG tablet Take 200 mg by mouth every 6 (six) hours as needed for Pain.       No current facility-administered medications for this visit.        Past Medical History:   Diagnosis Date    Alopecia of scalp 2009    Medical history non-contributory        Past Surgical History:   Procedure Laterality Date    APPENDECTOMY         Review of Systems:  Constitutional: Negative for chills and fever.   Respiratory: Negative for cough and shortness of breath.    Gastrointestinal: Negative for nausea and vomiting.   Skin: Negative for rash.   Neurological: Negative for dizziness and headaches.  "  Psychiatric/Behavioral: Negative for depression.   MSK as in HPI       OBJECTIVE:     PHYSICAL EXAM:  /74 (BP Location: Left arm, Patient Position: Sitting, BP Method: Large (Automatic))   Pulse 70   Ht 6' 2" (1.88 m)   Wt 111.1 kg (245 lb)   BMI 31.46 kg/m²       RUE:  Good active ROM of the wrist and fingers. AIN/PIN/Radial/Median/Ulnar Nerves assessed in isolation without deficit. Radial & Ulnar arteries palpated 2+. Capillary Refill <3s.    LUE:  Good active ROM of the wrist and fingers. AIN/PIN/Radial/Median/Ulnar Nerves assessed in isolation without deficit. Radial & Ulnar arteries palpated 2+. Capillary Refill <3s. Palpable nodule at the proximal aspect of the 1st & 2nd [facility] webspace.   No discoloration and no pulsatile mass      Radiology/Procedures:  Xray Left Hand 06/02/20  Three views: No fracture dislocation bone destruction seen.  No trauma seen.  No foreign body seen.      ASSESSMENT/PLAN:       ICD-10-CM ICD-9-CM   1. Subcutaneous nodule of left hand R22.32 782.2       Orders Placed This Encounter    US Extremity Non Vascular Limited Left     Orders Placed This Encounter   Procedures    US Extremity Non Vascular Limited Left       Plan:   - Reviewed XR.   - US ordered to further access nodule.  - F/u after US.          The patient indicates understanding of these issues and agrees to the plan.      This note has been scribed in part by Pau Cardona MS, OT, my Sports Medicine Assistant (SMA). This SMA performed & documented a complete history pre-assessment including the history of present illness, which I, Martha Obregon MD, explored & confirmed personally with the patient. The SMA has scribed portions of this note including my physical exanimation, diagnostic imaging interpretation, procedures performed, my plan of care & diagnosis. I agree that the scribed documentation is accurate & complete.     "

## 2020-06-05 ENCOUNTER — HOSPITAL ENCOUNTER (OUTPATIENT)
Dept: RADIOLOGY | Facility: OTHER | Age: 35
Discharge: HOME | End: 2020-06-05
Attending: ORTHOPAEDIC SURGERY
Payer: COMMERCIAL

## 2020-06-05 DIAGNOSIS — R22.32 SUBCUTANEOUS NODULE OF LEFT HAND: ICD-10-CM

## 2020-06-05 PROCEDURE — 76882 US EXTREMITY NON VASCULAR LIMITED LEFT: ICD-10-PCS | Mod: 26,LT,, | Performed by: RADIOLOGY

## 2020-06-05 PROCEDURE — 76882 US LMTD JT/FCL EVL NVASC XTR: CPT | Mod: 26,LT,, | Performed by: RADIOLOGY

## 2020-06-05 PROCEDURE — 76882 US LMTD JT/FCL EVL NVASC XTR: CPT | Mod: TC,LT

## 2020-06-09 ENCOUNTER — OFFICE VISIT (OUTPATIENT)
Dept: ORTHOPEDICS | Facility: CLINIC | Age: 35
End: 2020-06-09
Payer: COMMERCIAL

## 2020-06-09 ENCOUNTER — CLINICAL SUPPORT (OUTPATIENT)
Dept: REHABILITATION | Facility: HOSPITAL | Age: 35
End: 2020-06-09
Attending: FAMILY MEDICINE
Payer: COMMERCIAL

## 2020-06-09 VITALS — SYSTOLIC BLOOD PRESSURE: 110 MMHG | DIASTOLIC BLOOD PRESSURE: 70 MMHG | HEART RATE: 70 BPM

## 2020-06-09 DIAGNOSIS — Z78.9 DIFFICULTY NAVIGATING STAIRS: ICD-10-CM

## 2020-06-09 DIAGNOSIS — R29.898 WEAKNESS OF BOTH HIPS: ICD-10-CM

## 2020-06-09 DIAGNOSIS — M25.651 DECREASED RANGE OF MOTION OF BOTH HIPS: ICD-10-CM

## 2020-06-09 DIAGNOSIS — R26.9 GAIT ABNORMALITY: ICD-10-CM

## 2020-06-09 DIAGNOSIS — R22.32 SUBCUTANEOUS NODULE OF LEFT HAND: Primary | ICD-10-CM

## 2020-06-09 DIAGNOSIS — M25.652 DECREASED RANGE OF MOTION OF BOTH HIPS: ICD-10-CM

## 2020-06-09 DIAGNOSIS — M25.659 STIFFNESS OF HIP JOINT, UNSPECIFIED LATERALITY: ICD-10-CM

## 2020-06-09 PROCEDURE — 99999 PR PBB SHADOW E&M-EST. PATIENT-LVL II: CPT | Mod: PBBFAC,,, | Performed by: ORTHOPAEDIC SURGERY

## 2020-06-09 PROCEDURE — 97112 NEUROMUSCULAR REEDUCATION: CPT

## 2020-06-09 PROCEDURE — 99213 PR OFFICE/OUTPT VISIT, EST, LEVL III, 20-29 MIN: ICD-10-PCS | Mod: S$GLB,,, | Performed by: ORTHOPAEDIC SURGERY

## 2020-06-09 PROCEDURE — 97140 MANUAL THERAPY 1/> REGIONS: CPT

## 2020-06-09 PROCEDURE — 97110 THERAPEUTIC EXERCISES: CPT

## 2020-06-09 PROCEDURE — 99213 OFFICE O/P EST LOW 20 MIN: CPT | Mod: S$GLB,,, | Performed by: ORTHOPAEDIC SURGERY

## 2020-06-09 PROCEDURE — 99999 PR PBB SHADOW E&M-EST. PATIENT-LVL II: ICD-10-PCS | Mod: PBBFAC,,, | Performed by: ORTHOPAEDIC SURGERY

## 2020-06-09 NOTE — PROGRESS NOTES
I have personally taken the history and examined the patient. I agree with the Hand Surgery PA's note. The plan will be observation. It is a large cyst in an abnormal area. Would prefer to have close f/u over the cyst. Pt would like to observe at this time.

## 2020-06-09 NOTE — PROGRESS NOTES
Subjective:      Patient ID: Lisa Garcia is a 34 y.o. male.    Chief Complaint: No chief complaint on file.      Referring Provider: No ref. provider found     History of Present Illness:  Patient is a 34 y.o. left hand dominant male who presents today with complaints of pain along the dorsal aspect of his left [facility]. Denies trauma to area    The patient is a/an [employer] .    Onset of symptoms/DOI was approximately 4 months, insidious onset.    Symptoms are aggravated by typing at times, no pain with ADLs.    Symptoms are alleviated by rest.    Symptoms consist of mild discomfort, mild tenderness to palpation. Denies redness, swelling, ecchymosis. Denies numbness, tingling, burning, radiating pain.     The patient rates pain as a 0/10.    The patient describes pain as mild discomfort.     Attempted treatment(s) and/or interventions include nothing..     The patient denies any fevers, chills, N/V, D/C and presents for evaluation.    6/9/20  Pt presents for f/u right hand cyst.     Review of patient's allergies indicates:  No Known Allergies      Current Outpatient Medications   Medication Sig Dispense Refill    clindamycin (CLEOCIN T) 1 % external solution Apply topically 2 (two) times daily. 1 Bottle 3    ibuprofen (ADVIL,MOTRIN) 200 MG tablet Take 200 mg by mouth every 6 (six) hours as needed for Pain.      meloxicam (MOBIC) 7.5 MG tablet Take 1 tablet (7.5 mg total) by mouth once daily. 15 tablet 0     No current facility-administered medications for this visit.        Past Medical History:   Diagnosis Date    Alopecia of scalp 2009    Medical history non-contributory        Past Surgical History:   Procedure Laterality Date    APPENDECTOMY         Review of Systems:  Constitutional: Negative for chills and fever.   Respiratory: Negative for cough and shortness of breath.    Gastrointestinal: Negative for nausea and vomiting.   Skin: Negative for rash.   Neurological:  Negative for dizziness and headaches.   Psychiatric/Behavioral: Negative for depression.   MSK as in HPI       OBJECTIVE:     PHYSICAL EXAM:  /70   Pulse 70     LUE:  Good active ROM of the wrist and fingers. AIN/PIN/Radial/Median/Ulnar Nerves assessed in isolation without deficit. Radial & Ulnar arteries palpated 2+. Capillary Refill <3s. Palpable nodule at the proximal aspect of the 1st & 2nd MC webspace.   No discoloration and no pulsatile mass      Radiology/Procedures:  Xray Left Hand 06/02/20  Three views: No fracture dislocation bone destruction seen.  No trauma seen.  No foreign body seen.    US left hand 6/5/20  Impression     Palpable abnormality corresponds to a slightly complex cystic lesion measuring 1.7 cm maximum diameter.  This may represent a ganglion cyst.       ASSESSMENT/PLAN:       ICD-10-CM ICD-9-CM   1. Subcutaneous nodule of left hand R22.32 782.2     Plan:   -will continue to monitor

## 2020-06-09 NOTE — PROGRESS NOTES
"  Physical Therapy Daily Treatment Note     Name: Lisa Garcia  Clinic Number: [id number]    Therapy Diagnosis:   Encounter Diagnoses   Name Primary?    Stiffness of hip joint, unspecified laterality     Difficulty navigating stairs     Gait abnormality     Weakness of both hips     Decreased range of motion of both hips      Physician: Marlo Galeana, *    Visit Date: 6/9/2020  Physician Orders: PT Eval and Treat  Medical Diagnosis from Referral: M25.551,M25.552 (ICD-10-CM) - Bilateral hip pain M47.816 (ICD-10-CM) - Lumbar spondylosis   Evaluation Date: 5/28/2020  Authorization Period Expiration: 12/31/2020  Plan of Care Expiration: 9/25/2020  Visit # / Visits authorized: 2/30    Time In: 1650  Time Out: 1750  Total Billable Time: 55 minutes    Precautions: Standard    Subjective     Pt reports: symptoms have fluctuated some since last visit. Thinks hip pain has increased since finishing Meloxicam course. Fair tolerance to HEP, having trouble w/ exercises loading lateral hip structures.  He was compliant with home exercise program.  Response to previous treatment: no adverse effects  Functional change: too soon to tell    Pain: 2/10  Location: right hip      Objective     Lisa received therapeutic exercises to develop strength, endurance, ROM, flexibility, posture and core stabilization for 30 minutes including:  - Quadruped rock back x15  - Lateral band walk 2 laps RTB  - Deadbug w/ alt LE ext x10 ea TC for abdominal drawing  - Modified side plank x30" ea    Lisa received the following manual therapy techniques: Joint mobilizations were applied to the: bilateral hips for 8 minutes, including:  - Hip inferior glide mob gr II-IV bilat    Lisa participated in neuromuscular re-education activities to improve: Balance, Coordination, Kinesthetic, Sense, Proprioception and Posture for 17 minutes. The following activities were included:  - SL mini squat 3x10 ea VC for level pelvis  - SL hip hinge 2x10 ea " "emphasis on lateral hip activation  - SL hip abduction 10x5" ea    Home Exercises Provided and Patient Education Provided     Education provided:   - Role of PT, PT POC, hip anatomy, tendinopathy mechanism    Written Home Exercises Provided: Patient instructed to cont prior HEP.  Exercises were reviewed and Lisa was able to demonstrate them prior to the end of the session.  Lisa demonstrated good  understanding of the education provided.     See EMR under Patient Instructions for exercises provided prior visit.    Assessment     Good response to manual techniques today with some mild improvements in hip IR bilaterally afterwards. Still struggling w/ CKC activities challenging hip abductor control of pelvis with pain in positions in which the lateral hip structures are lengthened over the greater trochanter. Good response to exercise modifications and cueing for technique. Significant difficulty with isolated hip abductor strengthening in open chain with easily achieved training effect.    Lisa is progressing well towards his goals.   Pt prognosis is Good.     Pt will continue to benefit from skilled outpatient physical therapy to address the deficits listed in the problem list box on initial evaluation, provide pt/family education and to maximize pt's level of independence in the home and community environment.     Pt's spiritual, cultural and educational needs considered and pt agreeable to plan of care and goals.    Anticipated barriers to physical therapy: Covid-19    Goals:   Short-Term Goals: 2-4 weeks  - The patient will be independent with initial home exercise program.  - The patient will increase strength of hip muscles with MMT grossly to at least 4/5 to improve hip contribution and stability in weight bearing positions  - The patient will demonstrate ability to ambulate community distances with pain <3/10 at worst for improved functional mobility.     Long-Term Goals: 10-12 weeks  - Pt to achieve <30% " limitation as measured by the FOTO to demonstrate decreased disability.  - The patient will increase strength of hip muscles with MMT grossly to at least 4+/5 to improve hip contribution and stability in weight bearing positions  - The patient will demonstrate ability to ambulate community distances without pain for improved functional mobility.  - The patient will demonstrate ability to return to modified running and tennis to facilitate return to healthy lifestyle behaviors    Plan     Continue w/ current POC emphasizing hip and core strength, gait retraining, and gradual progression of functional activities as tolerated    Plan of care Certification: 5/28/2020 to 9/25/2020  Outpatient Physical Therapy 1 times weekly for 10 weeks to include the following interventions: Gait Training, Manual Therapy, Moist Heat/ Ice, Neuromuscular Re-ed, Patient Education, Self Care, Therapeutic Activites and Therapeutic Exercise.    Alanis Birmingham PT

## 2020-06-25 NOTE — PROGRESS NOTES
"  Physical Therapy Daily Treatment Note     Name: Lisa Garcia  Clinic Number: [id number]    Therapy Diagnosis:   Encounter Diagnoses   Name Primary?    Stiffness of hip joint, unspecified laterality     Difficulty navigating stairs     Gait abnormality     Weakness of both hips     Decreased range of motion of both hips      Physician: Marlo Galeana, *    Visit Date: 6/26/2020  Physician Orders: PT Eval and Treat  Medical Diagnosis from Referral: M25.551,M25.552 (ICD-10-CM) - Bilateral hip pain M47.816 (ICD-10-CM) - Lumbar spondylosis   Evaluation Date: 5/28/2020  Authorization Period Expiration: 12/31/2020  Plan of Care Expiration: 9/25/2020  Visit # / Visits authorized: 3/30    Time In: 1546  Time Out: 1642  Total Billable Time: 51 minutes    Precautions: Standard    Subjective     Pt reports: that he's been doing well. Working hard on HEP. Intermittent symptoms but improving w/ new mattress.  He was compliant with home exercise program.  Response to previous treatment: no adverse effects  Functional change: better tolerance to exercise including biking    Pain: 2/10  Location: right hip      Objective     Lisa received therapeutic exercises to develop strength, endurance, ROM, flexibility, posture and core stabilization for 16 minutes including:  - Quadruped rock back x20  - Mod side plank 2x30" ea    Lisa received the following manual therapy techniques: Joint mobilizations were applied to the: bilateral hips for 10 minutes, including:  - Hip inferior/lat glide mob gr II-IV bilat    Lisa participated in neuromuscular re-education activities to improve: Balance, Coordination, Kinesthetic, Sense, Proprioception and Posture for 30 minutes. The following activities were included:  - Sidelying active IR 90/90 2x10 (5") ea  - SL hip abduction 10x5" ea  - SL mini squat 3x15 ea VC for level pelvis  - Ball on wall runner's squat 2x10 ea VC for active hip abd    Home Exercises Provided and Patient " Education Provided     Education provided:   - Role of PT, PT POC, hip anatomy, tendinopathy mechanism    Written Home Exercises Provided: Patient instructed to cont prior HEP.  Exercises were reviewed and Lisa was able to demonstrate them prior to the end of the session.  Lisa demonstrated good  understanding of the education provided.     See EMR under Patient Instructions for exercises provided prior visit.    Assessment     Doing better today. Good response to manual techniques with subsequent improvement in flexion and IR ROM. Able to improve rockback and active IR afterwards. Improving single leg squat technique w/ better hip abd control today - reports notable improvements when performing following activation work on table. Discussed potential progression strategies for HEP at length w/ pt demo good understanding.    Lisa is progressing well towards his goals.   Pt prognosis is Good.     Pt will continue to benefit from skilled outpatient physical therapy to address the deficits listed in the problem list box on initial evaluation, provide pt/family education and to maximize pt's level of independence in the home and community environment.     Pt's spiritual, cultural and educational needs considered and pt agreeable to plan of care and goals.    Anticipated barriers to physical therapy: Covid-19    Goals:   Short-Term Goals: 2-4 weeks (met)  - The patient will be independent with initial home exercise program.  - The patient will increase strength of hip muscles with MMT grossly to at least 4/5 to improve hip contribution and stability in weight bearing positions  - The patient will demonstrate ability to ambulate community distances with pain <3/10 at worst for improved functional mobility.     Long-Term Goals: 10-12 weeks (progressing, not met)  - Pt to achieve <30% limitation as measured by the FOTO to demonstrate decreased disability.  - The patient will increase strength of hip muscles with MMT  grossly to at least 4+/5 to improve hip contribution and stability in weight bearing positions  - The patient will demonstrate ability to ambulate community distances without pain for improved functional mobility.  - The patient will demonstrate ability to return to modified running and tennis to facilitate return to healthy lifestyle behaviors    Plan     Continue w/ current POC emphasizing hip and core strength, gait retraining, and gradual progression of functional activities as tolerated    Plan of care Certification: 5/28/2020 to 9/25/2020  Outpatient Physical Therapy 1 times weekly for 10 weeks to include the following interventions: Gait Training, Manual Therapy, Moist Heat/ Ice, Neuromuscular Re-ed, Patient Education, Self Care, Therapeutic Activites and Therapeutic Exercise.    Alanis Birmingham PT

## 2020-06-26 ENCOUNTER — CLINICAL SUPPORT (OUTPATIENT)
Dept: REHABILITATION | Facility: HOSPITAL | Age: 35
End: 2020-06-26
Attending: FAMILY MEDICINE
Payer: COMMERCIAL

## 2020-06-26 DIAGNOSIS — Z78.9 DIFFICULTY NAVIGATING STAIRS: ICD-10-CM

## 2020-06-26 DIAGNOSIS — R26.9 GAIT ABNORMALITY: ICD-10-CM

## 2020-06-26 DIAGNOSIS — R29.898 WEAKNESS OF BOTH HIPS: ICD-10-CM

## 2020-06-26 DIAGNOSIS — M25.651 DECREASED RANGE OF MOTION OF BOTH HIPS: ICD-10-CM

## 2020-06-26 DIAGNOSIS — M25.652 DECREASED RANGE OF MOTION OF BOTH HIPS: ICD-10-CM

## 2020-06-26 DIAGNOSIS — M25.659 STIFFNESS OF HIP JOINT, UNSPECIFIED LATERALITY: ICD-10-CM

## 2020-06-26 PROCEDURE — 97140 MANUAL THERAPY 1/> REGIONS: CPT

## 2020-06-26 PROCEDURE — 97110 THERAPEUTIC EXERCISES: CPT

## 2020-06-26 PROCEDURE — 97112 NEUROMUSCULAR REEDUCATION: CPT

## 2020-07-06 ENCOUNTER — CLINICAL SUPPORT (OUTPATIENT)
Dept: REHABILITATION | Facility: HOSPITAL | Age: 35
End: 2020-07-06
Attending: FAMILY MEDICINE
Payer: COMMERCIAL

## 2020-07-06 DIAGNOSIS — R29.898 WEAKNESS OF BOTH HIPS: ICD-10-CM

## 2020-07-06 DIAGNOSIS — M25.652 DECREASED RANGE OF MOTION OF BOTH HIPS: ICD-10-CM

## 2020-07-06 DIAGNOSIS — M25.659 STIFFNESS OF HIP JOINT, UNSPECIFIED LATERALITY: ICD-10-CM

## 2020-07-06 DIAGNOSIS — Z78.9 DIFFICULTY NAVIGATING STAIRS: ICD-10-CM

## 2020-07-06 DIAGNOSIS — M25.651 DECREASED RANGE OF MOTION OF BOTH HIPS: ICD-10-CM

## 2020-07-06 DIAGNOSIS — R26.9 GAIT ABNORMALITY: ICD-10-CM

## 2020-07-06 PROCEDURE — 97140 MANUAL THERAPY 1/> REGIONS: CPT

## 2020-07-06 PROCEDURE — 97112 NEUROMUSCULAR REEDUCATION: CPT

## 2020-07-06 PROCEDURE — 97110 THERAPEUTIC EXERCISES: CPT

## 2020-07-06 NOTE — PROGRESS NOTES
"  Physical Therapy Daily Treatment Note     Name: Lisa Garcia  Clinic Number: [id number]    Therapy Diagnosis:   Encounter Diagnoses   Name Primary?    Stiffness of hip joint, unspecified laterality     Difficulty navigating stairs     Gait abnormality     Weakness of both hips     Decreased range of motion of both hips      Physician: Marlo Galeana, *    Visit Date: 7/6/2020  Physician Orders: PT Eval and Treat  Medical Diagnosis from Referral: M25.551,M25.552 (ICD-10-CM) - Bilateral hip pain M47.816 (ICD-10-CM) - Lumbar spondylosis   Evaluation Date: 5/28/2020  Authorization Period Expiration: 12/31/2020  Plan of Care Expiration: 9/25/2020  Visit # / Visits authorized: 4/30    Time In: 1705  Time Out: 1800  Total Billable Time: 55 minutes    Precautions: Standard    Subjective     Pt reports: that he's doing much better. Has been biking a fair bit and working on hip strengthening exercises.  He was compliant with home exercise program.  Response to previous treatment: no adverse effects  Functional change: improving tolerance to biking/exercise    Pain: 2/10  Location: right hip      Objective     Lisa received therapeutic exercises to develop strength, endurance, ROM, flexibility, posture and core stabilization for 17 minutes including:  - Quadruped rock back x20  - Lateral band walk double GTB 3 laps ea  - Reverse lunge x10 ea  - Lateral lunge x10 ea    Lisa received the following manual therapy techniques: Joint mobilizations were applied to the: bilateral hips for 8 minutes, including:  - Hip inferior/lat glide mob gr II-IV bilat    Lisa participated in neuromuscular re-education activities to improve: Balance, Coordination, Kinesthetic, Sense, Proprioception and Posture for 30 minutes. The following activities were included:  - Sidelying active IR 90/90 x15 (5") ea  - SL mini squat 3x20 ea VC for level pelvis  - SL hip hinge 2x10 VC for hip activation  - SL balance EC 2x30"    Home Exercises " Provided and Patient Education Provided     Education provided:   - Role of PT, PT POC, hip anatomy, tendinopathy mechanism    Written Home Exercises Provided: Patient instructed to cont prior HEP.  Exercises were reviewed and Lisa was able to demonstrate them prior to the end of the session.  Lisa demonstrated good  understanding of the education provided.     See EMR under Patient Instructions for exercises provided prior visit.    Assessment     Continuing to progress nicely. Some improvement in hip IR AROM noted today w/ decreased asymmetry between L and R LE. Hip abd/ER strength improving and incorporating well into CKC tasks w/ reduced symptom reproduction and exercises only being limited by muscular fatigue. Discussed plan to address deficits in R ankle stability/proprioception to prevent continued kinetic chain impact w/ pt agreeable.    Lisa is progressing well towards his goals.   Pt prognosis is Good.     Pt will continue to benefit from skilled outpatient physical therapy to address the deficits listed in the problem list box on initial evaluation, provide pt/family education and to maximize pt's level of independence in the home and community environment.     Pt's spiritual, cultural and educational needs considered and pt agreeable to plan of care and goals.    Anticipated barriers to physical therapy: Covid-19    Goals:   Short-Term Goals: 2-4 weeks (met)  - The patient will be independent with initial home exercise program.  - The patient will increase strength of hip muscles with MMT grossly to at least 4/5 to improve hip contribution and stability in weight bearing positions  - The patient will demonstrate ability to ambulate community distances with pain <3/10 at worst for improved functional mobility.     Long-Term Goals: 10-12 weeks (progressing, not met)  - Pt to achieve <30% limitation as measured by the FOTO to demonstrate decreased disability.  - The patient will increase strength of hip  muscles with MMT grossly to at least 4+/5 to improve hip contribution and stability in weight bearing positions  - The patient will demonstrate ability to ambulate community distances without pain for improved functional mobility.  - The patient will demonstrate ability to return to modified running and tennis to facilitate return to healthy lifestyle behaviors    Plan     Continue w/ current POC emphasizing hip and core strength, gait retraining, and gradual progression of functional activities as tolerated    Plan of care Certification: 5/28/2020 to 9/25/2020  Outpatient Physical Therapy 1 times weekly for 10 weeks to include the following interventions: Gait Training, Manual Therapy, Moist Heat/ Ice, Neuromuscular Re-ed, Patient Education, Self Care, Therapeutic Activites and Therapeutic Exercise.    Alanis Birmingham PT

## 2020-07-17 ENCOUNTER — CLINICAL SUPPORT (OUTPATIENT)
Dept: REHABILITATION | Facility: HOSPITAL | Age: 35
End: 2020-07-17
Attending: FAMILY MEDICINE
Payer: COMMERCIAL

## 2020-07-17 DIAGNOSIS — R26.9 GAIT ABNORMALITY: ICD-10-CM

## 2020-07-17 DIAGNOSIS — Z78.9 DIFFICULTY NAVIGATING STAIRS: ICD-10-CM

## 2020-07-17 DIAGNOSIS — M25.651 DECREASED RANGE OF MOTION OF BOTH HIPS: ICD-10-CM

## 2020-07-17 DIAGNOSIS — M25.652 DECREASED RANGE OF MOTION OF BOTH HIPS: ICD-10-CM

## 2020-07-17 DIAGNOSIS — M25.659 STIFFNESS OF HIP JOINT, UNSPECIFIED LATERALITY: ICD-10-CM

## 2020-07-17 DIAGNOSIS — R29.898 WEAKNESS OF BOTH HIPS: ICD-10-CM

## 2020-07-17 PROCEDURE — 97110 THERAPEUTIC EXERCISES: CPT

## 2020-07-17 NOTE — PROGRESS NOTES
"  Physical Therapy Daily Treatment Note     Name: Lisa Garcia  Clinic Number: [id number]    Therapy Diagnosis:   Encounter Diagnoses   Name Primary?    Stiffness of hip joint, unspecified laterality     Difficulty navigating stairs     Gait abnormality     Weakness of both hips     Decreased range of motion of both hips      Physician: Marlo Galeana, *    Visit Date: 7/17/2020  Physician Orders: PT Eval and Treat  Medical Diagnosis from Referral: M25.551,M25.552 (ICD-10-CM) - Bilateral hip pain M47.816 (ICD-10-CM) - Lumbar spondylosis   Evaluation Date: 5/28/2020  Authorization Period Expiration: 12/31/2020  Plan of Care Expiration: 9/25/2020  Visit # / Visits authorized: 5/30    Time In: 1635  Time Out: 1733  Total Billable Time: 53 minutes    Precautions: Standard    Subjective     Pt reports: hips doing much better. No pain with SL squat now. More issues with lumbar pain with increased biking volume.  He was compliant with home exercise program.  Response to previous treatment: no adverse effects  Functional change: improving tolerance to hip loading    Pain: 2/10  Location: right hip      Objective     Lisa received therapeutic exercises to develop strength, endurance, ROM, flexibility, posture and core stabilization for 53 minutes including:  - Upright bike 5' for hip ROM level 5  - Repeated ext standing/prone x10 ea  - Supine TA set + alt hip ext 2x10 ea (5" hold) TC for TA activation  - Cable Paloff press 2x10 ea (17#)  - Alternating suitcase march 2x10 ea (35-45#) VC for upright posture  - Mod side plank + clam 10x5" ea    Lisa received the following manual therapy techniques: Joint mobilizations were applied to the: bilateral hips for 0 minutes, including:    Lisa participated in neuromuscular re-education activities to improve: Balance, Coordination, Kinesthetic, Sense, Proprioception and Posture for 0 minutes. The following activities were included:    Home Exercises Provided and Patient " Education Provided     Education provided:   - Role of PT, PT POC, hip anatomy, tendinopathy mechanism    Written Home Exercises Provided: Patient instructed to cont prior HEP.  Exercises were reviewed and Lisa was able to demonstrate them prior to the end of the session.  Lisa demonstrated good  understanding of the education provided.     See EMR under Patient Instructions for exercises provided prior visit.    Assessment     Hips doing much better. Improved tolerance to FADIR position and compressive loading of lateral hip. Seemingly w/ increased IR ROM on R. Difficulty w/ LBP symptoms today, greatly improved tolerance to core exercise w/ appropriate modification and cueing. Emphasized increased trunk work w/ appropriate positioning to begin to more thoroughly address LBP symptoms.    Lisa is progressing well towards his goals.   Pt prognosis is Good.     Pt will continue to benefit from skilled outpatient physical therapy to address the deficits listed in the problem list box on initial evaluation, provide pt/family education and to maximize pt's level of independence in the home and community environment.     Pt's spiritual, cultural and educational needs considered and pt agreeable to plan of care and goals.    Anticipated barriers to physical therapy: Covid-19    Goals:   Short-Term Goals: 2-4 weeks (met)  - The patient will be independent with initial home exercise program.  - The patient will increase strength of hip muscles with MMT grossly to at least 4/5 to improve hip contribution and stability in weight bearing positions  - The patient will demonstrate ability to ambulate community distances with pain <3/10 at worst for improved functional mobility.     Long-Term Goals: 10-12 weeks (progressing, not met)  - Pt to achieve <30% limitation as measured by the FOTO to demonstrate decreased disability.  - The patient will increase strength of hip muscles with MMT grossly to at least 4+/5 to improve hip  contribution and stability in weight bearing positions  - The patient will demonstrate ability to ambulate community distances without pain for improved functional mobility.  - The patient will demonstrate ability to return to modified running and tennis to facilitate return to healthy lifestyle behaviors    Plan     Continue w/ current POC emphasizing hip and core strength, gait retraining, and gradual progression of functional activities as tolerated    Plan of care Certification: 5/28/2020 to 9/25/2020  Outpatient Physical Therapy 1 times weekly for 10 weeks to include the following interventions: Gait Training, Manual Therapy, Moist Heat/ Ice, Neuromuscular Re-ed, Patient Education, Self Care, Therapeutic Activites and Therapeutic Exercise.    Alanis Birmingham PT

## 2020-07-28 ENCOUNTER — CLINICAL SUPPORT (OUTPATIENT)
Dept: REHABILITATION | Facility: HOSPITAL | Age: 35
End: 2020-07-28
Attending: FAMILY MEDICINE
Payer: COMMERCIAL

## 2020-07-28 DIAGNOSIS — M25.652 DECREASED RANGE OF MOTION OF BOTH HIPS: ICD-10-CM

## 2020-07-28 DIAGNOSIS — M25.651 DECREASED RANGE OF MOTION OF BOTH HIPS: ICD-10-CM

## 2020-07-28 DIAGNOSIS — Z78.9 DIFFICULTY NAVIGATING STAIRS: ICD-10-CM

## 2020-07-28 DIAGNOSIS — R29.898 WEAKNESS OF BOTH HIPS: ICD-10-CM

## 2020-07-28 DIAGNOSIS — R26.9 GAIT ABNORMALITY: ICD-10-CM

## 2020-07-28 DIAGNOSIS — M25.659 STIFFNESS OF HIP JOINT, UNSPECIFIED LATERALITY: ICD-10-CM

## 2020-07-28 PROCEDURE — 97140 MANUAL THERAPY 1/> REGIONS: CPT

## 2020-07-28 PROCEDURE — 97110 THERAPEUTIC EXERCISES: CPT

## 2020-07-28 NOTE — PROGRESS NOTES
"  Physical Therapy Daily Treatment Note     Name: Lisa Garcia  Clinic Number: [id number]    Therapy Diagnosis:   Encounter Diagnoses   Name Primary?    Stiffness of hip joint, unspecified laterality     Difficulty navigating stairs     Gait abnormality     Weakness of both hips     Decreased range of motion of both hips      Physician: Marlo Galeana, *    Visit Date: 7/28/2020  Physician Orders: PT Eval and Treat  Medical Diagnosis from Referral: M25.551,M25.552 (ICD-10-CM) - Bilateral hip pain M47.816 (ICD-10-CM) - Lumbar spondylosis   Evaluation Date: 5/28/2020  Authorization Period Expiration: 12/31/2020  Plan of Care Expiration: 9/25/2020  Visit # / Visits authorized: 6/30    Time In: 1655  Time Out: 1745  Total Billable Time: 50 minutes    Precautions: Standard    Subjective     Pt reports: hip doing great. Increased LBP over past couple of days but not sure why. Notes new mattress he's been using past few weeks. Seems like extension preference that was previously present has changed some.  He was compliant with home exercise program.  Response to previous treatment: no adverse effects  Functional change: improving tolerance to hip loading    Pain: 6/10 pre, 4/10 post lumbar  Location: right hip      Objective     Lisa received therapeutic exercises to develop strength, endurance, ROM, flexibility, posture and core stabilization for 40 minutes including:  - Supine LTR x10 ea  - Supine SKTC x10 ea  - Seated repeated flexion x10  - Deadbug RS 2x15"  - Lat band walk 3 laps ea GTB  - Supine bridge w/ band 20x5" GTB  - Cable pull through 2x15 (20#)    Lisa received the following manual therapy techniques: Joint mobilizations were applied to the: lumbar spine for 10 minutes, including:  - Prone central PA gr II-III L2-L5    Lisa participated in neuromuscular re-education activities to improve: Balance, Coordination, Kinesthetic, Sense, Proprioception and Posture for 0 minutes. The following " activities were included:    Home Exercises Provided and Patient Education Provided     Education provided:   - Role of PT, PT POC, hip anatomy, tendinopathy mechanism    Written Home Exercises Provided: Patient instructed to cont prior HEP.  Exercises were reviewed and Lisa was able to demonstrate them prior to the end of the session.  Lisa demonstrated good  understanding of the education provided.     See EMR under Patient Instructions for exercises provided prior visit.    Assessment     Hips doing great. Increased LBP on this date consistent w/ new acute inflammatory process w/ some chemical pain presentation. Minimal response to repeated movements biasing either flexion or extension today. Responded well to hip and glute work in addition to trunk stability exercise w/ mild relief of symptoms. Discussed continued progression towards higher intensity trunk and LE loading to progressively restore tolerance of lumbar spine and trunk structures.    Lisa is progressing well towards his goals.   Pt prognosis is Good.     Pt will continue to benefit from skilled outpatient physical therapy to address the deficits listed in the problem list box on initial evaluation, provide pt/family education and to maximize pt's level of independence in the home and community environment.     Pt's spiritual, cultural and educational needs considered and pt agreeable to plan of care and goals.    Anticipated barriers to physical therapy: Covid-19    Goals:   Short-Term Goals: 2-4 weeks (met)  - The patient will be independent with initial home exercise program.  - The patient will increase strength of hip muscles with MMT grossly to at least 4/5 to improve hip contribution and stability in weight bearing positions  - The patient will demonstrate ability to ambulate community distances with pain <3/10 at worst for improved functional mobility.     Long-Term Goals: 10-12 weeks (progressing, not met)  - Pt to achieve <30% limitation  as measured by the FOTO to demonstrate decreased disability.  - The patient will increase strength of hip muscles with MMT grossly to at least 4+/5 to improve hip contribution and stability in weight bearing positions  - The patient will demonstrate ability to ambulate community distances without pain for improved functional mobility.  - The patient will demonstrate ability to return to modified running and tennis to facilitate return to healthy lifestyle behaviors    Plan     Continue w/ current POC emphasizing hip and core strength, gait retraining, and gradual progression of functional activities as tolerated    Plan of care Certification: 5/28/2020 to 9/25/2020  Outpatient Physical Therapy 1 times weekly for 10 weeks to include the following interventions: Gait Training, Manual Therapy, Moist Heat/ Ice, Neuromuscular Re-ed, Patient Education, Self Care, Therapeutic Activites and Therapeutic Exercise.    Alanis Birmingham PT

## 2020-08-06 ENCOUNTER — CLINICAL SUPPORT (OUTPATIENT)
Dept: REHABILITATION | Facility: HOSPITAL | Age: 35
End: 2020-08-06
Attending: FAMILY MEDICINE
Payer: COMMERCIAL

## 2020-08-06 DIAGNOSIS — R26.9 GAIT ABNORMALITY: ICD-10-CM

## 2020-08-06 DIAGNOSIS — Z78.9 DIFFICULTY NAVIGATING STAIRS: ICD-10-CM

## 2020-08-06 DIAGNOSIS — M25.659 STIFFNESS OF HIP JOINT, UNSPECIFIED LATERALITY: ICD-10-CM

## 2020-08-06 DIAGNOSIS — M25.652 DECREASED RANGE OF MOTION OF BOTH HIPS: ICD-10-CM

## 2020-08-06 DIAGNOSIS — R29.898 WEAKNESS OF BOTH HIPS: ICD-10-CM

## 2020-08-06 DIAGNOSIS — M25.651 DECREASED RANGE OF MOTION OF BOTH HIPS: ICD-10-CM

## 2020-08-06 PROCEDURE — 97110 THERAPEUTIC EXERCISES: CPT

## 2020-08-06 PROCEDURE — 97112 NEUROMUSCULAR REEDUCATION: CPT

## 2020-08-06 NOTE — PROGRESS NOTES
"  Physical Therapy Daily Treatment Note     Name: Lisa Garcia  Clinic Number: [id number]    Therapy Diagnosis:   Encounter Diagnoses   Name Primary?    Stiffness of hip joint, unspecified laterality     Difficulty navigating stairs     Gait abnormality     Weakness of both hips     Decreased range of motion of both hips      Physician: Marlo Galeana, *    Visit Date: 8/6/2020  Physician Orders: PT Eval and Treat  Medical Diagnosis from Referral: M25.551,M25.552 (ICD-10-CM) - Bilateral hip pain M47.816 (ICD-10-CM) - Lumbar spondylosis   Evaluation Date: 5/28/2020  Authorization Period Expiration: 12/31/2020  Plan of Care Expiration: 9/25/2020  Visit # / Visits authorized: 7/30    Time In: 1630  Time Out: 1730  Total Billable Time: 53 minutes    Precautions: Standard    Subjective     Pt reports: was doing great until he attempted to run yesterday. Increased ankle/foot and hip pain today. Back doing much better.  He was compliant with home exercise program.  Response to previous treatment: no adverse effects  Functional change: improved LBP and cycling tolerance    Pain: 2/10  Location: right hip      Objective     Lisa received therapeutic exercises to develop strength, endurance, ROM, flexibility, posture and core stabilization for 36 minutes including:  - Supine bridge w/ RS 2x10  - Lat band walk 3 laps ea GTB  - Supine deadbug alt heel tap x10 ea  - KB RDL 3x10 (25#) VC for hip hinge and trunk stiffness  - SL squat 2x15 VC for level pelvis  - SL hip hinge 2x15  - Half kneeling ankle DF mob 20x5"    Lisa received the following manual therapy techniques: Joint mobilizations were applied to the: lumbar spine for 0 minutes, including:    Lisa participated in neuromuscular re-education activities to improve: Balance, Coordination, Kinesthetic, Sense, Proprioception and Posture for 24 minutes. The following activities were included:  - KB RDL 3x10 (25#) VC for hip hinge and trunk stiffness  - SL squat " 2x15 VC for level pelvis  - SL hip hinge 2x15    Home Exercises Provided and Patient Education Provided     Education provided:   - Role of PT, PT POC, hip anatomy, tendinopathy mechanism    Written Home Exercises Provided: Patient instructed to cont prior HEP.  Exercises were reviewed and Lisa was able to demonstrate them prior to the end of the session.  Lisa demonstrated good  understanding of the education provided.     See EMR under Patient Instructions for exercises provided prior visit.    Assessment     Low back doing much better today. Chief complaint of R ankle pain and increased hip symptoms after running 1.5 miles yesterday. Continued to educate patient on gradual activity changes and modifications w/ pt understanding. Continues w/ decreased ligamentous restraint to R ankle inversion and dec balance/proprioception in SL stance. Able to progress trunk and hip loading some today w/ goal of working towards meaningful loading of hinge/deadlift pattern moving forward.    Lisa is progressing well towards his goals.   Pt prognosis is Good.     Pt will continue to benefit from skilled outpatient physical therapy to address the deficits listed in the problem list box on initial evaluation, provide pt/family education and to maximize pt's level of independence in the home and community environment.     Pt's spiritual, cultural and educational needs considered and pt agreeable to plan of care and goals.    Anticipated barriers to physical therapy: Covid-19    Goals:   Short-Term Goals: 2-4 weeks (met)  - The patient will be independent with initial home exercise program.  - The patient will increase strength of hip muscles with MMT grossly to at least 4/5 to improve hip contribution and stability in weight bearing positions  - The patient will demonstrate ability to ambulate community distances with pain <3/10 at worst for improved functional mobility.     Long-Term Goals: 10-12 weeks (progressing, not met)  -  Pt to achieve <30% limitation as measured by the FOTO to demonstrate decreased disability.  - The patient will increase strength of hip muscles with MMT grossly to at least 4+/5 to improve hip contribution and stability in weight bearing positions  - The patient will demonstrate ability to ambulate community distances without pain for improved functional mobility.  - The patient will demonstrate ability to return to modified running and tennis to facilitate return to healthy lifestyle behaviors    Plan     Continue w/ current POC emphasizing hip and core strength, gait retraining, and gradual progression of functional activities as tolerated    Plan of care Certification: 5/28/2020 to 9/25/2020  Outpatient Physical Therapy 1 times weekly for 10 weeks to include the following interventions: Gait Training, Manual Therapy, Moist Heat/ Ice, Neuromuscular Re-ed, Patient Education, Self Care, Therapeutic Activites and Therapeutic Exercise.    Alanis Birmingham, PT

## 2020-08-12 ENCOUNTER — OFFICE VISIT (OUTPATIENT)
Dept: SPORTS MEDICINE | Facility: CLINIC | Age: 35
End: 2020-08-12
Payer: COMMERCIAL

## 2020-08-12 VITALS — TEMPERATURE: 97 F | BODY MASS INDEX: 31.44 KG/M2 | HEIGHT: 74 IN | WEIGHT: 245 LBS

## 2020-08-12 DIAGNOSIS — M25.551 CHRONIC RIGHT HIP PAIN: ICD-10-CM

## 2020-08-12 DIAGNOSIS — M70.61 GREATER TROCHANTERIC BURSITIS, RIGHT: ICD-10-CM

## 2020-08-12 DIAGNOSIS — M54.42 CHRONIC BILATERAL LOW BACK PAIN WITH BILATERAL SCIATICA: Primary | ICD-10-CM

## 2020-08-12 DIAGNOSIS — M54.41 CHRONIC BILATERAL LOW BACK PAIN WITH BILATERAL SCIATICA: Primary | ICD-10-CM

## 2020-08-12 DIAGNOSIS — G89.29 CHRONIC BILATERAL LOW BACK PAIN WITH BILATERAL SCIATICA: Primary | ICD-10-CM

## 2020-08-12 DIAGNOSIS — G89.29 CHRONIC RIGHT HIP PAIN: ICD-10-CM

## 2020-08-12 PROCEDURE — 99999 PR PBB SHADOW E&M-EST. PATIENT-LVL III: ICD-10-PCS | Mod: PBBFAC,,, | Performed by: FAMILY MEDICINE

## 2020-08-12 PROCEDURE — 99999 PR PBB SHADOW E&M-EST. PATIENT-LVL III: CPT | Mod: PBBFAC,,, | Performed by: FAMILY MEDICINE

## 2020-08-12 PROCEDURE — 99214 OFFICE O/P EST MOD 30 MIN: CPT | Mod: 25,S$GLB,, | Performed by: FAMILY MEDICINE

## 2020-08-12 PROCEDURE — 3008F BODY MASS INDEX DOCD: CPT | Mod: CPTII,S$GLB,, | Performed by: FAMILY MEDICINE

## 2020-08-12 PROCEDURE — 20611 LARGE JOINT ASPIRATION/INJECTION: R GREATER TROCHANTERIC BURSA: ICD-10-PCS | Mod: RT,S$GLB,, | Performed by: FAMILY MEDICINE

## 2020-08-12 PROCEDURE — 20611 DRAIN/INJ JOINT/BURSA W/US: CPT | Mod: RT,S$GLB,, | Performed by: FAMILY MEDICINE

## 2020-08-12 PROCEDURE — 99214 PR OFFICE/OUTPT VISIT, EST, LEVL IV, 30-39 MIN: ICD-10-PCS | Mod: 25,S$GLB,, | Performed by: FAMILY MEDICINE

## 2020-08-12 PROCEDURE — 3008F PR BODY MASS INDEX (BMI) DOCUMENTED: ICD-10-PCS | Mod: CPTII,S$GLB,, | Performed by: FAMILY MEDICINE

## 2020-08-12 RX ORDER — TRIAMCINOLONE ACETONIDE 40 MG/ML
40 INJECTION, SUSPENSION INTRA-ARTICULAR; INTRAMUSCULAR
Status: DISCONTINUED | OUTPATIENT
Start: 2020-08-12 | End: 2020-08-12 | Stop reason: HOSPADM

## 2020-08-12 RX ORDER — MELOXICAM 7.5 MG/1
7.5 TABLET ORAL DAILY
Qty: 14 TABLET | Refills: 0 | Status: SHIPPED | OUTPATIENT
Start: 2020-08-12 | End: 2020-09-28 | Stop reason: SDUPTHER

## 2020-08-12 RX ADMIN — TRIAMCINOLONE ACETONIDE 40 MG: 40 INJECTION, SUSPENSION INTRA-ARTICULAR; INTRAMUSCULAR at 08:08

## 2020-08-12 NOTE — PROGRESS NOTES
Lisa Garcia, a 35 y.o. male, is here for evaluation of Vaibhav hip pain. Pt. reports that his hip pain has resolved at this time. He says that he is doing 90% better today. He does report spontaneous LBP.       HISTORY OF PRESENT ILLNESS   Location: #1 lateral/anterior thigh, Left = Right    #2 lumbar spine  Onset: Chronic since 2018  Palliative:    Relative rest   Oral analgesics - Meloxicam (7.5mg) with moderate relief   Healthy back fPT for lumbar spine/hip   fPT, @[facility] date: 05.28.2020, duration: 7 visits, significant Improvement with hip pain, continues to have LBP  Provocative:   ADLs  prolonged sitting  prolonged ambulation   Prior: No hx of Orthopaedic surgery  Progression: worsening discomfort   Quality:    sharp  Radiation: Bilateral LE numbness/tingling when inflammed  Severity: per nursing documentation  Timing: intermittent with use  Trauma: none specific      Review of systems (ROS):  A 10+ review of systems was performed with pertinent positives and negatives noted above in the history of present illness. Other systems were negative unless otherwise specified.      PHYSICAL EXAMINATION  General:  The patient is alert and oriented x 3.  Mood is pleasant.  Observation of ears, eyes and nose reveal no gross abnormalities.  HEENT: NCAT, sclera nonicteric  Lungs: Respirations are equal and unlabored.   Gait is coordinated. Patient can toe walk and heel walk without difficulty.    HIP/PELVIS EXAMINATION    Observation/Inspection  Gait:   Nonantalgic   Alignment:  Neutral   Scars:   None   Muscle atrophy: None   Effusion:  None   Warmth:  None   Discoloration:   None   Leg lengths:   Equal   Pelvis:    Level     Tenderness/Crepitus (T/C):      T / C  Trochanteric bursa   ++ / -  Piriformis    - / -  SI joint    - / -  Psoas tendon   - / -  Rectus insertion  - / -  Adductor insertion  - / -  Pubic symphysis  - / -    ROM: (* = pain)    Flexion:      120 degrees*  External rotation:   40  degrees  Internal rotation with axial load:  10 degrees*  Internal rotation without axial load:  10 degrees*  Abduction:    45 degrees  Adduction:     20 degrees    Special Tests:  Pain w/ forced internal rotation (FADIR):  ++*   Pain w/ forced external rotation (PREETI):  -   Circumduction test:     -  Stinchfield test:     -   Log roll:       ++   Snapping hip (internal):    -   Sit-up pain:      +*   Resisted sit-up pain:     +*   Resisted sit-up with adductor contraction pain:  -   Step-down test:     +  Trendelenburg test:     +  Bridge test      +     Extremity Neuro-vascular Examination:   Sensation:  Grossly intact to light touch all dermatomal regions.   Motor Function:  Fully intact motor function at hip, knee, foot and ankle    DTRs;  quadriceps and  achilles 2+.  No clonus and downgoing Babinski.    Vascular status:  DP and PT pulses 2+, brisk capillary refill, symmetric.    Skin:  intact, compartments soft.    Other Findings:    ASSESSMENT & PLAN  Assessment  #1 recalcitrant low back pain, failing conservative txs   W/ lumbar spondylosis, polo L4/L5  #2 chronic hip pain, right > left   W/ GTBursitis, right > left    No evidence of vascular pathology    Imaging studies reviewed:   X-ray lumbar spine 19.08  X-ray hip, kev 20.05    Plan  #1  Given extreme dysfunction and discomfort, coupled with history and physical examination suggesting lumbar disc pathology, and with non-diagnostic x-ray imaging, we will obtain MRI imaging for further evaluation of the soft tissue structures of the lumbar spine.     #2  We discussed the importance of appropriate diet, weight, and regular exercise    We discussed options including    Watchful waiting / relative rest    Physical therapy x   Injection therapy csi gtb r   Consultation    The patient chooses As above   x = prescribed  CSI = corticosteroid injection  VSI = viscosupplement injection  PRPI = platelet rich plasma injection  ia = intra articular  R = right  L =  left  B = bilateral   nfSx = surgical consultation was recommended, but patient is not interested in consultation at this time    Physical Therapy        Formal (fPT), @ [facility] facility t   Formal (fPT), @ [facility] facility        Homegoing (hgPT), per concurrent fPT recommendations t   Homegoing (hgPT), per prior fPT recommendations    Homegoing (hgPT), handout provided        w/  (atPT)    [blank] = not prescribed  x = prescribed  b = prescribed, and begin as indicated  t = continue as indicated  r = prescribed, and restart as indicated  p = completed prior as indicated  hs = prescribed, and with high school   col = prescribed, and with college or university   nfPT = physical therapy was recommended, but patient is not interested in PT at this time    Activity (e.g. sports, work) restrictions    [blank] = as tolerated  pt = per physical therapist  at = per     Bracing    [blank] = not prescribed  r = recommended, but not fit with at todays visit  f = prescribed and fit with at todays visit  t = continue as indicated  p = prn use on rare, as-needed basis; advised against chronic use    Pain management m   [blank] = No prescription necessary. A handout detailing dosing of appropriate   over-the-counter musculoskeletal analgesics was made available to the patient.   m = meloxicam x 14 days  mp = 14 day course of meloxicam prescribed prior    Follow up mri   [blank] = as needed  [number] = in [number] weeks  CSI = for corticosteroid injection  VSI = for viscosupplement injection or injection series  PRP = for platelet rich plasma injection or injection series  MRI = after MRI imaging  ns = should surgical options be deferred (no surgery)  o = appointment offered, deferred by patient    Should symptoms worsen or fail to resolve, consider    Revisiting the above options and / or        Vocation:    at [employer], works at [employer]  cycling, running

## 2020-08-12 NOTE — PROCEDURES
"Large Joint Aspiration/Injection: R greater trochanteric bursa    Date/Time: 8/12/2020 8:00 AM  Performed by: Marlo Galeana MD  Authorized by: Marlo Galeana MD     Consent Done?:  Yes (Verbal)  Indications:  Pain  Site marked: the procedure site was marked    Timeout: prior to procedure the correct patient, procedure, and site was verified    Prep: patient was prepped and draped in usual sterile fashion    Local anesthesia used?: No      Details:  Needle Size:  22 G  Ultrasonic Guidance for needle placement?: Yes    Images are saved and documented.  Approach:  Lateral  Location:  Hip  Site:  R greater trochanteric bursa  Medications:  40 mg triamcinolone acetonide 40 mg/mL  Patient tolerance:  Patient tolerated the procedure well with no immediate complications     Description of ultrasound utilization for needle guidance:   Ultrasound guidance used for needle localization. Images saved and stored for documentation. The greater trochanter and its bursa were visualized. Dynamic visualization of the 22g x 3.5" needle was continuous throughout the procedure.       "

## 2020-08-14 ENCOUNTER — TELEPHONE (OUTPATIENT)
Dept: REHABILITATION | Facility: HOSPITAL | Age: 35
End: 2020-08-14

## 2020-08-14 NOTE — TELEPHONE ENCOUNTER
Spoke with patient about lumbar symptom management strategies while on trip in [place] per request of Federico. States that most recent symptom flare occurred a few days ago but that CSI seems to have helped some. Scheduled for MRI Monday. Advised for lumbar roll while driving and frequency positional changes w/ pt agreeable. Will see in clinic again Monday evening to continue w/ current EOC.    Alanis Birmingham, PT, DPT

## 2020-08-17 ENCOUNTER — HOSPITAL ENCOUNTER (OUTPATIENT)
Dept: RADIOLOGY | Facility: HOSPITAL | Age: 35
Discharge: HOME | End: 2020-08-17
Attending: FAMILY MEDICINE
Payer: COMMERCIAL

## 2020-08-17 DIAGNOSIS — M54.41 CHRONIC BILATERAL LOW BACK PAIN WITH BILATERAL SCIATICA: ICD-10-CM

## 2020-08-17 DIAGNOSIS — M54.42 CHRONIC BILATERAL LOW BACK PAIN WITH BILATERAL SCIATICA: ICD-10-CM

## 2020-08-17 DIAGNOSIS — G89.29 CHRONIC BILATERAL LOW BACK PAIN WITH BILATERAL SCIATICA: ICD-10-CM

## 2020-08-17 PROCEDURE — 72148 MRI LUMBAR SPINE W/O DYE: CPT | Mod: TC

## 2020-08-17 PROCEDURE — 72148 MRI LUMBAR SPINE WITHOUT CONTRAST: ICD-10-PCS | Mod: 26,,, | Performed by: RADIOLOGY

## 2020-08-17 PROCEDURE — 72148 MRI LUMBAR SPINE W/O DYE: CPT | Mod: 26,,, | Performed by: RADIOLOGY

## 2020-08-18 ENCOUNTER — TELEPHONE (OUTPATIENT)
Dept: SPORTS MEDICINE | Facility: CLINIC | Age: 35
End: 2020-08-18

## 2020-08-18 NOTE — TELEPHONE ENCOUNTER
discussed MRI results and plan after discussing with Dr. Galeana.    P:  #1 referral to back and spine for further evaluation.    Federico Meyer   Orthopaedic Clinical Assistant to Dr. Marlo Galeana  [facility]

## 2020-08-21 ENCOUNTER — OFFICE VISIT (OUTPATIENT)
Dept: ORTHOPEDICS | Facility: CLINIC | Age: 35
End: 2020-08-21
Payer: COMMERCIAL

## 2020-08-21 ENCOUNTER — HOSPITAL ENCOUNTER (OUTPATIENT)
Dept: RADIOLOGY | Facility: HOSPITAL | Age: 35
Discharge: HOME | End: 2020-08-21
Attending: ORTHOPAEDIC SURGERY
Payer: COMMERCIAL

## 2020-08-21 VITALS — HEIGHT: 74 IN | BODY MASS INDEX: 29 KG/M2 | WEIGHT: 226 LBS

## 2020-08-21 DIAGNOSIS — M51.36 DEGENERATIVE DISC DISEASE, LUMBAR: Primary | ICD-10-CM

## 2020-08-21 DIAGNOSIS — M51.36 DDD (DEGENERATIVE DISC DISEASE), LUMBAR: ICD-10-CM

## 2020-08-21 PROCEDURE — 3008F PR BODY MASS INDEX (BMI) DOCUMENTED: ICD-10-PCS | Mod: CPTII,S$GLB,, | Performed by: ORTHOPAEDIC SURGERY

## 2020-08-21 PROCEDURE — 3008F BODY MASS INDEX DOCD: CPT | Mod: CPTII,S$GLB,, | Performed by: ORTHOPAEDIC SURGERY

## 2020-08-21 PROCEDURE — 72100 XR LUMBAR SPINE AP AND LAT WITH FLEX/EXT: ICD-10-PCS | Mod: 26,,, | Performed by: RADIOLOGY

## 2020-08-21 PROCEDURE — 99999 PR PBB SHADOW E&M-EST. PATIENT-LVL III: CPT | Mod: PBBFAC,,, | Performed by: ORTHOPAEDIC SURGERY

## 2020-08-21 PROCEDURE — 72120 XR LUMBAR SPINE AP AND LAT WITH FLEX/EXT: ICD-10-PCS | Mod: 26,,, | Performed by: RADIOLOGY

## 2020-08-21 PROCEDURE — 72100 X-RAY EXAM L-S SPINE 2/3 VWS: CPT | Mod: 26,,, | Performed by: RADIOLOGY

## 2020-08-21 PROCEDURE — 72120 X-RAY BEND ONLY L-S SPINE: CPT | Mod: TC

## 2020-08-21 PROCEDURE — 72120 X-RAY BEND ONLY L-S SPINE: CPT | Mod: 26,,, | Performed by: RADIOLOGY

## 2020-08-21 PROCEDURE — 99213 PR OFFICE/OUTPT VISIT, EST, LEVL III, 20-29 MIN: ICD-10-PCS | Mod: S$GLB,,, | Performed by: ORTHOPAEDIC SURGERY

## 2020-08-21 PROCEDURE — 99999 PR PBB SHADOW E&M-EST. PATIENT-LVL III: ICD-10-PCS | Mod: PBBFAC,,, | Performed by: ORTHOPAEDIC SURGERY

## 2020-08-21 PROCEDURE — 99213 OFFICE O/P EST LOW 20 MIN: CPT | Mod: S$GLB,,, | Performed by: ORTHOPAEDIC SURGERY

## 2020-08-21 NOTE — PROGRESS NOTES
DATE: 8/21/2020  PATIENT: Lisa Garcia    Attending Physician: Raymon Little M.D.    CHIEF COMPLAINT: LBP    HISTORY:  Lisa Garcia is a 35 y.o. male  here for initial evaluation of progressively worsening low back pain (Back - 2). The pain has been present for several years. The patient describes the pain as vague achy low back pain with intermittent episodes of severe pain that goes from his low back to his bilateral buttocks and up his back.  The pain is worse with standing, bending, lifting heavy objects and improved by standing. There is intermittent associated numbness and tingling down right leg. There is no subjective weakness. Prior treatments have included IM CSI, meloxicam, healthy back program, but no MITCH.    The Patient denies myelopathic symptoms such as handwriting changes or difficulty with buttons/coins/keys. Denies perineal paresthesias, bowel/bladder dysfunction.    PAST MEDICAL/SURGICAL HISTORY:  Past Medical History:   Diagnosis Date    Alopecia of scalp 2009    Medical history non-contributory      Past Surgical History:   Procedure Laterality Date    APPENDECTOMY         Current Medications:   Current Outpatient Medications:     clindamycin (CLEOCIN T) 1 % external solution, Apply topically 2 (two) times daily., Disp: 1 Bottle, Rfl: 3    meloxicam (MOBIC) 7.5 MG tablet, Take 1 tablet (7.5 mg total) by mouth once daily., Disp: 14 tablet, Rfl: 0    ibuprofen (ADVIL,MOTRIN) 200 MG tablet, Take 200 mg by mouth every 6 (six) hours as needed for Pain., Disp: , Rfl:     Social History:   Social History     Socioeconomic History    Marital status: Significant Other     Spouse name: Not on file    Number of children: 0    Years of education: Not on file    Highest education level: Not on file   Occupational History    Occupation:    Social Needs    Financial resource strain: Not on file    Food insecurity     Worry: Not on file     Inability: Not on file     "Transportation needs     Medical: Not on file     Non-medical: Not on file   Tobacco Use    Smoking status: Current Some Day Smoker     Types: Cigars    Smokeless tobacco: Never Used   Substance and Sexual Activity    Alcohol use: Yes     Alcohol/week: 0.0 standard drinks     Comment: social     Drug use: No    Sexual activity: Yes     Partners: Female   Lifestyle    Physical activity     Days per week: Not on file     Minutes per session: Not on file    Stress: To some extent   Relationships    Social connections     Talks on phone: Not on file     Gets together: Not on file     Attends Christianity service: Not on file     Active member of club or organization: Not on file     Attends meetings of clubs or organizations: Not on file     Relationship status: Not on file   Other Topics Concern    Not on file   Social History Narrative    Not on file       REVIEW OF SYSTEMS:  Constitution: Negative. Negative for chills, fever and night sweats.   Cardiovascular: Negative for chest pain and syncope.   Respiratory: Negative for cough and shortness of breath.   Gastrointestinal: See HPI. Negative for nausea/vomiting. Negative for abdominal pain.  Genitourinary: See HPI. Negative for discoloration or dysuria.  Hematologic/Lymphatic: negative for bleeding/clotting disorders.   Musculoskeletal: Negative for falls and muscle weakness.   Neurological: See HPI. no history of seizures. no history of cranial surgery or shunts.  Neurological: See HPI. No seizures.   Endocrine: Negative for polydipsia, polyphagia and polyuria.   Allergic/Immunologic: Negative for hives and persistent infections.     EXAM:  Ht 6' 2" (1.88 m)   Wt 102.5 kg (225 lb 15.5 oz)   BMI 29.01 kg/m²     PHYSICAL EXAMINATION:    General: The patient is a  35 y.o. male in no apparent distress, the patient is orientatied to person, place and time.  Psych: Normal mood and affect  HEENT: Vision grossly intact, hearing intact to the spoken word.  Lungs: " Respirations unlabored.  Gait: Normal station and gait, no difficulty with toe or heel walk.   Skin: Dorsal lumbar skin negative for rashes, lesions, hairy patches and surgical scars. There is paraspinal lumbar tenderness to palpation.  Range of motion: Lumbar range of motion is acceptable.  Spinal Balance: Global saggital and coronal spinal balance acceptable, no significant for scoliosis and kyphosis.  Musculoskeletal: No pain with the range of motion of the bilateral hips. No trochanteric tenderness to palpation.  Vascular: Bilateral lower extremities warm and well perfused, Dorsalis pedis pulses 2+ bilaterally.  Neurological: Normal strength and tone in all major motor groups in the bilateral lower extremities. Normal sensation to light touch in the L2-S1 dermatomes bilaterally.  Deep tendon reflexes symmetric 2+ in the bilateral lower extremities.  Negative Babinski bilaterally. Straight leg raise negative bilaterally.    IMAGING:      Today I personally reviewed AP, Lat and Flex/Ex  upright L-spine that demonstrate lumbar spondylosis with disc space narrowing most severe L4/L5 and L5/S1      MRI with endplate changes L4/5    Body mass index is 29.01 kg/m².  No results found for: HGBA1C    ASSESSMENT/PLAN:    Diagnoses and all orders for this visit:    Degenerative disc disease, lumbar      Today we discussed at length all of the different treatment options including anti-inflammatories, acetaminophen, rest, ice, heat, physical therapy including strengthening and stretching exercises, home exercises, ROM, aerobic conditioning, aqua therapy, other modalities including ultrasound, massage, and dry needling, epidural steroid injections and finally surgical intervention.

## 2020-08-21 NOTE — LETTER
August 24, 2020      aMrlo Galeana MD  [address]  [address]  [facility] [address]           [id number]  [address]  [place] [address]  Phone: [phone]          Patient: Lisa Garcia   MR Number: [id number]   YOB: 1985   Date of Visit: 8/21/2020       Dear Dr. Marlo Galeana:    Thank you for referring Lisa Garcia to me for evaluation. Attached you will find relevant portions of my assessment and plan of care.    If you have questions, please do not hesitate to call me. I look forward to following Lisa Garcia along with you.    Sincerely,    Raymon Little MD    Enclosure  CC:  No Recipients    If you would like to receive this communication electronically, please contact [email or web address] or [phone] to request more information on [employer] access.    For providers and/or their staff who would like to refer a patient to [facility], please contact us through our one-stop-shop provider referral line, [facility], at [phone].    If you feel you have received this communication in error or would no longer like to receive these types of communications, please e-mail [email or web address]

## 2020-08-26 NOTE — PROGRESS NOTES
"  Physical Therapy Daily Treatment Note     Name: Lisa Garcia  Clinic Number: [id number]    Therapy Diagnosis:   Encounter Diagnoses   Name Primary?    Stiffness of hip joint, unspecified laterality     Difficulty navigating stairs     Gait abnormality     Weakness of both hips     Decreased range of motion of both hips      Physician: Marlo Galeana, *    Visit Date: 8/27/2020  Physician Orders: PT Eval and Treat  Medical Diagnosis from Referral: M25.551,M25.552 (ICD-10-CM) - Bilateral hip pain M47.816 (ICD-10-CM) - Lumbar spondylosis   Evaluation Date: 5/28/2020  Authorization Period Expiration: 12/31/2020  Plan of Care Expiration: 9/25/2020  Visit # / Visits authorized: 8/30    Time In: 1500  Time Out: 1550  Total Billable Time: 47 minutes    Precautions: Standard    Subjective     Pt reports: doing much better at this time. Feeling better after appt w/ Dr. Little and getting lumbar MRI.  He was compliant with home exercise program.  Response to previous treatment: no adverse effects  Functional change: improved lumbar symptoms    Pain: 1/10  Location: right hip      Objective     Lisa received therapeutic exercises to develop strength, endurance, ROM, flexibility, posture and core stabilization for 20 minutes including:  - Supine B stance bridge 2x10 (5")  - Mod side plank 2x30" ea  - Mod front plank 2x30" ea    Lisa received the following manual therapy techniques: Joint mobilizations were applied to the: hips for 10 minutes, including:  - Hip inferior glide mob gr IV both sides    Lisa participated in neuromuscular re-education activities to improve: Balance, Coordination, Kinesthetic, Sense, Proprioception and Posture for 20 minutes. The following activities were included:  - Supine deadbug alt LE extension 2x10 ea  - Bird dog alt LE ext 2x10 ea TC for excessive lumbar ext/rot    Home Exercises Provided and Patient Education Provided     Education provided:   - Role of PT, PT POC, hip " anatomy, tendinopathy mechanism    Written Home Exercises Provided: Patient instructed to cont prior HEP.  Exercises were reviewed and Lisa was able to demonstrate them prior to the end of the session.  Lisa demonstrated good  understanding of the education provided.     See EMR under Patient Instructions for exercises provided prior visit.    Assessment     Better at this time. Continued discussion of lumbar symptom provocation and management with flares w/ pt demo improved understanding. Continues w/ deficits in deep core mm endurance and firing patterns, requires some cueing today for positioning and control. Sig difficulty w/ plank variations today w/ reduced endurance and tendency to fall into lumbar extension. Emphasized incorporation of new exercises in addition to previous hip exercise.    Lisa is progressing well towards his goals.   Pt prognosis is Good.     Pt will continue to benefit from skilled outpatient physical therapy to address the deficits listed in the problem list box on initial evaluation, provide pt/family education and to maximize pt's level of independence in the home and community environment.     Pt's spiritual, cultural and educational needs considered and pt agreeable to plan of care and goals.    Anticipated barriers to physical therapy: Covid-19    Goals:   Short-Term Goals: 2-4 weeks (met)  - The patient will be independent with initial home exercise program.  - The patient will increase strength of hip muscles with MMT grossly to at least 4/5 to improve hip contribution and stability in weight bearing positions  - The patient will demonstrate ability to ambulate community distances with pain <3/10 at worst for improved functional mobility.     Long-Term Goals: 10-12 weeks (progressing, not met)  - Pt to achieve <30% limitation as measured by the FOTO to demonstrate decreased disability.  - The patient will increase strength of hip muscles with MMT grossly to at least 4+/5 to  improve hip contribution and stability in weight bearing positions  - The patient will demonstrate ability to ambulate community distances without pain for improved functional mobility.  - The patient will demonstrate ability to return to modified running and tennis to facilitate return to healthy lifestyle behaviors    Plan     Continue w/ current POC emphasizing hip and core strength, gait retraining, and gradual progression of functional activities as tolerated    Plan of care Certification: 5/28/2020 to 9/25/2020  Outpatient Physical Therapy 1 times weekly for 10 weeks to include the following interventions: Gait Training, Manual Therapy, Moist Heat/ Ice, Neuromuscular Re-ed, Patient Education, Self Care, Therapeutic Activites and Therapeutic Exercise.    Alanis Birmingham PT

## 2020-08-27 ENCOUNTER — CLINICAL SUPPORT (OUTPATIENT)
Dept: REHABILITATION | Facility: HOSPITAL | Age: 35
End: 2020-08-27
Attending: FAMILY MEDICINE
Payer: COMMERCIAL

## 2020-08-27 DIAGNOSIS — M25.651 DECREASED RANGE OF MOTION OF BOTH HIPS: ICD-10-CM

## 2020-08-27 DIAGNOSIS — R26.9 GAIT ABNORMALITY: ICD-10-CM

## 2020-08-27 DIAGNOSIS — Z78.9 DIFFICULTY NAVIGATING STAIRS: ICD-10-CM

## 2020-08-27 DIAGNOSIS — M25.659 STIFFNESS OF HIP JOINT, UNSPECIFIED LATERALITY: ICD-10-CM

## 2020-08-27 DIAGNOSIS — R29.898 WEAKNESS OF BOTH HIPS: ICD-10-CM

## 2020-08-27 DIAGNOSIS — M25.652 DECREASED RANGE OF MOTION OF BOTH HIPS: ICD-10-CM

## 2020-08-27 PROCEDURE — 97110 THERAPEUTIC EXERCISES: CPT

## 2020-08-27 PROCEDURE — 97112 NEUROMUSCULAR REEDUCATION: CPT

## 2020-08-27 PROCEDURE — 97140 MANUAL THERAPY 1/> REGIONS: CPT

## 2020-09-03 ENCOUNTER — CLINICAL SUPPORT (OUTPATIENT)
Dept: REHABILITATION | Facility: HOSPITAL | Age: 35
End: 2020-09-03
Attending: FAMILY MEDICINE
Payer: COMMERCIAL

## 2020-09-03 DIAGNOSIS — M25.651 DECREASED RANGE OF MOTION OF BOTH HIPS: ICD-10-CM

## 2020-09-03 DIAGNOSIS — R26.9 GAIT ABNORMALITY: ICD-10-CM

## 2020-09-03 DIAGNOSIS — Z78.9 DIFFICULTY NAVIGATING STAIRS: ICD-10-CM

## 2020-09-03 DIAGNOSIS — M25.659 STIFFNESS OF HIP JOINT, UNSPECIFIED LATERALITY: ICD-10-CM

## 2020-09-03 DIAGNOSIS — M25.652 DECREASED RANGE OF MOTION OF BOTH HIPS: ICD-10-CM

## 2020-09-03 DIAGNOSIS — R29.898 WEAKNESS OF BOTH HIPS: ICD-10-CM

## 2020-09-03 PROCEDURE — 97110 THERAPEUTIC EXERCISES: CPT

## 2020-09-03 PROCEDURE — 97140 MANUAL THERAPY 1/> REGIONS: CPT

## 2020-09-03 PROCEDURE — 97112 NEUROMUSCULAR REEDUCATION: CPT

## 2020-09-03 NOTE — PROGRESS NOTES
"  Physical Therapy Daily Treatment Note     Name: Lisa Garcia  Clinic Number: [id number]    Therapy Diagnosis:   Encounter Diagnoses   Name Primary?    Stiffness of hip joint, unspecified laterality     Difficulty navigating stairs     Gait abnormality     Weakness of both hips     Decreased range of motion of both hips      Physician: Marlo Galeana, *    Visit Date: 9/3/2020  Physician Orders: PT Eval and Treat  Medical Diagnosis from Referral: M25.551,M25.552 (ICD-10-CM) - Bilateral hip pain M47.816 (ICD-10-CM) - Lumbar spondylosis   Evaluation Date: 5/28/2020  Authorization Period Expiration: 12/31/2020  Plan of Care Expiration: 9/25/2020  Visit # / Visits authorized: 9/30    Time In: 1717  Time Out: 1810  Total Billable Time: 53 minutes    Precautions: Standard    Subjective     Pt reports: doing well. No flares over past week.  He was compliant with home exercise program.  Response to previous treatment: no adverse effects  Functional change: improved lumbar symptoms    Pain: 1/10  Location: right hip      Objective     Lisa received therapeutic exercises to develop strength, endurance, ROM, flexibility, posture and core stabilization for 12 minutes including:  - Mod side plank 3x30" ea  - Mod front plank 3x30" ea  - Elevated hip thrust 2x10 (5") VC for glute activation    Lisa received the following manual therapy techniques: Joint mobilizations were applied to the: hips for 15 minutes, including:  - Hip inferolateral glide mob gr IV both sides  - MWM hip IR x10 ea    Lisa participated in neuromuscular re-education activities to improve: Balance, Coordination, Kinesthetic, Sense, Proprioception and Posture for 28 minutes. The following activities were included:  - Standing hip hike 10x10" ea  - Swiss ball on wall hip hike 10x5"  - Sidelying clam isometric 5x30" ea PTB    Home Exercises Provided and Patient Education Provided     Education provided:   - Role of PT, PT POC, hip anatomy, " tendinopathy mechanism    Written Home Exercises Provided: Patient instructed to cont prior HEP.  Exercises were reviewed and Lisa was able to demonstrate them prior to the end of the session.  Lisa demonstrated good  understanding of the education provided.     See EMR under Patient Instructions for exercises provided prior visit.    Assessment     Doing well. Improved symptoms today. Notable difficulty w/ isometric loading of lateral hip mm w/ mild inc in symptoms after. Good understanding of education on monitoring following tendon loading to determine progression of loading course. Improving pelvic control in SL stance, still w/ greater difficulty on R > L.    Lisa is progressing well towards his goals.   Pt prognosis is Good.     Pt will continue to benefit from skilled outpatient physical therapy to address the deficits listed in the problem list box on initial evaluation, provide pt/family education and to maximize pt's level of independence in the home and community environment.     Pt's spiritual, cultural and educational needs considered and pt agreeable to plan of care and goals.    Anticipated barriers to physical therapy: Covid-19    Goals:   Short-Term Goals: 2-4 weeks (met)  - The patient will be independent with initial home exercise program.  - The patient will increase strength of hip muscles with MMT grossly to at least 4/5 to improve hip contribution and stability in weight bearing positions  - The patient will demonstrate ability to ambulate community distances with pain <3/10 at worst for improved functional mobility.     Long-Term Goals: 10-12 weeks (progressing, not met)  - Pt to achieve <30% limitation as measured by the FOTO to demonstrate decreased disability.  - The patient will increase strength of hip muscles with MMT grossly to at least 4+/5 to improve hip contribution and stability in weight bearing positions  - The patient will demonstrate ability to ambulate community distances  without pain for improved functional mobility.  - The patient will demonstrate ability to return to modified running and tennis to facilitate return to healthy lifestyle behaviors    Plan     Continue w/ current POC emphasizing hip and core strength, gait retraining, and gradual progression of functional activities as tolerated    Plan of care Certification: 5/28/2020 to 9/25/2020  Outpatient Physical Therapy 1 times weekly for 10 weeks to include the following interventions: Gait Training, Manual Therapy, Moist Heat/ Ice, Neuromuscular Re-ed, Patient Education, Self Care, Therapeutic Activites and Therapeutic Exercise.    Alanis Birmingham PT

## 2020-09-25 ENCOUNTER — PATIENT MESSAGE (OUTPATIENT)
Dept: REHABILITATION | Facility: HOSPITAL | Age: 35
End: 2020-09-25

## 2020-09-25 ENCOUNTER — CLINICAL SUPPORT (OUTPATIENT)
Dept: REHABILITATION | Facility: HOSPITAL | Age: 35
End: 2020-09-25
Attending: FAMILY MEDICINE
Payer: COMMERCIAL

## 2020-09-25 DIAGNOSIS — Z78.9 DIFFICULTY NAVIGATING STAIRS: ICD-10-CM

## 2020-09-25 DIAGNOSIS — M25.652 DECREASED RANGE OF MOTION OF BOTH HIPS: ICD-10-CM

## 2020-09-25 DIAGNOSIS — R29.898 WEAKNESS OF BOTH HIPS: ICD-10-CM

## 2020-09-25 DIAGNOSIS — M25.659 STIFFNESS OF HIP JOINT, UNSPECIFIED LATERALITY: ICD-10-CM

## 2020-09-25 DIAGNOSIS — M25.651 DECREASED RANGE OF MOTION OF BOTH HIPS: ICD-10-CM

## 2020-09-25 DIAGNOSIS — R26.9 GAIT ABNORMALITY: ICD-10-CM

## 2020-09-25 PROCEDURE — 97112 NEUROMUSCULAR REEDUCATION: CPT

## 2020-09-25 PROCEDURE — 97140 MANUAL THERAPY 1/> REGIONS: CPT

## 2020-09-25 PROCEDURE — 97110 THERAPEUTIC EXERCISES: CPT

## 2020-09-25 NOTE — PROGRESS NOTES
"  Physical Therapy Daily Treatment Note     Name: Lisa Garcia  Clinic Number: [id number]    Therapy Diagnosis:   Encounter Diagnoses   Name Primary?    Stiffness of hip joint, unspecified laterality     Difficulty navigating stairs     Gait abnormality     Weakness of both hips     Decreased range of motion of both hips      Physician: Marlo Galeana, *    Visit Date: 9/25/2020  Physician Orders: PT Eval and Treat  Medical Diagnosis from Referral: M25.551,M25.552 (ICD-10-CM) - Bilateral hip pain M47.816 (ICD-10-CM) - Lumbar spondylosis   Evaluation Date: 5/28/2020  Authorization Period Expiration: 12/31/2020  Plan of Care Expiration: 9/25/2020  Visit # / Visits authorized: 10/30    Time In: 1441 (pt late arrival)  Time Out: 1540  Total Billable Time: 59 minutes    Precautions: Standard    Subjective     Pt reports: doing much better. No significant back pain since last visit, has been able to proactively mitigate symptoms when he feels like he may be potentially heading that way.  He was compliant with home exercise program.  Response to previous treatment: no adverse effects  Functional change: improved lumbar tolerance w/ biking and ADLs    Pain: 1/10  Location: right hip      Objective     Lisa received therapeutic exercises to develop strength, endurance, ROM, flexibility, posture and core stabilization for 15 minutes including:  - Quadruped rock back w/ heavy band distraction 20x5"  - Squat walk down in rack 10x10"    Lisa received the following manual therapy techniques: Joint mobilizations were applied to the: hips for 12 minutes, including:  - Hip inferolateral glide mob gr IV both sides  - MWM hip flexion bilat    Lisa participated in neuromuscular re-education activities to improve: Balance, Coordination, Kinesthetic, Sense, Proprioception and Posture for 32 minutes. The following activities were included:  - FFE reverse lunge 2x8 ea 6" VC for lateral hip activation  - Pilates reformer hip " "abd 10x10" ea 2 straps  - Mod side plank clamshell 5x15" ea PTB    Home Exercises Provided and Patient Education Provided     Education provided:   - Role of PT, PT POC, hip anatomy, tendinopathy mechanism    Written Home Exercises Provided: Patient instructed to cont prior HEP.  Exercises were reviewed and Lisa was able to demonstrate them prior to the end of the session.  Lisa demonstrated good  understanding of the education provided.     See EMR under Patient Instructions for exercises provided prior visit.    Assessment     Progressing well. No LBP flares since last session in therapy, has been emphasizing core stability exercises w/ HEP. Biking limited by inclement weather, but adjustments to handle height seem to have helped positional tolerance. Good response to manual work today with improved hip joint symptoms and flexion/IR ROM afterwards. Training effect for lateral hip mm easily achieved w/ neuro re-ed work today.    Lisa is progressing well towards his goals.   Pt prognosis is Good.     Pt will continue to benefit from skilled outpatient physical therapy to address the deficits listed in the problem list box on initial evaluation, provide pt/family education and to maximize pt's level of independence in the home and community environment.     Pt's spiritual, cultural and educational needs considered and pt agreeable to plan of care and goals.    Anticipated barriers to physical therapy: Covid-19    Goals:   Short-Term Goals: 2-4 weeks (met)  - The patient will be independent with initial home exercise program.  - The patient will increase strength of hip muscles with MMT grossly to at least 4/5 to improve hip contribution and stability in weight bearing positions  - The patient will demonstrate ability to ambulate community distances with pain <3/10 at worst for improved functional mobility.     Long-Term Goals: 10-12 weeks (progressing, not met)  - Pt to achieve <30% limitation as measured by the " FOTO to demonstrate decreased disability.  - The patient will increase strength of hip muscles with MMT grossly to at least 4+/5 to improve hip contribution and stability in weight bearing positions  - The patient will demonstrate ability to ambulate community distances without pain for improved functional mobility.  - The patient will demonstrate ability to return to modified running and tennis to facilitate return to healthy lifestyle behaviors    Plan     Continue w/ current POC emphasizing hip and core strength, gait retraining, and gradual progression of functional activities as tolerated    Plan of care Certification: 5/28/2020 to 9/25/2020  Outpatient Physical Therapy 1 times weekly for 10 weeks to include the following interventions: Gait Training, Manual Therapy, Moist Heat/ Ice, Neuromuscular Re-ed, Patient Education, Self Care, Therapeutic Activites and Therapeutic Exercise.    Alanis Birmingham PT

## 2020-09-28 ENCOUNTER — PATIENT MESSAGE (OUTPATIENT)
Dept: ORTHOPEDICS | Facility: CLINIC | Age: 35
End: 2020-09-28

## 2020-09-28 RX ORDER — MELOXICAM 7.5 MG/1
7.5 TABLET ORAL DAILY
Qty: 14 TABLET | Refills: 0 | Status: SHIPPED | OUTPATIENT
Start: 2020-09-28 | End: 2020-10-14 | Stop reason: SDUPTHER

## 2020-10-05 ENCOUNTER — CLINICAL SUPPORT (OUTPATIENT)
Dept: REHABILITATION | Facility: HOSPITAL | Age: 35
End: 2020-10-05
Attending: FAMILY MEDICINE
Payer: COMMERCIAL

## 2020-10-05 DIAGNOSIS — R26.9 GAIT ABNORMALITY: ICD-10-CM

## 2020-10-05 DIAGNOSIS — M25.659 STIFFNESS OF HIP JOINT, UNSPECIFIED LATERALITY: ICD-10-CM

## 2020-10-05 DIAGNOSIS — M25.652 DECREASED RANGE OF MOTION OF BOTH HIPS: ICD-10-CM

## 2020-10-05 DIAGNOSIS — M25.651 DECREASED RANGE OF MOTION OF BOTH HIPS: ICD-10-CM

## 2020-10-05 DIAGNOSIS — R29.898 WEAKNESS OF BOTH HIPS: ICD-10-CM

## 2020-10-05 DIAGNOSIS — Z78.9 DIFFICULTY NAVIGATING STAIRS: ICD-10-CM

## 2020-10-05 PROCEDURE — 97140 MANUAL THERAPY 1/> REGIONS: CPT

## 2020-10-05 PROCEDURE — 97112 NEUROMUSCULAR REEDUCATION: CPT

## 2020-10-05 NOTE — PROGRESS NOTES
"  Physical Therapy Daily Treatment Note     Name: Lisa Garcia  Clinic Number: [id number]    Therapy Diagnosis:   Encounter Diagnoses   Name Primary?    Stiffness of hip joint, unspecified laterality     Difficulty navigating stairs     Gait abnormality     Weakness of both hips     Decreased range of motion of both hips      Physician: Marlo Galeana, *    Visit Date: 10/5/2020  Physician Orders: PT Eval and Treat  Medical Diagnosis from Referral: M25.551,M25.552 (ICD-10-CM) - Bilateral hip pain M47.816 (ICD-10-CM) - Lumbar spondylosis   Evaluation Date: 5/28/2020  Authorization Period Expiration: 12/31/2020  Plan of Care Expiration: 12/31/2020  Visit # / Visits authorized: 11/30    Time In: 1710 (pt late arrival)  Time Out: 1752  Total Billable Time: 41 minutes    Precautions: Standard    Subjective     Pt reports: hip is feeling great. No issues recently and felt good after last session. Increased LBP after moving houses over weekend, improved some w/ Meloxicam course.  He was compliant with home exercise program.  Response to previous treatment: no adverse effects  Functional change: improved lumbar tolerance w/ biking and ADLs    Pain: 0/10 hip, 4/10 lumbar  Location: right hip, low back    Objective     Lisa received therapeutic exercises to develop strength, endurance, ROM, flexibility, posture and core stabilization for 12 minutes including:  Quadruped rock back 20x5"  Squat walk down in rack 10x10"    Lisa received the following manual therapy techniques: Joint mobilizations were applied to the: hips for 10 minutes, including:  Hip inferolateral glide mob gr III-IV both sides    Lisa participated in neuromuscular re-education activities to improve: Balance, Coordination, Kinesthetic, Sense, Proprioception and Posture for 30 minutes. The following activities were included:  FFE reverse lunge 3x8 ea 6" VC for lateral hip activation  SL RDL 2x15 ea VC for level pelvis  DL stance anti-rotation " "press on CC 3x10 ea 13#  Pilates reformer hip abd 10x10" ea 2 straps -NT  Mod side plank clamshell 5x15" ea PTB -NT    Home Exercises Provided and Patient Education Provided     Education provided:   - Role of PT, PT POC, hip anatomy, tendinopathy mechanism    Written Home Exercises Provided: Patient instructed to cont prior HEP.  Exercises were reviewed and Lisa was able to demonstrate them prior to the end of the session.  Lisa demonstrated good  understanding of the education provided.     See EMR under Patient Instructions for exercises provided prior visit.    Assessment     Doing well today. Hip symptoms greatly improved. Some evidence of carryover from mobility work changes from previous session, continued w/ manual techniques and exercise progression here. No reproduction of tendinopathy symptoms w/ increased loading today. See re-assessment note for further detail.    Lisa is progressing well towards his goals.   Pt prognosis is Good.     Pt will continue to benefit from skilled outpatient physical therapy to address the deficits listed in the problem list box on initial evaluation, provide pt/family education and to maximize pt's level of independence in the home and community environment.     Pt's spiritual, cultural and educational needs considered and pt agreeable to plan of care and goals.    Anticipated barriers to physical therapy: Covid-19    Goals:   Short-Term Goals: 2-4 weeks (met)  - The patient will be independent with initial home exercise program.  - The patient will increase strength of hip muscles with MMT grossly to at least 4/5 to improve hip contribution and stability in weight bearing positions  - The patient will demonstrate ability to ambulate community distances with pain <3/10 at worst for improved functional mobility.     Long-Term Goals: 10-12 weeks (progressing, not met)  - Pt to achieve <30% limitation as measured by the FOTO to demonstrate decreased disability.  - The " patient will increase strength of hip muscles with MMT grossly to at least 4+/5 to improve hip contribution and stability in weight bearing positions  - The patient will demonstrate ability to ambulate community distances without pain for improved functional mobility.  - The patient will demonstrate ability to return to modified running and tennis to facilitate return to healthy lifestyle behaviors    Plan     Continue w/ current POC emphasizing hip and core strength, gait retraining, and gradual progression of functional activities as tolerated    Plan of care Certification: 10/5/2020 to 12/31/2020  Outpatient Physical Therapy 1 times weekly for 10 weeks to include the following interventions: Gait Training, Manual Therapy, Moist Heat/ Ice, Neuromuscular Re-ed, Patient Education, Self Care, Therapeutic Activites and Therapeutic Exercise.    Alanis Birmingham PT

## 2020-10-06 NOTE — PLAN OF CARE
[facility]  Physical Therapy Initial Evaluation    Name: iLsa Garcia  Clinic Number: [id number]    Therapy Diagnosis:   Encounter Diagnoses   Name Primary?    Bilateral hip pain     Lumbar spondylosis     Decreased range of motion of both hips Yes    Weakness of both hips     Gait abnormality     Difficulty navigating stairs     Stiffness of hip joint, unspecified laterality      Physician: Marlo Galeana, *    Physician Orders: PT Eval and Treat  Medical Diagnosis from Referral: M25.551,M25.552 (ICD-10-CM) - Bilateral hip pain M47.816 (ICD-10-CM) - Lumbar spondylosis   Evaluation Date: 5/28/2020  Authorization Period Expiration: 12/31/2020  Plan of Care Expiration: 12/31/2020  Visit # / Visits authorized: 11/30     Time In: 1710 (pt late arrival)  Time Out: 1752  Total Billable Time: 41 minutes    Precautions: Standard    Subjective   Date of onset: several months ago  History of current condition - Lisa reports: history of low back pain with degeneration at L4/L5. Has been doing healthy back program at [facility] but has noticed progressive onset of bilateral hip pain during this time. Started as R hip tightness and progressed to pain in both sides. Pain with sidelying. States father had hx of bilat COREY. Had XR done but states that imaging results were unremarkable. Pain is localized to lateral hips and limits functional mobility at longer distances and higher intensities. Additionally reports hx of ELAINA on R side.  Update (10/5/2020): Significant improvements since initiation of therapy, tendinopathy symptoms mostly resolved. LBP continues to ebb and flow, but feels like he has much better control and is back to doing more exercise and activity at previous level.     Medical History:   Past Medical History:   Diagnosis Date    Alopecia of scalp 2009    Medical history non-contributory        Surgical History:   Lisa Garcia  has a past surgical history that includes  Appendectomy.    Medications:   Lisa has a current medication list which includes the following prescription(s): clindamycin, ibuprofen, and meloxicam.    Allergies:   Review of patient's allergies indicates:  No Known Allergies     Imaging, MRI studies (hips): Two views bilateral. Right: There is mild DJD and impingement change. Left: There is mild DJD and impingement change.   XR (lumbar): There are 5 non-rib-bearing lumbar type vertebral bodies.  Minimal levoscoliosis centered at L3.  No evidence of an acute fracture.  AP alignment is relatively well maintained.  No instability.  There is intervertebral disc height loss at L4-5 and L5-S1.     Prior Therapy: Healthy Back with [facility] over last several months  [place]: [place] in [place]  Occupation:  with [employer]; primarily desk work  Prior Level of Function: indep w/ ADLs, work, and recreation  Current Level of Function: limited recreation, unable to exercise    Pain:  Current 0/10, worst 4/10, best 0/10   Location: lateral hip  Description: Sharp and stabbing  Aggravating Factors: internal rotation, running, lateral movement  Easing Factors: stretching    Pts goals: to improve tolerance to walking and running and return to recreational exercise and tennis    Objective     Observation: no acute distress    Posture: unremarkable    Gait: unremarkable    Hip Range of Motion:   Left active Left Passive Right active  Right Passive   Flexion 120 120 120 120   Abduction 45 45 45 45   Extension 10 10 10 10   Ext. Rotation 70 80 70 80   Int. Rotation 15 20 15 20     Lower Extremity Strength  Right LE  Left LE    Hip flexion: 5/5 Hip flexion: 5/5   Hip Internal Rotation:  5/5 Hip Internal Rotation: 5/5   Hip External Rotation: 4+/5 Hip External Rotation: 4+/5   Hip extension:  5/5 Hip extension: 5/5   Hip abduction: 4+/5 Hip abduction: 4+/5   Hip adduction: 5/5 Hip adduction 5/5     Special Tests:  Bridge Test: -  Step down Test: R: - ; L -  PREETI: -  FADIR:  "-  SCOUR: +    Plank: 30+ "    SL stance: 30" EC L, 10-15" EC R    Flexibility: SLR to 40 deg bilat; otherwise WNL    Joint Mobility: mild dec inferior/lateral glide bilat hips    Palpation: no TTP noted    Sensation: intact    Edema: none      CMS Impairment/Limitation/Restriction for FOTO Hip Survey    Therapist reviewed FOTO scores for Lisa Garcia on 5/28/2020.   FOTO documents entered into [employer] - see Media section.    Limitation Score: 53%  Category: Mobility         TREATMENT     See daily treatment note.    Assessment   Lisa is a 34 y.o. male referred to outpatient physical therapy with a medical diagnosis of bilateral hip pain with lumbar spondylosis. Pt presents with notable deficits in bilateral hip IR motion and reduced tolerance to combined flexion/IR consistent with change in bony morphology and potential ARIEL. Additionally, painful symptoms over the greater trochanter bilat coupled with deficits in hip abductor strength seem related to potential changes in mechanics with gait and functional activities that have created compressive effect over lateral hip and potentially bursa. Given this pt's hx of chronic LBP and flexion intolerance and reduced core stability, it is likely that some of the new hip symptoms were brought on by changes in mechanics of activities d/t LBP. This pt was counseled extensively on the normalcy of his bony anatomy and external hip rotation with normal activities that he has actively been working to correct ineffectively. Discussed importance of activity modification and hip/core retraining to improve mechanics and tolerance to loading moving forward with pt eager and agreeable.  Update (10/5/2020): Significant improvements noted since start of care. Tendinopathy symptoms mostly resolved, ready to gradually progress into some higher impact work now. Mostly limited by recurrent LBP at this point. Does seem to be much improved w/ better monitoring and prophylactic care when pt " recognizes potential incoming symptom onset. Better trunk/core control at this point, but still requires skilled therapy for progression into heavier functional loading for unrestricted return to previous activities.    Pt prognosis is Good.   Pt will benefit from skilled outpatient physical therapy to address the deficits listed above and in the chart below, provide pt/family education, and to maximize pt's level of independence.     Plan of care discussed with patient: Yes  Pt's spiritual, cultural and educational needs considered and patient is agreeable to the plan of care and goals as stated below:     Anticipated Barriers for therapy: Covid19    Medical Necessity is demonstrated by the following  History  Co-morbidities and personal factors that may impact the plan of care Co-morbidities:   none    Personal Factors:   no deficits     low   Examination  Body Structures and Functions, activity limitations and participation restrictions that may impact the plan of care Body Regions:   back  lower extremities    Body Systems:    gross symmetry  ROM  strength  balance  gait  motor control    Participation Restrictions:   Walking, running, tennis    Activity limitations:   Learning and applying knowledge  no deficits    General Tasks and Commands  no deficits    Communication  no deficits    Mobility  lifting and carrying objects  walking    Self care  no deficits    Domestic Life  doing house work (cleaning house, washing dishes, laundry)    Interactions/Relationships  no deficits    Life Areas  no deficits    Community and Social Life  no deficits         low   Clinical Presentation stable and uncomplicated low   Decision Making/ Complexity Score: low     Goals:  Short-Term Goals: 2-4 weeks (met)  - The patient will be independent with initial home exercise program.  - The patient will increase strength of hip muscles with MMT grossly to at least 4/5 to improve hip contribution and stability in weight bearing  positions  - The patient will demonstrate ability to ambulate community distances with pain <3/10 at worst for improved functional mobility.     Long-Term Goals: 10-12 weeks (progressing, not met)  - Pt to achieve <30% limitation as measured by the FOTO to demonstrate decreased disability.  - The patient will increase strength of hip muscles with MMT grossly to at least 4+/5 to improve hip contribution and stability in weight bearing positions  - The patient will demonstrate ability to ambulate community distances without pain for improved functional mobility.  - The patient will demonstrate ability to return to modified running and tennis to facilitate return to healthy lifestyle behaviors    Plan   Plan of care Certification: 10/5/2020 to 12/31/2020    Outpatient Physical Therapy 1 times weekly for 10 weeks to include the following interventions: Gait Training, Manual Therapy, Moist Heat/ Ice, Neuromuscular Re-ed, Patient Education, Self Care, Therapeutic Activites and Therapeutic Exercise.     Alanis Birmingham, PT

## 2020-10-14 ENCOUNTER — OFFICE VISIT (OUTPATIENT)
Dept: ORTHOPEDICS | Facility: CLINIC | Age: 35
End: 2020-10-14
Payer: COMMERCIAL

## 2020-10-14 DIAGNOSIS — M51.36 DDD (DEGENERATIVE DISC DISEASE), LUMBAR: Primary | ICD-10-CM

## 2020-10-14 PROCEDURE — 99999 PR PBB SHADOW E&M-EST. PATIENT-LVL III: ICD-10-PCS | Mod: PBBFAC,,, | Performed by: ORTHOPAEDIC SURGERY

## 2020-10-14 PROCEDURE — 99213 PR OFFICE/OUTPT VISIT, EST, LEVL III, 20-29 MIN: ICD-10-PCS | Mod: S$GLB,,, | Performed by: ORTHOPAEDIC SURGERY

## 2020-10-14 PROCEDURE — 99213 OFFICE O/P EST LOW 20 MIN: CPT | Mod: S$GLB,,, | Performed by: ORTHOPAEDIC SURGERY

## 2020-10-14 PROCEDURE — 99999 PR PBB SHADOW E&M-EST. PATIENT-LVL III: CPT | Mod: PBBFAC,,, | Performed by: ORTHOPAEDIC SURGERY

## 2020-10-14 RX ORDER — MELOXICAM 7.5 MG/1
15 TABLET ORAL DAILY
Qty: 30 TABLET | Refills: 1 | Status: SHIPPED | OUTPATIENT
Start: 2020-10-14 | End: 2021-02-08 | Stop reason: SDUPTHER

## 2020-10-14 NOTE — PROGRESS NOTES
DATE: 10/14/2020  PATIENT: Lisa Garcia    Attending Physician: Raymon Little M.D.    CHIEF COMPLAINT: LBP    HISTORY:  Lisa Garcia is a 35 y.o. male  here for initial evaluation of progressively worsening low back pain (Back - 2). The pain has been present for several years. The patient describes the pain as vague achy low back pain with intermittent episodes of severe pain that goes from his low back to his bilateral buttocks and up his back.  The pain is worse with standing, bending, lifting heavy objects and improved by standing. There is intermittent associated numbness and tingling down right leg. There is no subjective weakness. Prior treatments have included IM CSI, meloxicam, healthy back program, but no MITCH.    The Patient denies myelopathic symptoms such as handwriting changes or difficulty with buttons/coins/keys. Denies perineal paresthesias, bowel/bladder dysfunction.    Interval history 10/14/2020:  Patient returns today after a recent flare of his low back pain.  He states that he was moving houses and aggravated his back by lifting a box.  He denies any radiation of his pain.  He is taking NSAIDs and watching his activities and feels as though he is improving.    PAST MEDICAL/SURGICAL HISTORY:  Past Medical History:   Diagnosis Date    Alopecia of scalp 2009    Medical history non-contributory      Past Surgical History:   Procedure Laterality Date    APPENDECTOMY         Current Medications:   Current Outpatient Medications:     clindamycin (CLEOCIN T) 1 % external solution, Apply topically 2 (two) times daily., Disp: 1 Bottle, Rfl: 3    meloxicam (MOBIC) 7.5 MG tablet, Take 2 tablets (15 mg total) by mouth once daily., Disp: 30 tablet, Rfl: 1    ibuprofen (ADVIL,MOTRIN) 200 MG tablet, Take 200 mg by mouth every 6 (six) hours as needed for Pain., Disp: , Rfl:     Social History:   Social History     Socioeconomic History    Marital status: Significant Other     Spouse name: Not on  file    Number of children: 0    Years of education: Not on file    Highest education level: Not on file   Occupational History    Occupation:    Social Needs    Financial resource strain: Not on file    Food insecurity     Worry: Not on file     Inability: Not on file    Transportation needs     Medical: Not on file     Non-medical: Not on file   Tobacco Use    Smoking status: Current Some Day Smoker     Types: Cigars    Smokeless tobacco: Never Used   Substance and Sexual Activity    Alcohol use: Yes     Alcohol/week: 0.0 standard drinks     Comment: social     Drug use: No    Sexual activity: Yes     Partners: Female   Lifestyle    Physical activity     Days per week: Not on file     Minutes per session: Not on file    Stress: To some extent   Relationships    Social connections     Talks on phone: Not on file     Gets together: Not on file     Attends Worship service: Not on file     Active member of club or organization: Not on file     Attends meetings of clubs or organizations: Not on file     Relationship status: Not on file   Other Topics Concern    Not on file   Social History Narrative    Not on file       REVIEW OF SYSTEMS:  Constitution: Negative. Negative for chills, fever and night sweats.   Cardiovascular: Negative for chest pain and syncope.   Respiratory: Negative for cough and shortness of breath.   Gastrointestinal: See HPI. Negative for nausea/vomiting. Negative for abdominal pain.  Genitourinary: See HPI. Negative for discoloration or dysuria.  Hematologic/Lymphatic: negative for bleeding/clotting disorders.   Musculoskeletal: Negative for falls and muscle weakness.   Neurological: See HPI. no history of seizures. no history of cranial surgery or shunts.  Neurological: See HPI. No seizures.   Endocrine: Negative for polydipsia, polyphagia and polyuria.   Allergic/Immunologic: Negative for hives and persistent infections.     EXAM:  There were no vitals taken for this  visit.    PHYSICAL EXAMINATION:    General: The patient is a  35 y.o. male in no apparent distress, the patient is orientatied to person, place and time.  Psych: Normal mood and affect  HEENT: Vision grossly intact, hearing intact to the spoken word.  Lungs: Respirations unlabored.  Gait: Normal station and gait, no difficulty with toe or heel walk.   Skin: Dorsal lumbar skin negative for rashes, lesions, hairy patches and surgical scars. There is paraspinal lumbar tenderness to palpation.  Range of motion: Lumbar range of motion is acceptable.  Spinal Balance: Global saggital and coronal spinal balance acceptable, no significant for scoliosis and kyphosis.  Musculoskeletal: No pain with the range of motion of the bilateral hips. No trochanteric tenderness to palpation.  Vascular: Bilateral lower extremities warm and well perfused, Dorsalis pedis pulses 2+ bilaterally.  Neurological: Normal strength and tone in all major motor groups in the bilateral lower extremities. Normal sensation to light touch in the L2-S1 dermatomes bilaterally.  Deep tendon reflexes symmetric 2+ in the bilateral lower extremities.  Negative Babinski bilaterally. Straight leg raise negative bilaterally.    IMAGING:      Today I personally reviewed AP, Lat and Flex/Ex  upright L-spine that demonstrate lumbar spondylosis with disc space narrowing most severe L4/L5 and L5/S1      MRI with endplate changes L4/5    There is no height or weight on file to calculate BMI.  No results found for: HGBA1C    ASSESSMENT/PLAN:    Will continue to treat conservatively at this time.  If pain does not improve, we will consider p.o. steroids or MITCH.

## 2020-10-16 ENCOUNTER — CLINICAL SUPPORT (OUTPATIENT)
Dept: REHABILITATION | Facility: HOSPITAL | Age: 35
End: 2020-10-16
Attending: FAMILY MEDICINE
Payer: COMMERCIAL

## 2020-10-16 DIAGNOSIS — M25.651 DECREASED RANGE OF MOTION OF BOTH HIPS: ICD-10-CM

## 2020-10-16 DIAGNOSIS — M25.652 DECREASED RANGE OF MOTION OF BOTH HIPS: ICD-10-CM

## 2020-10-16 DIAGNOSIS — M25.659 STIFFNESS OF HIP JOINT, UNSPECIFIED LATERALITY: ICD-10-CM

## 2020-10-16 DIAGNOSIS — Z78.9 DIFFICULTY NAVIGATING STAIRS: ICD-10-CM

## 2020-10-16 DIAGNOSIS — R29.898 WEAKNESS OF BOTH HIPS: ICD-10-CM

## 2020-10-16 DIAGNOSIS — R26.9 GAIT ABNORMALITY: ICD-10-CM

## 2020-10-16 PROCEDURE — 97530 THERAPEUTIC ACTIVITIES: CPT | Mod: 59

## 2020-10-16 PROCEDURE — 97140 MANUAL THERAPY 1/> REGIONS: CPT

## 2020-10-16 PROCEDURE — 97110 THERAPEUTIC EXERCISES: CPT

## 2020-10-16 PROCEDURE — 97112 NEUROMUSCULAR REEDUCATION: CPT

## 2020-10-16 NOTE — PROGRESS NOTES
Physical Therapy Daily Treatment Note     Name: Lisa Garcia  Clinic Number: [id number]    Therapy Diagnosis:   Encounter Diagnoses   Name Primary?    Stiffness of hip joint, unspecified laterality     Difficulty navigating stairs     Gait abnormality     Weakness of both hips     Decreased range of motion of both hips      Physician: Phyllis Montana PA-C    Visit Date: 10/16/2020  Physician Orders: PT Eval and Treat  Medical Diagnosis from Referral: M25.551,M25.552 (ICD-10-CM) - Bilateral hip pain M47.816 (ICD-10-CM) - Lumbar spondylosis   Evaluation Date: 5/28/2020  Authorization Period Expiration: 12/31/2020  Plan of Care Expiration: 12/31/2020  Visit # / Visits authorized: 12/30    Time In: 1430  Time Out: 1525  Total Billable Time: 55 minutes    Precautions: Standard    Subjective     Pt reports: doing much better. Has been working out consistently in new gym over the past week.  He was compliant with home exercise program.  Response to previous treatment: no adverse effects  Functional change: improved lumbar tolerance w/ biking and ADLs    Pain: 0/10 hip, 0/10 lumbar  Location: right hip, low back    Objective     Lisa received therapeutic exercises to develop strength, endurance, ROM, flexibility, posture and core stabilization for 13 minutes including:  Lateral band walk 3 trips ea PTB  HK anti-rotation press 3x10 ea 17#    Lisa received the following manual therapy techniques: Joint mobilizations were applied to the: hips for 8 minutes, including:  Hip inferolateral glide mob gr III-IV L only    Lisa participated in neuromuscular re-education activities to improve: Balance, Coordination, Kinesthetic, Sense, Proprioception and Posture for 10 minutes. The following activities were included:  Bridge march x10 ea  Suitcase march 2 trips 35#    Lisa participated in dynamic functional therapeutic activities to improve functional performance for 24  minutes, including:  Shuttle DL jumps x20 2  straps  Shuttle alt SL hops 2x15 ea 1.5 straps  Shuttle sidelying SL hops 2x20 1.5 straps R only    Home Exercises Provided and Patient Education Provided     Education provided:   - Role of PT, PT POC, hip anatomy, tendinopathy mechanism    Written Home Exercises Provided: Patient instructed to cont prior HEP.  Exercises were reviewed and Lisa was able to demonstrate them prior to the end of the session.  Lisa demonstrated good  understanding of the education provided.     See EMR under Patient Instructions for exercises provided prior visit.    Assessment     Progressing nicely. Able to introduce some jumping and plyometric loading on this date w/ very good tolerance. Plan to gradually progress intensity/volume towards goal of resuming running with jog/walk program.    Lisa is progressing well towards his goals.   Pt prognosis is Good.     Pt will continue to benefit from skilled outpatient physical therapy to address the deficits listed in the problem list box on initial evaluation, provide pt/family education and to maximize pt's level of independence in the home and community environment.     Pt's spiritual, cultural and educational needs considered and pt agreeable to plan of care and goals.    Anticipated barriers to physical therapy: Covid-19    Goals:   Short-Term Goals: 2-4 weeks (met)  - The patient will be independent with initial home exercise program.  - The patient will increase strength of hip muscles with MMT grossly to at least 4/5 to improve hip contribution and stability in weight bearing positions  - The patient will demonstrate ability to ambulate community distances with pain <3/10 at worst for improved functional mobility.     Long-Term Goals: 10-12 weeks (progressing, not met)  - Pt to achieve <30% limitation as measured by the FOTO to demonstrate decreased disability.  - The patient will increase strength of hip muscles with MMT grossly to at least 4+/5 to improve hip contribution and  stability in weight bearing positions  - The patient will demonstrate ability to ambulate community distances without pain for improved functional mobility.  - The patient will demonstrate ability to return to modified running and tennis to facilitate return to healthy lifestyle behaviors    Plan     Continue w/ current POC emphasizing hip and core strength, gait retraining, and gradual progression of functional activities as tolerated    Plan of care Certification: 10/5/2020 to 12/31/2020  Outpatient Physical Therapy 1 times weekly for 10 weeks to include the following interventions: Gait Training, Manual Therapy, Moist Heat/ Ice, Neuromuscular Re-ed, Patient Education, Self Care, Therapeutic Activites and Therapeutic Exercise.    Alanis Birmingham PT

## 2020-11-02 ENCOUNTER — CLINICAL SUPPORT (OUTPATIENT)
Dept: REHABILITATION | Facility: HOSPITAL | Age: 35
End: 2020-11-02
Attending: FAMILY MEDICINE
Payer: COMMERCIAL

## 2020-11-02 DIAGNOSIS — R29.898 WEAKNESS OF BOTH HIPS: ICD-10-CM

## 2020-11-02 DIAGNOSIS — R26.9 GAIT ABNORMALITY: ICD-10-CM

## 2020-11-02 DIAGNOSIS — Z78.9 DIFFICULTY NAVIGATING STAIRS: ICD-10-CM

## 2020-11-02 DIAGNOSIS — M25.659 STIFFNESS OF HIP JOINT, UNSPECIFIED LATERALITY: ICD-10-CM

## 2020-11-02 DIAGNOSIS — M25.651 DECREASED RANGE OF MOTION OF BOTH HIPS: ICD-10-CM

## 2020-11-02 DIAGNOSIS — M25.652 DECREASED RANGE OF MOTION OF BOTH HIPS: ICD-10-CM

## 2020-11-02 PROCEDURE — 97112 NEUROMUSCULAR REEDUCATION: CPT

## 2020-11-02 PROCEDURE — 97110 THERAPEUTIC EXERCISES: CPT

## 2020-11-02 PROCEDURE — 97530 THERAPEUTIC ACTIVITIES: CPT

## 2020-11-02 NOTE — PROGRESS NOTES
Physical Therapy Daily Treatment Note     Name: Lisa Garcia  Clinic Number: [id number]    Therapy Diagnosis:   Encounter Diagnoses   Name Primary?    Stiffness of hip joint, unspecified laterality     Difficulty navigating stairs     Gait abnormality     Weakness of both hips     Decreased range of motion of both hips      Physician: Phyllis Montana PA-C    Visit Date: 11/2/2020  Physician Orders: PT Eval and Treat  Medical Diagnosis from Referral: M25.551,M25.552 (ICD-10-CM) - Bilateral hip pain M47.816 (ICD-10-CM) - Lumbar spondylosis   Evaluation Date: 5/28/2020  Authorization Period Expiration: 12/31/2020  Plan of Care Expiration: 12/31/2020  Visit # / Visits authorized: 13/30    Time In: 1746  Time Out: 1834  Total Billable Time: 48 minutes    Precautions: Standard    Subjective     Pt reports: doing great. Did a bunch of walking and hiking on vacation without being limited by hip or low back.  He was compliant with home exercise program.  Response to previous treatment: no adverse effects  Functional change: improved lumbar tolerance w/ biking and ADLs    Pain: 0/10 hip, 0/10 lumbar  Location: right hip, low back    Objective     Lisa received therapeutic exercises to develop strength, endurance, ROM, flexibility, posture and core stabilization for 11 minutes including:  HK rock back x20  Squat walk down in rack x20  Lateral band walk 3 trips ea PTB    Lisa received the following manual therapy techniques: Joint mobilizations were applied to the: hips for 0 minutes, including:    Lisa participated in neuromuscular re-education activities to improve: Balance, Coordination, Kinesthetic, Sense, Proprioception and Posture for 25 minutes. The following activities were included:  DB deadlift 3x10 25# VC for hip hinge and ROM  Isometric hip hinge w/ shoulder ext on CC 2x10 17# ea  TK shoulder ext on CC 2x10 17# ea  HK anti-rotation press 3x10 ea 17#    Lisa participated in dynamic functional therapeutic  activities to improve functional performance for 12 minutes, including:  Dynamic lateral shuffle 3 laps ea  DL jump + stick 3x10    Home Exercises Provided and Patient Education Provided     Education provided:   - Role of PT, PT POC, hip anatomy, tendinopathy mechanism    Written Home Exercises Provided: Patient instructed to cont prior HEP.  Exercises were reviewed and Lisa was able to demonstrate them prior to the end of the session.  Lisa demonstrated good  understanding of the education provided.     See EMR under Patient Instructions for exercises provided prior visit.    Assessment     Doing great. No exacerbation of symptoms during trip last week involving lots of activity. Doing well clinically, able to progress jumping/landing, dynamic mobility, and lifting work.    Lisa is progressing well towards his goals.   Pt prognosis is Good.     Pt will continue to benefit from skilled outpatient physical therapy to address the deficits listed in the problem list box on initial evaluation, provide pt/family education and to maximize pt's level of independence in the home and community environment.     Pt's spiritual, cultural and educational needs considered and pt agreeable to plan of care and goals.    Anticipated barriers to physical therapy: Covid-19    Goals:   Short-Term Goals: 2-4 weeks (met)  - The patient will be independent with initial home exercise program.  - The patient will increase strength of hip muscles with MMT grossly to at least 4/5 to improve hip contribution and stability in weight bearing positions  - The patient will demonstrate ability to ambulate community distances with pain <3/10 at worst for improved functional mobility.     Long-Term Goals: 10-12 weeks (progressing, not met)  - Pt to achieve <30% limitation as measured by the FOTO to demonstrate decreased disability.  - The patient will increase strength of hip muscles with MMT grossly to at least 4+/5 to improve hip contribution  and stability in weight bearing positions  - The patient will demonstrate ability to ambulate community distances without pain for improved functional mobility.  - The patient will demonstrate ability to return to modified running and tennis to facilitate return to healthy lifestyle behaviors    Plan     Continue w/ current POC emphasizing hip and core strength, gait retraining, and gradual progression of functional activities as tolerated    Plan of care Certification: 10/5/2020 to 12/31/2020  Outpatient Physical Therapy 1 times weekly for 10 weeks to include the following interventions: Gait Training, Manual Therapy, Moist Heat/ Ice, Neuromuscular Re-ed, Patient Education, Self Care, Therapeutic Activites and Therapeutic Exercise.    Alanis Birmingham PT

## 2020-11-12 ENCOUNTER — PATIENT MESSAGE (OUTPATIENT)
Dept: REHABILITATION | Facility: HOSPITAL | Age: 35
End: 2020-11-12

## 2020-11-23 ENCOUNTER — DOCUMENTATION ONLY (OUTPATIENT)
Dept: REHABILITATION | Facility: HOSPITAL | Age: 35
End: 2020-11-23

## 2020-11-23 ENCOUNTER — CLINICAL SUPPORT (OUTPATIENT)
Dept: REHABILITATION | Facility: HOSPITAL | Age: 35
End: 2020-11-23
Attending: FAMILY MEDICINE
Payer: COMMERCIAL

## 2020-11-23 DIAGNOSIS — R29.898 WEAKNESS OF BOTH HIPS: ICD-10-CM

## 2020-11-23 DIAGNOSIS — R26.9 GAIT ABNORMALITY: ICD-10-CM

## 2020-11-23 DIAGNOSIS — M25.652 DECREASED RANGE OF MOTION OF BOTH HIPS: ICD-10-CM

## 2020-11-23 DIAGNOSIS — M25.651 DECREASED RANGE OF MOTION OF BOTH HIPS: ICD-10-CM

## 2020-11-23 DIAGNOSIS — Z78.9 DIFFICULTY NAVIGATING STAIRS: ICD-10-CM

## 2020-11-23 DIAGNOSIS — M25.659 STIFFNESS OF HIP JOINT, UNSPECIFIED LATERALITY: ICD-10-CM

## 2020-11-23 PROCEDURE — 97110 THERAPEUTIC EXERCISES: CPT

## 2020-11-23 PROCEDURE — 97112 NEUROMUSCULAR REEDUCATION: CPT

## 2020-11-23 NOTE — PROGRESS NOTES
Physical Therapy Daily Treatment Note     Name: Lisa Garcia  Clinic Number: [id number]    Therapy Diagnosis:   Encounter Diagnoses   Name Primary?    Stiffness of hip joint, unspecified laterality     Difficulty navigating stairs     Gait abnormality     Weakness of both hips     Decreased range of motion of both hips      Physician: Marlo Galeana, *    Visit Date: 11/23/2020  Physician Orders: PT Eval and Treat  Medical Diagnosis from Referral: M25.551,M25.552 (ICD-10-CM) - Bilateral hip pain M47.816 (ICD-10-CM) - Lumbar spondylosis   Evaluation Date: 5/28/2020  Authorization Period Expiration: 12/31/2020  Plan of Care Expiration: 12/31/2020  Visit # / Visits authorized: 12/30    Time In: 1745  Time Out: 1830  Total Billable Time: 45 minutes    Precautions: Standard    Subjective     Pt reports: Feeling pretty good been going to the gym and started walk/jog intervals on the treadmill and been doing pretty good with it just a little hip soreness after.   He was compliant with home exercise program.  Response to previous treatment: no adverse effects  Functional change: improved lumbar tolerance w/ biking and ADLs    Pain: 0/10 hip, 0/10 lumbar  Location: right hip, low back    Objective     Lisa received therapeutic exercises to develop strength, endurance, ROM, flexibility, posture and core stabilization for 25 minutes including:  HK rock back x20  Lateral band walk 3 trips ea PTB  Single Leg TRX squat 2x10 ea VC for foot position   Suitcase carry 35# KB 2 laps ea     Lisa received the following manual therapy techniques: Joint mobilizations were applied to the: hips for 0 minutes, including:    Lisa participated in neuromuscular re-education activities to improve: Balance, Coordination, Kinesthetic, Sense, Proprioception and Posture for 20 minutes. The following activities were included:  DB deadlift 3x10 26# KB VC for hip hinge and ROM  Step up with opposite hand pull 20# 2x10 ea     Lisa  participated in dynamic functional therapeutic activities to improve functional performance for 0 minutes, including:    Home Exercises Provided and Patient Education Provided     Education provided:   - Role of PT, PT POC, hip anatomy, tendinopathy mechanism    Written Home Exercises Provided: Patient instructed to cont prior HEP.  Exercises were reviewed and Lisa was able to demonstrate them prior to the end of the session.  Lisa demonstrated good  understanding of the education provided.     See EMR under Patient Instructions for exercises provided prior visit.    Assessment     Pt doing well with therapy. Able to return to the gym more and started walk/jog on treadmill with no real issues. Pt showed notable improvement with deadlift pattern with kettlebell today. Able to complete more loading exercises today and able to work in increased hip flexion positions today with no problems. Pt educated to continue to work on HEP and add in exercises from today. Continue to progress with walk/jog progression on treadmill and gradually start building up tolerance. Pt feeling great ready to trial on his own for a couple of weeks working on exercises at the gym and provided updated HEP for exercises.     Lisa is progressing well towards his goals.   Pt prognosis is Good.     Pt will continue to benefit from skilled outpatient physical therapy to address the deficits listed in the problem list box on initial evaluation, provide pt/family education and to maximize pt's level of independence in the home and community environment.     Pt's spiritual, cultural and educational needs considered and pt agreeable to plan of care and goals.    Anticipated barriers to physical therapy: Covid-19    Goals:   Short-Term Goals: 2-4 weeks (met)  - The patient will be independent with initial home exercise program.  - The patient will increase strength of hip muscles with MMT grossly to at least 4/5 to improve hip contribution and  stability in weight bearing positions  - The patient will demonstrate ability to ambulate community distances with pain <3/10 at worst for improved functional mobility.     Long-Term Goals: 10-12 weeks (progressing, not met)  - Pt to achieve <30% limitation as measured by the FOTO to demonstrate decreased disability.  - The patient will increase strength of hip muscles with MMT grossly to at least 4+/5 to improve hip contribution and stability in weight bearing positions  - The patient will demonstrate ability to ambulate community distances without pain for improved functional mobility.  - The patient will demonstrate ability to return to modified running and tennis to facilitate return to healthy lifestyle behaviors    Plan     Continue w/ current POC emphasizing hip and core strength, gait retraining, and gradual progression of functional activities as tolerated    Plan of care Certification: 10/5/2020 to 12/31/2020  Outpatient Physical Therapy 1 times weekly for 10 weeks to include the following interventions: Gait Training, Manual Therapy, Moist Heat/ Ice, Neuromuscular Re-ed, Patient Education, Self Care, Therapeutic Activites and Therapeutic Exercise.    Alanis Birmingham PT     Co-treated and documented by Birdie Sears, SPT

## 2020-11-24 NOTE — PROGRESS NOTES
Outpatient Therapy Discharge Summary     Name: Lisa Garcia  [facility] Number: [id number]    Therapy Diagnosis:        Encounter Diagnoses   Name Primary?    Stiffness of hip joint, unspecified laterality      Difficulty navigating stairs      Gait abnormality      Weakness of both hips      Decreased range of motion of both hips        Physician: Marlo Galeana, *     Visit Date: 11/23/2020  Physician Orders: PT Eval and Treat  Medical Diagnosis from Referral: M25.551,M25.552 (ICD-10-CM) - Bilateral hip pain M47.816 (ICD-10-CM) - Lumbar spondylosis   Evaluation Date: 5/28/2020    Date of Last visit: 11/23/2020  Total Visits Received: 12  Cancelled Visits: -  No Show Visits: -    Assessment    Goals: met    Discharge reason: Patient has met all of his/her goals    Plan   This patient is discharged from Physical Therapy    Alanis Birmingham PT, DPT  11/23/2020

## 2021-01-22 DIAGNOSIS — Z00.00 ANNUAL PHYSICAL EXAM: Primary | ICD-10-CM

## 2021-02-05 ENCOUNTER — CLINICAL SUPPORT (OUTPATIENT)
Dept: INTERNAL MEDICINE | Facility: CLINIC | Age: 36
End: 2021-02-05
Payer: COMMERCIAL

## 2021-02-05 ENCOUNTER — HOSPITAL ENCOUNTER (OUTPATIENT)
Dept: RADIOLOGY | Facility: HOSPITAL | Age: 36
Discharge: HOME | End: 2021-02-05
Attending: INTERNAL MEDICINE
Payer: COMMERCIAL

## 2021-02-05 ENCOUNTER — HOSPITAL ENCOUNTER (OUTPATIENT)
Dept: CARDIOLOGY | Facility: CLINIC | Age: 36
Discharge: HOME | End: 2021-02-05
Attending: INTERNAL MEDICINE
Payer: COMMERCIAL

## 2021-02-05 ENCOUNTER — OFFICE VISIT (OUTPATIENT)
Dept: INTERNAL MEDICINE | Facility: CLINIC | Age: 36
End: 2021-02-05
Payer: COMMERCIAL

## 2021-02-05 VITALS
HEIGHT: 74 IN | HEART RATE: 68 BPM | SYSTOLIC BLOOD PRESSURE: 120 MMHG | TEMPERATURE: 99 F | WEIGHT: 225.63 LBS | BODY MASS INDEX: 28.96 KG/M2 | DIASTOLIC BLOOD PRESSURE: 77 MMHG

## 2021-02-05 DIAGNOSIS — Z00.00 ENCOUNTER FOR ANNUAL HEALTH EXAMINATION: Primary | ICD-10-CM

## 2021-02-05 DIAGNOSIS — Z00.00 ANNUAL PHYSICAL EXAM: ICD-10-CM

## 2021-02-05 DIAGNOSIS — Z00.00 ROUTINE GENERAL MEDICAL EXAMINATION AT A HEALTH CARE FACILITY: Primary | ICD-10-CM

## 2021-02-05 LAB
ALBUMIN SERPL BCP-MCNC: 4.1 G/DL (ref 3.5–5.2)
ALP SERPL-CCNC: 48 U/L (ref 55–135)
ALT SERPL W/O P-5'-P-CCNC: 22 U/L (ref 10–44)
ANION GAP SERPL CALC-SCNC: 7 MMOL/L (ref 8–16)
AST SERPL-CCNC: 22 U/L (ref 10–40)
BILIRUB SERPL-MCNC: 0.7 MG/DL (ref 0.1–1)
BUN SERPL-MCNC: 15 MG/DL (ref 6–20)
CALCIUM SERPL-MCNC: 9.1 MG/DL (ref 8.7–10.5)
CHLORIDE SERPL-SCNC: 106 MMOL/L (ref 95–110)
CHOLEST SERPL-MCNC: 176 MG/DL (ref 120–199)
CHOLEST/HDLC SERPL: 3.4 {RATIO} (ref 2–5)
CO2 SERPL-SCNC: 27 MMOL/L (ref 23–29)
CREAT SERPL-MCNC: 1.1 MG/DL (ref 0.5–1.4)
ERYTHROCYTE [DISTWIDTH] IN BLOOD BY AUTOMATED COUNT: 12.3 % (ref 11.5–14.5)
EST. GFR  (AFRICAN AMERICAN): >60 ML/MIN/1.73 M^2
EST. GFR  (NON AFRICAN AMERICAN): >60 ML/MIN/1.73 M^2
ESTIMATED AVG GLUCOSE: 103 MG/DL (ref 68–131)
GLUCOSE SERPL-MCNC: 97 MG/DL (ref 70–110)
HBA1C MFR BLD: 5.2 % (ref 4–5.6)
HCT VFR BLD AUTO: 46 % (ref 40–54)
HCV AB SERPL QL IA: NEGATIVE
HDLC SERPL-MCNC: 52 MG/DL (ref 40–75)
HDLC SERPL: 29.5 % (ref 20–50)
HGB BLD-MCNC: 15.4 G/DL (ref 14–18)
LDLC SERPL CALC-MCNC: 108.8 MG/DL (ref 63–159)
MCH RBC QN AUTO: 27.6 PG (ref 27–31)
MCHC RBC AUTO-ENTMCNC: 33.5 G/DL (ref 32–36)
MCV RBC AUTO: 83 FL (ref 82–98)
NONHDLC SERPL-MCNC: 124 MG/DL
PLATELET # BLD AUTO: 191 K/UL (ref 150–350)
PMV BLD AUTO: 10.9 FL (ref 9.2–12.9)
POTASSIUM SERPL-SCNC: 4.2 MMOL/L (ref 3.5–5.1)
PROT SERPL-MCNC: 7.4 G/DL (ref 6–8.4)
RBC # BLD AUTO: 5.57 M/UL (ref 4.6–6.2)
SODIUM SERPL-SCNC: 140 MMOL/L (ref 136–145)
TRIGL SERPL-MCNC: 76 MG/DL (ref 30–150)
TSH SERPL DL<=0.005 MIU/L-ACNC: 1.3 UIU/ML (ref 0.4–4)
WBC # BLD AUTO: 4.82 K/UL (ref 3.9–12.7)

## 2021-02-05 PROCEDURE — 36415 COLL VENOUS BLD VENIPUNCTURE: CPT

## 2021-02-05 PROCEDURE — 93010 ELECTROCARDIOGRAM REPORT: CPT | Mod: S$GLB,,, | Performed by: INTERNAL MEDICINE

## 2021-02-05 PROCEDURE — 3008F BODY MASS INDEX DOCD: CPT | Mod: CPTII,S$GLB,, | Performed by: INTERNAL MEDICINE

## 2021-02-05 PROCEDURE — 3008F PR BODY MASS INDEX (BMI) DOCUMENTED: ICD-10-PCS | Mod: CPTII,S$GLB,, | Performed by: INTERNAL MEDICINE

## 2021-02-05 PROCEDURE — 93010 EKG 12-LEAD: ICD-10-PCS | Mod: S$GLB,,, | Performed by: INTERNAL MEDICINE

## 2021-02-05 PROCEDURE — 80061 LIPID PANEL: CPT

## 2021-02-05 PROCEDURE — 1126F AMNT PAIN NOTED NONE PRSNT: CPT | Mod: S$GLB,,, | Performed by: INTERNAL MEDICINE

## 2021-02-05 PROCEDURE — 93005 ELECTROCARDIOGRAM TRACING: CPT | Mod: S$GLB,,, | Performed by: INTERNAL MEDICINE

## 2021-02-05 PROCEDURE — 93005 EKG 12-LEAD: ICD-10-PCS | Mod: S$GLB,,, | Performed by: INTERNAL MEDICINE

## 2021-02-05 PROCEDURE — 71046 X-RAY EXAM CHEST 2 VIEWS: CPT | Mod: TC,FY

## 2021-02-05 PROCEDURE — 97802 MEDICAL NUTRITION INDIV IN: CPT | Mod: S$GLB,,, | Performed by: INTERNAL MEDICINE

## 2021-02-05 PROCEDURE — 71046 X-RAY EXAM CHEST 2 VIEWS: CPT | Mod: 26,,, | Performed by: RADIOLOGY

## 2021-02-05 PROCEDURE — 97750 PR PHYSICAL PERFORMANCE TEST: ICD-10-PCS | Mod: S$GLB,,, | Performed by: INTERNAL MEDICINE

## 2021-02-05 PROCEDURE — 99999 PR PBB SHADOW E&M-EST. PATIENT-LVL III: CPT | Mod: PBBFAC,,, | Performed by: INTERNAL MEDICINE

## 2021-02-05 PROCEDURE — 99999 PR PBB SHADOW E&M-EST. PATIENT-LVL III: ICD-10-PCS | Mod: PBBFAC,,, | Performed by: INTERNAL MEDICINE

## 2021-02-05 PROCEDURE — 1126F PR PAIN SEVERITY QUANTIFIED, NO PAIN PRESENT: ICD-10-PCS | Mod: S$GLB,,, | Performed by: INTERNAL MEDICINE

## 2021-02-05 PROCEDURE — 97802 PR MED NUTR THER, 1ST, INDIV, EA 15 MIN: ICD-10-PCS | Mod: S$GLB,,, | Performed by: INTERNAL MEDICINE

## 2021-02-05 PROCEDURE — 99395 PR PREVENTIVE VISIT,EST,18-39: ICD-10-PCS | Mod: S$GLB,,, | Performed by: INTERNAL MEDICINE

## 2021-02-05 PROCEDURE — 84443 ASSAY THYROID STIM HORMONE: CPT

## 2021-02-05 PROCEDURE — 99395 PREV VISIT EST AGE 18-39: CPT | Mod: S$GLB,,, | Performed by: INTERNAL MEDICINE

## 2021-02-05 PROCEDURE — 83036 HEMOGLOBIN GLYCOSYLATED A1C: CPT

## 2021-02-05 PROCEDURE — 85027 COMPLETE CBC AUTOMATED: CPT

## 2021-02-05 PROCEDURE — 99999 PR PBB SHADOW E&M-EST. PATIENT-LVL I: ICD-10-PCS | Mod: PBBFAC,,,

## 2021-02-05 PROCEDURE — 71046 XR CHEST PA AND LATERAL: ICD-10-PCS | Mod: 26,,, | Performed by: RADIOLOGY

## 2021-02-05 PROCEDURE — 80053 COMPREHEN METABOLIC PANEL: CPT

## 2021-02-05 PROCEDURE — 86803 HEPATITIS C AB TEST: CPT

## 2021-02-05 PROCEDURE — 99999 PR PBB SHADOW E&M-EST. PATIENT-LVL I: CPT | Mod: PBBFAC,,,

## 2021-02-05 PROCEDURE — 97750 PHYSICAL PERFORMANCE TEST: CPT | Mod: S$GLB,,, | Performed by: INTERNAL MEDICINE

## 2021-02-08 ENCOUNTER — PATIENT MESSAGE (OUTPATIENT)
Dept: ORTHOPEDICS | Facility: CLINIC | Age: 36
End: 2021-02-08

## 2021-02-08 RX ORDER — MELOXICAM 7.5 MG/1
15 TABLET ORAL DAILY
Qty: 30 TABLET | Refills: 1 | Status: SHIPPED | OUTPATIENT
Start: 2021-02-08 | End: 2021-06-25

## 2021-04-28 ENCOUNTER — PATIENT MESSAGE (OUTPATIENT)
Dept: RESEARCH | Facility: HOSPITAL | Age: 36
End: 2021-04-28

## 2021-05-11 ENCOUNTER — PATIENT MESSAGE (OUTPATIENT)
Dept: DERMATOLOGY | Facility: CLINIC | Age: 36
End: 2021-05-11

## 2021-05-12 ENCOUNTER — PATIENT OUTREACH (OUTPATIENT)
Dept: ADMINISTRATIVE | Facility: OTHER | Age: 36
End: 2021-05-12

## 2021-05-13 ENCOUNTER — OFFICE VISIT (OUTPATIENT)
Dept: DERMATOLOGY | Facility: CLINIC | Age: 36
End: 2021-05-13
Payer: COMMERCIAL

## 2021-05-13 DIAGNOSIS — L08.9 PUSTULE: Primary | ICD-10-CM

## 2021-05-13 DIAGNOSIS — L66.9 CICATRICIAL ALOPECIA: ICD-10-CM

## 2021-05-13 DIAGNOSIS — L64.9 ANDROGENIC ALOPECIA: ICD-10-CM

## 2021-05-13 PROCEDURE — 87076 CULTURE ANAEROBE IDENT EACH: CPT | Performed by: DERMATOLOGY

## 2021-05-13 PROCEDURE — 87070 CULTURE OTHR SPECIMN AEROBIC: CPT | Performed by: DERMATOLOGY

## 2021-05-13 PROCEDURE — 99204 OFFICE O/P NEW MOD 45 MIN: CPT | Mod: S$GLB,,, | Performed by: DERMATOLOGY

## 2021-05-13 PROCEDURE — 99204 PR OFFICE/OUTPT VISIT, NEW, LEVL IV, 45-59 MIN: ICD-10-PCS | Mod: S$GLB,,, | Performed by: DERMATOLOGY

## 2021-05-13 PROCEDURE — 87075 CULTR BACTERIA EXCEPT BLOOD: CPT | Performed by: DERMATOLOGY

## 2021-05-13 PROCEDURE — 1126F AMNT PAIN NOTED NONE PRSNT: CPT | Mod: S$GLB,,, | Performed by: DERMATOLOGY

## 2021-05-13 PROCEDURE — 1126F PR PAIN SEVERITY QUANTIFIED, NO PAIN PRESENT: ICD-10-PCS | Mod: S$GLB,,, | Performed by: DERMATOLOGY

## 2021-05-17 LAB
BACTERIA SPEC AEROBE CULT: NO GROWTH
BACTERIA SPEC ANAEROBE CULT: ABNORMAL

## 2021-06-08 ENCOUNTER — HOSPITAL ENCOUNTER (OUTPATIENT)
Dept: RADIOLOGY | Facility: HOSPITAL | Age: 36
Discharge: HOME | End: 2021-06-08
Attending: FAMILY MEDICINE
Payer: COMMERCIAL

## 2021-06-08 ENCOUNTER — OFFICE VISIT (OUTPATIENT)
Dept: SPORTS MEDICINE | Facility: CLINIC | Age: 36
End: 2021-06-08
Payer: COMMERCIAL

## 2021-06-08 VITALS — WEIGHT: 226 LBS | BODY MASS INDEX: 29 KG/M2 | TEMPERATURE: 98 F | HEIGHT: 74 IN

## 2021-06-08 DIAGNOSIS — M67.912 DYSFUNCTION OF ROTATOR CUFF OF BOTH SHOULDERS: ICD-10-CM

## 2021-06-08 DIAGNOSIS — M25.511 BILATERAL SHOULDER PAIN, UNSPECIFIED CHRONICITY: Primary | ICD-10-CM
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
## 2021-06-08 DIAGNOSIS — M67.911 DYSFUNCTION OF ROTATOR CUFF OF BOTH SHOULDERS: ICD-10-CM

## 2021-06-08 DIAGNOSIS — M67.80 TENDINOSIS: ICD-10-CM

## 2021-06-08 DIAGNOSIS — M25.511 RIGHT SHOULDER PAIN, UNSPECIFIED CHRONICITY: ICD-10-CM

## 2021-06-08 DIAGNOSIS — M25.512 BILATERAL SHOULDER PAIN, UNSPECIFIED CHRONICITY: Primary | ICD-10-CM

## 2021-06-08 PROCEDURE — 1125F PR PAIN SEVERITY QUANTIFIED, PAIN PRESENT: ICD-10-PCS | Mod: S$GLB,,, | Performed by: FAMILY MEDICINE

## 2021-06-08 PROCEDURE — 99213 PR OFFICE/OUTPT VISIT, EST, LEVL III, 20-29 MIN: ICD-10-PCS | Mod: S$GLB,,, | Performed by: FAMILY MEDICINE

## 2021-06-08 PROCEDURE — 1125F AMNT PAIN NOTED PAIN PRSNT: CPT | Mod: S$GLB,,, | Performed by: FAMILY MEDICINE

## 2021-06-08 PROCEDURE — 3008F PR BODY MASS INDEX (BMI) DOCUMENTED: ICD-10-PCS | Mod: CPTII,S$GLB,, | Performed by: FAMILY MEDICINE

## 2021-06-08 PROCEDURE — 99213 OFFICE O/P EST LOW 20 MIN: CPT | Mod: S$GLB,,, | Performed by: FAMILY MEDICINE

## 2021-06-08 PROCEDURE — 73030 XR SHOULDER COMPLETE 2 OR MORE VIEWS BILATERAL: ICD-10-PCS | Mod: 26,50,, | Performed by: RADIOLOGY

## 2021-06-08 PROCEDURE — 3008F BODY MASS INDEX DOCD: CPT | Mod: CPTII,S$GLB,, | Performed by: FAMILY MEDICINE

## 2021-06-08 PROCEDURE — 99999 PR PBB SHADOW E&M-EST. PATIENT-LVL III: ICD-10-PCS | Mod: PBBFAC,,, | Performed by: FAMILY MEDICINE

## 2021-06-08 PROCEDURE — 73030 X-RAY EXAM OF SHOULDER: CPT | Mod: TC,50

## 2021-06-08 PROCEDURE — 99999 PR PBB SHADOW E&M-EST. PATIENT-LVL III: CPT | Mod: PBBFAC,,, | Performed by: FAMILY MEDICINE

## 2021-06-08 PROCEDURE — 73030 X-RAY EXAM OF SHOULDER: CPT | Mod: 26,50,, | Performed by: RADIOLOGY

## 2021-06-09 ENCOUNTER — PATIENT MESSAGE (OUTPATIENT)
Dept: DERMATOLOGY | Facility: CLINIC | Age: 36
End: 2021-06-09

## 2021-06-11 ENCOUNTER — OFFICE VISIT (OUTPATIENT)
Dept: DERMATOLOGY | Facility: CLINIC | Age: 36
End: 2021-06-11
Payer: COMMERCIAL

## 2021-06-11 DIAGNOSIS — L65.9 ALOPECIA OF SCALP: ICD-10-CM

## 2021-06-11 DIAGNOSIS — L70.0 ACNE VULGARIS: Primary | ICD-10-CM

## 2021-06-11 PROCEDURE — 1126F AMNT PAIN NOTED NONE PRSNT: CPT | Mod: S$GLB,,, | Performed by: DERMATOLOGY

## 2021-06-11 PROCEDURE — 1126F PR PAIN SEVERITY QUANTIFIED, NO PAIN PRESENT: ICD-10-PCS | Mod: S$GLB,,, | Performed by: DERMATOLOGY

## 2021-06-11 PROCEDURE — 99214 PR OFFICE/OUTPT VISIT, EST, LEVL IV, 30-39 MIN: ICD-10-PCS | Mod: S$GLB,,, | Performed by: DERMATOLOGY

## 2021-06-11 PROCEDURE — 99214 OFFICE O/P EST MOD 30 MIN: CPT | Mod: S$GLB,,, | Performed by: DERMATOLOGY

## 2021-06-11 RX ORDER — CLINDAMYCIN PHOSPHATE 11.9 MG/ML
SOLUTION TOPICAL 2 TIMES DAILY
Qty: 60 ML | Refills: 3 | Status: SHIPPED | OUTPATIENT
Start: 2021-06-11 | End: 2021-06-11

## 2021-06-11 RX ORDER — CLINDAMYCIN PHOSPHATE 11.9 MG/ML
SOLUTION TOPICAL 2 TIMES DAILY
Qty: 60 ML | Refills: 11 | Status: SHIPPED | OUTPATIENT
Start: 2021-06-11 | End: 2022-02-03 | Stop reason: SDUPTHER

## 2021-06-11 RX ORDER — B3/AZEL/QUER/TUR/FA/B6/ZN/COPP 700 MG-500
1 TABLET ORAL DAILY
Qty: 30 TABLET | Refills: 11 | Status: SHIPPED | OUTPATIENT
Start: 2021-06-11 | End: 2022-02-18

## 2021-06-25 RX ORDER — MELOXICAM 15 MG/1
15 TABLET ORAL DAILY
Qty: 30 TABLET | Refills: 0 | Status: SHIPPED | OUTPATIENT
Start: 2021-06-25 | End: 2021-09-17 | Stop reason: SDUPTHER

## 2021-07-30 ENCOUNTER — OFFICE VISIT (OUTPATIENT)
Dept: INTERNAL MEDICINE | Facility: CLINIC | Age: 36
End: 2021-07-30
Payer: COMMERCIAL

## 2021-07-30 VITALS
WEIGHT: 229.5 LBS | HEART RATE: 64 BPM | HEIGHT: 74 IN | DIASTOLIC BLOOD PRESSURE: 72 MMHG | SYSTOLIC BLOOD PRESSURE: 110 MMHG | BODY MASS INDEX: 29.45 KG/M2 | OXYGEN SATURATION: 98 %

## 2021-07-30 DIAGNOSIS — R29.898 WEAKNESS OF BOTH HIPS: ICD-10-CM

## 2021-07-30 DIAGNOSIS — M25.60 DECREASED RANGE OF MOTION WITH DECREASED STRENGTH: ICD-10-CM

## 2021-07-30 DIAGNOSIS — M53.86 DECREASED ROM OF LUMBAR SPINE: Primary | ICD-10-CM

## 2021-07-30 DIAGNOSIS — L65.9 ALOPECIA: Chronic | ICD-10-CM

## 2021-07-30 DIAGNOSIS — M51.36 DDD (DEGENERATIVE DISC DISEASE), LUMBAR: Chronic | ICD-10-CM

## 2021-07-30 DIAGNOSIS — R53.1 DECREASED RANGE OF MOTION WITH DECREASED STRENGTH: ICD-10-CM

## 2021-07-30 PROCEDURE — 3044F HG A1C LEVEL LT 7.0%: CPT | Mod: CPTII,S$GLB,, | Performed by: STUDENT IN AN ORGANIZED HEALTH CARE EDUCATION/TRAINING PROGRAM

## 2021-07-30 PROCEDURE — 3008F PR BODY MASS INDEX (BMI) DOCUMENTED: ICD-10-PCS | Mod: CPTII,S$GLB,, | Performed by: STUDENT IN AN ORGANIZED HEALTH CARE EDUCATION/TRAINING PROGRAM

## 2021-07-30 PROCEDURE — 1159F PR MEDICATION LIST DOCUMENTED IN MEDICAL RECORD: ICD-10-PCS | Mod: CPTII,S$GLB,, | Performed by: STUDENT IN AN ORGANIZED HEALTH CARE EDUCATION/TRAINING PROGRAM

## 2021-07-30 PROCEDURE — 99999 PR PBB SHADOW E&M-EST. PATIENT-LVL III: ICD-10-PCS | Mod: PBBFAC,,, | Performed by: STUDENT IN AN ORGANIZED HEALTH CARE EDUCATION/TRAINING PROGRAM

## 2021-07-30 PROCEDURE — 3074F PR MOST RECENT SYSTOLIC BLOOD PRESSURE < 130 MM HG: ICD-10-PCS | Mod: CPTII,S$GLB,, | Performed by: STUDENT IN AN ORGANIZED HEALTH CARE EDUCATION/TRAINING PROGRAM

## 2021-07-30 PROCEDURE — 3078F DIAST BP <80 MM HG: CPT | Mod: CPTII,S$GLB,, | Performed by: STUDENT IN AN ORGANIZED HEALTH CARE EDUCATION/TRAINING PROGRAM

## 2021-07-30 PROCEDURE — 1159F MED LIST DOCD IN RCRD: CPT | Mod: CPTII,S$GLB,, | Performed by: STUDENT IN AN ORGANIZED HEALTH CARE EDUCATION/TRAINING PROGRAM

## 2021-07-30 PROCEDURE — 3078F PR MOST RECENT DIASTOLIC BLOOD PRESSURE < 80 MM HG: ICD-10-PCS | Mod: CPTII,S$GLB,, | Performed by: STUDENT IN AN ORGANIZED HEALTH CARE EDUCATION/TRAINING PROGRAM

## 2021-07-30 PROCEDURE — 99215 PR OFFICE/OUTPT VISIT, EST, LEVL V, 40-54 MIN: ICD-10-PCS | Mod: S$GLB,,, | Performed by: STUDENT IN AN ORGANIZED HEALTH CARE EDUCATION/TRAINING PROGRAM

## 2021-07-30 PROCEDURE — 3008F BODY MASS INDEX DOCD: CPT | Mod: CPTII,S$GLB,, | Performed by: STUDENT IN AN ORGANIZED HEALTH CARE EDUCATION/TRAINING PROGRAM

## 2021-07-30 PROCEDURE — 3074F SYST BP LT 130 MM HG: CPT | Mod: CPTII,S$GLB,, | Performed by: STUDENT IN AN ORGANIZED HEALTH CARE EDUCATION/TRAINING PROGRAM

## 2021-07-30 PROCEDURE — 99999 PR PBB SHADOW E&M-EST. PATIENT-LVL III: CPT | Mod: PBBFAC,,, | Performed by: STUDENT IN AN ORGANIZED HEALTH CARE EDUCATION/TRAINING PROGRAM

## 2021-07-30 PROCEDURE — 1125F AMNT PAIN NOTED PAIN PRSNT: CPT | Mod: CPTII,S$GLB,, | Performed by: STUDENT IN AN ORGANIZED HEALTH CARE EDUCATION/TRAINING PROGRAM

## 2021-07-30 PROCEDURE — 1125F PR PAIN SEVERITY QUANTIFIED, PAIN PRESENT: ICD-10-PCS | Mod: CPTII,S$GLB,, | Performed by: STUDENT IN AN ORGANIZED HEALTH CARE EDUCATION/TRAINING PROGRAM

## 2021-07-30 PROCEDURE — 3044F PR MOST RECENT HEMOGLOBIN A1C LEVEL <7.0%: ICD-10-PCS | Mod: CPTII,S$GLB,, | Performed by: STUDENT IN AN ORGANIZED HEALTH CARE EDUCATION/TRAINING PROGRAM

## 2021-07-30 PROCEDURE — 99215 OFFICE O/P EST HI 40 MIN: CPT | Mod: S$GLB,,, | Performed by: STUDENT IN AN ORGANIZED HEALTH CARE EDUCATION/TRAINING PROGRAM

## 2021-07-30 RX ORDER — WITCH HAZEL 50 %
PADS, MEDICATED (EA) TOPICAL DAILY
COMMUNITY
End: 2022-02-18

## 2021-07-30 RX ORDER — MULTIVITAMIN
1 TABLET ORAL DAILY
COMMUNITY
End: 2023-01-27

## 2021-09-17 ENCOUNTER — PATIENT MESSAGE (OUTPATIENT)
Dept: ORTHOPEDICS | Facility: CLINIC | Age: 36
End: 2021-09-17

## 2021-12-08 ENCOUNTER — PATIENT MESSAGE (OUTPATIENT)
Dept: ORTHOPEDICS | Facility: CLINIC | Age: 36
End: 2021-12-08
Payer: COMMERCIAL

## 2021-12-08 ENCOUNTER — PATIENT MESSAGE (OUTPATIENT)
Dept: INTERNAL MEDICINE | Facility: CLINIC | Age: 36
End: 2021-12-08
Payer: COMMERCIAL

## 2021-12-08 DIAGNOSIS — M53.86 DECREASED ROM OF LUMBAR SPINE: ICD-10-CM

## 2021-12-08 RX ORDER — MELOXICAM 15 MG/1
15 TABLET ORAL DAILY
Qty: 30 TABLET | Refills: 0 | Status: SHIPPED | OUTPATIENT
Start: 2021-12-08 | End: 2022-02-03 | Stop reason: SDUPTHER

## 2022-01-11 DIAGNOSIS — Z00.00 ROUTINE GENERAL MEDICAL EXAMINATION AT A HEALTH CARE FACILITY: Primary | ICD-10-CM

## 2022-01-24 ENCOUNTER — OFFICE VISIT (OUTPATIENT)
Dept: URGENT CARE | Facility: CLINIC | Age: 37
End: 2022-01-24
Payer: COMMERCIAL

## 2022-01-24 VITALS
TEMPERATURE: 98 F | WEIGHT: 229 LBS | BODY MASS INDEX: 29.39 KG/M2 | RESPIRATION RATE: 18 BRPM | SYSTOLIC BLOOD PRESSURE: 116 MMHG | HEIGHT: 74 IN | HEART RATE: 67 BPM | DIASTOLIC BLOOD PRESSURE: 66 MMHG | OXYGEN SATURATION: 97 %

## 2022-01-24 DIAGNOSIS — S01.01XA SCALP LACERATION, INITIAL ENCOUNTER: Primary | ICD-10-CM

## 2022-01-24 PROCEDURE — 3078F DIAST BP <80 MM HG: CPT | Mod: CPTII,S$GLB,, | Performed by: INTERNAL MEDICINE

## 2022-01-24 PROCEDURE — 12001 RPR S/N/AX/GEN/TRNK 2.5CM/<: CPT | Mod: S$GLB,,, | Performed by: INTERNAL MEDICINE

## 2022-01-24 PROCEDURE — 90715 TDAP VACCINE 7 YRS/> IM: CPT | Mod: S$GLB,,, | Performed by: INTERNAL MEDICINE

## 2022-01-24 PROCEDURE — 1159F PR MEDICATION LIST DOCUMENTED IN MEDICAL RECORD: ICD-10-PCS | Mod: CPTII,S$GLB,, | Performed by: INTERNAL MEDICINE

## 2022-01-24 PROCEDURE — 3008F BODY MASS INDEX DOCD: CPT | Mod: CPTII,S$GLB,, | Performed by: INTERNAL MEDICINE

## 2022-01-24 PROCEDURE — 99203 OFFICE O/P NEW LOW 30 MIN: CPT | Mod: 25,S$GLB,, | Performed by: INTERNAL MEDICINE

## 2022-01-24 PROCEDURE — 1159F MED LIST DOCD IN RCRD: CPT | Mod: CPTII,S$GLB,, | Performed by: INTERNAL MEDICINE

## 2022-01-24 PROCEDURE — 3078F PR MOST RECENT DIASTOLIC BLOOD PRESSURE < 80 MM HG: ICD-10-PCS | Mod: CPTII,S$GLB,, | Performed by: INTERNAL MEDICINE

## 2022-01-24 PROCEDURE — 90715 TDAP VACCINE GREATER THAN OR EQUAL TO 7YO IM: ICD-10-PCS | Mod: S$GLB,,, | Performed by: INTERNAL MEDICINE

## 2022-01-24 PROCEDURE — 3074F SYST BP LT 130 MM HG: CPT | Mod: CPTII,S$GLB,, | Performed by: INTERNAL MEDICINE

## 2022-01-24 PROCEDURE — 3008F PR BODY MASS INDEX (BMI) DOCUMENTED: ICD-10-PCS | Mod: CPTII,S$GLB,, | Performed by: INTERNAL MEDICINE

## 2022-01-24 PROCEDURE — 90471 TDAP VACCINE GREATER THAN OR EQUAL TO 7YO IM: ICD-10-PCS | Mod: 59,S$GLB,, | Performed by: INTERNAL MEDICINE

## 2022-01-24 PROCEDURE — 99203 PR OFFICE/OUTPT VISIT, NEW, LEVL III, 30-44 MIN: ICD-10-PCS | Mod: 25,S$GLB,, | Performed by: INTERNAL MEDICINE

## 2022-01-24 PROCEDURE — 3074F PR MOST RECENT SYSTOLIC BLOOD PRESSURE < 130 MM HG: ICD-10-PCS | Mod: CPTII,S$GLB,, | Performed by: INTERNAL MEDICINE

## 2022-01-24 PROCEDURE — 90471 IMMUNIZATION ADMIN: CPT | Mod: 59,S$GLB,, | Performed by: INTERNAL MEDICINE

## 2022-01-24 PROCEDURE — 12001 LACERATION REPAIR: ICD-10-PCS | Mod: S$GLB,,, | Performed by: INTERNAL MEDICINE

## 2022-01-24 RX ORDER — MUPIROCIN 20 MG/G
OINTMENT TOPICAL 3 TIMES DAILY
Qty: 22 G | Refills: 0 | Status: SHIPPED | OUTPATIENT
Start: 2022-01-24 | End: 2022-01-24

## 2022-01-24 RX ORDER — MUPIROCIN 20 MG/G
OINTMENT TOPICAL 3 TIMES DAILY
Qty: 22 G | Refills: 0 | Status: SHIPPED | OUTPATIENT
Start: 2022-01-24 | End: 2022-02-18

## 2022-01-25 NOTE — PROCEDURES
Laceration Repair    Date/Time: 1/24/2022 6:15 PM  Performed by: Emile Lua MD  Authorized by: Emile Lua MD   Consent Done: Yes  Consent: Verbal consent obtained.  Risks and benefits: risks, benefits and alternatives were discussed  Patient identity confirmed: verbally with patient  Body area: head/neck  Location details: scalp  Laceration length: 1 cm  Foreign bodies: no foreign bodies  Tendon involvement: none  Nerve involvement: none  Vascular damage: no  Anesthesia: local infiltration    Anesthesia:  Local Anesthetic: lidocaine 1% with epinephrine  Anesthetic total: 2 mL  Irrigation solution: saline  Irrigation method: syringe  Amount of cleaning: standard  Debridement: none  Degree of undermining: none  Skin closure: staples  Number of sutures: 2  Technique: simple  Approximation: close  Approximation difficulty: simple  Dressing: antibiotic ointment  Patient tolerance: Patient tolerated the procedure well with no immediate complications

## 2022-01-25 NOTE — PROGRESS NOTES
"Subjective:       Patient ID: Lisa Garcia is a 36 y.o. male.    Vitals:  height is 6' 2" (1.88 m) and weight is 103.9 kg (229 lb). His oral temperature is 98.3 °F (36.8 °C). His blood pressure is 116/66 and his pulse is 67. His respiration is 18 and oxygen saturation is 97%.     Chief Complaint: Facial Laceration    Pt has a 1 cm laceration on hgis forehead. Pt states he was bending over to reach his freezer and hit his head on his refrigerator door handle. Pt is not actively bleeding. Laceration occurred at 5:00 pm today. Pt also states he suffers from folliculitis.    Laceration   The incident occurred 1 to 3 hours ago. The laceration is located on the scalp. The laceration is 1 cm in size. The laceration mechanism was a metal edge. The pain is at a severity of 3/10. The pain is mild. He reports no foreign bodies present.       Skin: Positive for laceration. Negative for erythema and bruising.       Objective:      Physical Exam   Constitutional: He is oriented to person, place, and time.  Non-toxic appearance. He does not appear ill. No distress. normal  HENT:   Head: Normocephalic and atraumatic.       Ears:   Right Ear: External ear normal.   Left Ear: External ear normal.   Nose: Nose normal.   Mouth/Throat: Mucous membranes are moist.   Small laceration on scalp just superior to hair line. Minimally gapping wound. No deeper structures identified. Easily closed with two staples.       Comments: Small laceration on scalp just superior to hair line. Minimally gapping wound. No deeper structures identified. Easily closed with two staples.   Eyes: Conjunctivae are normal. Pupils are equal, round, and reactive to light. Right eye exhibits no discharge. Left eye exhibits no discharge.      extraocular movement intact   Pulmonary/Chest: Effort normal. No respiratory distress.   Abdominal: Normal appearance.   Neurological: He is alert and oriented to person, place, and time.   Skin: Skin is not diaphoretic and not " pale. No bruising and No erythema jaundice  Psychiatric: His behavior is normal. Mood, judgment and thought content normal.     .        Assessment:       1. Scalp laceration, initial encounter          Plan:         Scalp laceration, initial encounter  -     Laceration Repair  -     Discontinue: mupirocin (BACTROBAN) 2 % ointment; Apply topically 3 (three) times daily.  Dispense: 22 g; Refill: 0  -     (In Office Administered) Tdap Vaccine  -     mupirocin (BACTROBAN) 2 % ointment; Apply topically 3 (three) times daily.  Dispense: 22 g; Refill: 0

## 2022-02-03 ENCOUNTER — CLINICAL SUPPORT (OUTPATIENT)
Dept: URGENT CARE | Facility: CLINIC | Age: 37
End: 2022-02-03
Payer: COMMERCIAL

## 2022-02-03 ENCOUNTER — PATIENT MESSAGE (OUTPATIENT)
Dept: DERMATOLOGY | Facility: CLINIC | Age: 37
End: 2022-02-03
Payer: COMMERCIAL

## 2022-02-03 ENCOUNTER — PATIENT MESSAGE (OUTPATIENT)
Dept: ORTHOPEDICS | Facility: CLINIC | Age: 37
End: 2022-02-03
Payer: COMMERCIAL

## 2022-02-03 VITALS
HEART RATE: 65 BPM | BODY MASS INDEX: 29.39 KG/M2 | TEMPERATURE: 98 F | HEIGHT: 74 IN | WEIGHT: 229 LBS | DIASTOLIC BLOOD PRESSURE: 74 MMHG | OXYGEN SATURATION: 96 % | SYSTOLIC BLOOD PRESSURE: 115 MMHG | RESPIRATION RATE: 16 BRPM

## 2022-02-03 DIAGNOSIS — L70.0 ACNE VULGARIS: ICD-10-CM

## 2022-02-03 DIAGNOSIS — Z48.02 ENCOUNTER FOR STAPLE REMOVAL: Primary | ICD-10-CM

## 2022-02-03 DIAGNOSIS — L65.9 ALOPECIA OF SCALP: ICD-10-CM

## 2022-02-03 PROCEDURE — 99212 PR OFFICE/OUTPT VISIT, EST, LEVL II, 10-19 MIN: ICD-10-PCS | Mod: S$GLB,,, | Performed by: NURSE PRACTITIONER

## 2022-02-03 PROCEDURE — 99212 OFFICE O/P EST SF 10 MIN: CPT | Mod: S$GLB,,, | Performed by: NURSE PRACTITIONER

## 2022-02-03 RX ORDER — CLINDAMYCIN PHOSPHATE 11.9 MG/ML
SOLUTION TOPICAL 2 TIMES DAILY
Qty: 60 ML | Refills: 5 | Status: SHIPPED | OUTPATIENT
Start: 2022-02-03 | End: 2022-03-07

## 2022-02-04 NOTE — PATIENT INSTRUCTIONS
Patient Education       Staple Removal   Why is this procedure done?   The doctor sometimes uses special metal staples to close a skin cut or wound. Staples let the skin heal the right way. The staples hold the skin cut together. Staples fix cuts on the outside of the skin. The doctor takes the staples out when the cut is healed all the way. The staples must come out since they are metal.  Most often, staples are taken out within 7 to 10 days. This will depend on how bad the cut is. It will also depend on where the cut is on your body. Staples that stay in too long may cause scars. They can also be hard to take out. The cut may open up again if the staples are not in long enough.     What happens during the procedure?   The doctor cleans the wound and uses a special tool to  the staple. This raises the staple away from the skin and opens it up. The tool pulls the staple out of the skin. The doctor will repeat this until all the staples are gone. Sometimes the doctor will only take out every other staple and remove the rest at a later time. The doctor will clean the cut site. Small strips or a special tape may be placed over the cut to protect it.  What care is needed at home?   · Leave the tape strips over the cut until they fall off or as directed by your doctor.  · Talk to your doctor about how to care for your wound. Ask your doctor about:  ? When you should change your bandages  ? When you may take a bath or shower  ? If you need to be careful with lifting things over 10 pounds (4.5 kg)  ? When you may go back to your normal activities like work or driving  What follow-up care is needed?   Your doctor may ask you to make visits to the office to check on your progress. Be sure to keep these visits.  What problems could happen?   · Infection  · Wound reopens  · Scarring  · Pain  · Large, firm scar tissue forms. This is a keloid and is more often seen in -Americans.  What can be done to prevent this  health problem?   · Be sure to follow up and have your staples taken out as directed.  · Follow proper wound care to avoid infection.  · Protect the wound from being reinjured.  · Certain health problems like high blood sugar or long-term steroid use may affect wound healing. Make sure you take all drugs as ordered by your doctor.  · Do not pull on or pick at the staples or tape strips.  When do I need to call the doctor?   · Signs of infection. These include a fever of 100.4°F (38°C) or higher, chills, wound that will not heal.  · Signs of wound infection. These include swelling, redness, warmth around the wound; too much pain when touched; yellowish, greenish, or bloody discharge; foul smell coming from the cut site; cut site opens up.  · Any of your staples come out before the time scheduled to take them out  Last Reviewed Date   2021-10-06  Consumer Information Use and Disclaimer   This information is not specific medical advice and does not replace information you receive from your health care provider. This is only a brief summary of general information. It does NOT include all information about conditions, illnesses, injuries, tests, procedures, treatments, therapies, discharge instructions or life-style choices that may apply to you. You must talk with your health care provider for complete information about your health and treatment options. This information should not be used to decide whether or not to accept your health care providers advice, instructions or recommendations. Only your health care provider has the knowledge and training to provide advice that is right for you.  Copyright   Copyright © 2021 [employer] and its affiliates and/or licensors. All rights reserved.

## 2022-02-04 NOTE — PROGRESS NOTES
"Subjective:       Patient ID: Lisa Garcia is a 36 y.o. male.    Vitals:  height is 6' 2" (1.88 m) and weight is 103.9 kg (229 lb). His tympanic temperature is 97.9 °F (36.6 °C). His blood pressure is 115/74 and his pulse is 65. His respiration is 16 and oxygen saturation is 96%.     Chief Complaint: Suture / Staple Removal    Patient here for Staple removal from 1/24/2022 (10 days ago).  No complaints.  Healing fine.    Suture / Staple Removal  The sutures were placed 11 to 14 days ago. He tried antibiotic ointment use since the wound repair. The treatment provided mild relief. His temperature was unmeasured prior to arrival. There has been no drainage from the wound. There is no redness present. There is no swelling present. There is no pain present.       Skin: Negative for erythema.       Objective:      Physical Exam   Constitutional: He is oriented to person, place, and time. He appears well-developed and well-nourished.   HENT:   Head: Normocephalic and atraumatic. Head is without abrasion, without contusion and without laceration.   Ears:   Right Ear: External ear normal.   Left Ear: External ear normal.   Nose: Nose normal.   Mouth/Throat: Oropharynx is clear and moist and mucous membranes are normal.   Eyes: Conjunctivae, EOM and lids are normal. Pupils are equal, round, and reactive to light.   Neck: Trachea normal and phonation normal. Neck supple.   Cardiovascular: Normal rate, regular rhythm and normal heart sounds.   Pulmonary/Chest: Effort normal and breath sounds normal. No stridor. No respiratory distress.   Musculoskeletal: Normal range of motion.         General: Normal range of motion.   Neurological: He is alert and oriented to person, place, and time.   Skin: Skin is warm, dry, intact and no rash. Capillary refill takes less than 2 seconds. No abrasion, No burn, No bruising, No erythema and No ecchymosis         Comments: 2 intact staples to frontal scalp   Psychiatric: He has a normal mood " and affect. His speech is normal and behavior is normal. Judgment and thought content normal. Cognition and memory  Nursing note and vitals reviewed.        Assessment:       1. Encounter for staple removal          Plan:         Encounter for staple removal  -     Suture Removal      Patient Instructions   Patient Education       Staple Removal   Why is this procedure done?   The doctor sometimes uses special metal staples to close a skin cut or wound. Staples let the skin heal the right way. The staples hold the skin cut together. Staples fix cuts on the outside of the skin. The doctor takes the staples out when the cut is healed all the way. The staples must come out since they are metal.  Most often, staples are taken out within 7 to 10 days. This will depend on how bad the cut is. It will also depend on where the cut is on your body. Staples that stay in too long may cause scars. They can also be hard to take out. The cut may open up again if the staples are not in long enough.     What happens during the procedure?   The doctor cleans the wound and uses a special tool to  the staple. This raises the staple away from the skin and opens it up. The tool pulls the staple out of the skin. The doctor will repeat this until all the staples are gone. Sometimes the doctor will only take out every other staple and remove the rest at a later time. The doctor will clean the cut site. Small strips or a special tape may be placed over the cut to protect it.  What care is needed at home?   · Leave the tape strips over the cut until they fall off or as directed by your doctor.  · Talk to your doctor about how to care for your wound. Ask your doctor about:  ? When you should change your bandages  ? When you may take a bath or shower  ? If you need to be careful with lifting things over 10 pounds (4.5 kg)  ? When you may go back to your normal activities like work or driving  What follow-up care is needed?   Your doctor  may ask you to make visits to the office to check on your progress. Be sure to keep these visits.  What problems could happen?   · Infection  · Wound reopens  · Scarring  · Pain  · Large, firm scar tissue forms. This is a keloid and is more often seen in -Americans.  What can be done to prevent this health problem?   · Be sure to follow up and have your staples taken out as directed.  · Follow proper wound care to avoid infection.  · Protect the wound from being reinjured.  · Certain health problems like high blood sugar or long-term steroid use may affect wound healing. Make sure you take all drugs as ordered by your doctor.  · Do not pull on or pick at the staples or tape strips.  When do I need to call the doctor?   · Signs of infection. These include a fever of 100.4°F (38°C) or higher, chills, wound that will not heal.  · Signs of wound infection. These include swelling, redness, warmth around the wound; too much pain when touched; yellowish, greenish, or bloody discharge; foul smell coming from the cut site; cut site opens up.  · Any of your staples come out before the time scheduled to take them out  Last Reviewed Date   2021-10-06  Consumer Information Use and Disclaimer   This information is not specific medical advice and does not replace information you receive from your health care provider. This is only a brief summary of general information. It does NOT include all information about conditions, illnesses, injuries, tests, procedures, treatments, therapies, discharge instructions or life-style choices that may apply to you. You must talk with your health care provider for complete information about your health and treatment options. This information should not be used to decide whether or not to accept your health care providers advice, instructions or recommendations. Only your health care provider has the knowledge and training to provide advice that is right for you.  Copyright   Copyright ©  2021 [employer] and its affiliates and/or licensors. All rights reserved.

## 2022-02-04 NOTE — PROCEDURES
Suture Removal    Date/Time: 2/3/2022 6:00 PM  Location procedure was performed: [facility]  Performed by: Maria De Jesus Lerma NP  Authorized by: Maria De Jesus Lerma NP   Body area: head/neck  Location details: scalp  Description of findings: well healed   Wound Appearance: clean, well healed, normal color, nontender, no drainage and nonpurulent  Staples Removed: 2  Facility: sutures placed in this facility  Complications: No  Estimated blood loss (mL): 0  Specimens: No  Implants: No  Patient tolerance: Patient tolerated the procedure well with no immediate complications

## 2022-02-18 ENCOUNTER — HOSPITAL ENCOUNTER (OUTPATIENT)
Dept: CARDIOLOGY | Facility: CLINIC | Age: 37
Discharge: HOME | End: 2022-02-18
Payer: COMMERCIAL

## 2022-02-18 ENCOUNTER — HOSPITAL ENCOUNTER (OUTPATIENT)
Dept: RADIOLOGY | Facility: HOSPITAL | Age: 37
Discharge: HOME | End: 2022-02-18
Attending: INTERNAL MEDICINE
Payer: COMMERCIAL

## 2022-02-18 ENCOUNTER — CLINICAL SUPPORT (OUTPATIENT)
Dept: INTERNAL MEDICINE | Facility: CLINIC | Age: 37
End: 2022-02-18
Payer: COMMERCIAL

## 2022-02-18 ENCOUNTER — OFFICE VISIT (OUTPATIENT)
Dept: INTERNAL MEDICINE | Facility: CLINIC | Age: 37
End: 2022-02-18
Payer: COMMERCIAL

## 2022-02-18 VITALS
HEIGHT: 74 IN | DIASTOLIC BLOOD PRESSURE: 73 MMHG | WEIGHT: 227.13 LBS | SYSTOLIC BLOOD PRESSURE: 115 MMHG | HEART RATE: 60 BPM | BODY MASS INDEX: 29.15 KG/M2

## 2022-02-18 DIAGNOSIS — Z00.00 ROUTINE GENERAL MEDICAL EXAMINATION AT A HEALTH CARE FACILITY: ICD-10-CM

## 2022-02-18 DIAGNOSIS — Z00.00 ROUTINE GENERAL MEDICAL EXAMINATION AT A HEALTH CARE FACILITY: Primary | ICD-10-CM

## 2022-02-18 DIAGNOSIS — Z00.00 ENCOUNTER FOR ANNUAL HEALTH EXAMINATION: Primary | ICD-10-CM

## 2022-02-18 LAB
ALBUMIN SERPL BCP-MCNC: 3.9 G/DL (ref 3.5–5.2)
ALP SERPL-CCNC: 47 U/L (ref 55–135)
ALT SERPL W/O P-5'-P-CCNC: 21 U/L (ref 10–44)
ANION GAP SERPL CALC-SCNC: 8 MMOL/L (ref 8–16)
AST SERPL-CCNC: 22 U/L (ref 10–40)
BILIRUB SERPL-MCNC: 0.7 MG/DL (ref 0.1–1)
BUN SERPL-MCNC: 14 MG/DL (ref 6–20)
CALCIUM SERPL-MCNC: 9.2 MG/DL (ref 8.7–10.5)
CHLORIDE SERPL-SCNC: 107 MMOL/L (ref 95–110)
CHOLEST SERPL-MCNC: 172 MG/DL (ref 120–199)
CHOLEST/HDLC SERPL: 3.7 {RATIO} (ref 2–5)
CO2 SERPL-SCNC: 25 MMOL/L (ref 23–29)
CREAT SERPL-MCNC: 1 MG/DL (ref 0.5–1.4)
ERYTHROCYTE [DISTWIDTH] IN BLOOD BY AUTOMATED COUNT: 12.1 % (ref 11.5–14.5)
EST. GFR  (AFRICAN AMERICAN): >60 ML/MIN/1.73 M^2
EST. GFR  (NON AFRICAN AMERICAN): >60 ML/MIN/1.73 M^2
ESTIMATED AVG GLUCOSE: 100 MG/DL (ref 68–131)
GLUCOSE SERPL-MCNC: 105 MG/DL (ref 70–110)
HBA1C MFR BLD: 5.1 % (ref 4–5.6)
HCT VFR BLD AUTO: 44.2 % (ref 40–54)
HDLC SERPL-MCNC: 47 MG/DL (ref 40–75)
HDLC SERPL: 27.3 % (ref 20–50)
HGB BLD-MCNC: 15.4 G/DL (ref 14–18)
LDLC SERPL CALC-MCNC: 110.4 MG/DL (ref 63–159)
MCH RBC QN AUTO: 28.4 PG (ref 27–31)
MCHC RBC AUTO-ENTMCNC: 34.8 G/DL (ref 32–36)
MCV RBC AUTO: 82 FL (ref 82–98)
NONHDLC SERPL-MCNC: 125 MG/DL
PLATELET # BLD AUTO: 179 K/UL (ref 150–450)
PMV BLD AUTO: 11.5 FL (ref 9.2–12.9)
POTASSIUM SERPL-SCNC: 4.2 MMOL/L (ref 3.5–5.1)
PROT SERPL-MCNC: 7 G/DL (ref 6–8.4)
RBC # BLD AUTO: 5.42 M/UL (ref 4.6–6.2)
SODIUM SERPL-SCNC: 140 MMOL/L (ref 136–145)
TRIGL SERPL-MCNC: 73 MG/DL (ref 30–150)
TSH SERPL DL<=0.005 MIU/L-ACNC: 1.32 UIU/ML (ref 0.4–4)
WBC # BLD AUTO: 4.32 K/UL (ref 3.9–12.7)

## 2022-02-18 PROCEDURE — 3008F PR BODY MASS INDEX (BMI) DOCUMENTED: ICD-10-PCS | Mod: CPTII,S$GLB,, | Performed by: INTERNAL MEDICINE

## 2022-02-18 PROCEDURE — 99395 PREV VISIT EST AGE 18-39: CPT | Mod: S$GLB,,, | Performed by: INTERNAL MEDICINE

## 2022-02-18 PROCEDURE — 93005 ELECTROCARDIOGRAM TRACING: CPT | Mod: S$GLB,,, | Performed by: INTERNAL MEDICINE

## 2022-02-18 PROCEDURE — 99999 PR PBB SHADOW E&M-EST. PATIENT-LVL III: CPT | Mod: PBBFAC,,, | Performed by: INTERNAL MEDICINE

## 2022-02-18 PROCEDURE — 99999 PR PBB SHADOW E&M-EST. PATIENT-LVL III: ICD-10-PCS | Mod: PBBFAC,,, | Performed by: INTERNAL MEDICINE

## 2022-02-18 PROCEDURE — 3008F BODY MASS INDEX DOCD: CPT | Mod: CPTII,S$GLB,, | Performed by: INTERNAL MEDICINE

## 2022-02-18 PROCEDURE — 80053 COMPREHEN METABOLIC PANEL: CPT | Performed by: INTERNAL MEDICINE

## 2022-02-18 PROCEDURE — 71046 X-RAY EXAM CHEST 2 VIEWS: CPT | Mod: 26,,, | Performed by: RADIOLOGY

## 2022-02-18 PROCEDURE — 97750 PHYSICAL PERFORMANCE TEST: CPT | Mod: S$GLB,,, | Performed by: INTERNAL MEDICINE

## 2022-02-18 PROCEDURE — 97802 MEDICAL NUTRITION INDIV IN: CPT | Mod: S$GLB,,, | Performed by: INTERNAL MEDICINE

## 2022-02-18 PROCEDURE — 3074F SYST BP LT 130 MM HG: CPT | Mod: CPTII,S$GLB,, | Performed by: INTERNAL MEDICINE

## 2022-02-18 PROCEDURE — 3078F DIAST BP <80 MM HG: CPT | Mod: CPTII,S$GLB,, | Performed by: INTERNAL MEDICINE

## 2022-02-18 PROCEDURE — 3078F PR MOST RECENT DIASTOLIC BLOOD PRESSURE < 80 MM HG: ICD-10-PCS | Mod: CPTII,S$GLB,, | Performed by: INTERNAL MEDICINE

## 2022-02-18 PROCEDURE — 99999 PR PBB SHADOW E&M-EST. PATIENT-LVL I: ICD-10-PCS | Mod: PBBFAC,,,

## 2022-02-18 PROCEDURE — 97802 PR MED NUTR THER, 1ST, INDIV, EA 15 MIN: ICD-10-PCS | Mod: S$GLB,,, | Performed by: INTERNAL MEDICINE

## 2022-02-18 PROCEDURE — 99999 PR PBB SHADOW E&M-EST. PATIENT-LVL I: CPT | Mod: PBBFAC,,,

## 2022-02-18 PROCEDURE — 80061 LIPID PANEL: CPT | Performed by: INTERNAL MEDICINE

## 2022-02-18 PROCEDURE — 99395 PR PREVENTIVE VISIT,EST,18-39: ICD-10-PCS | Mod: S$GLB,,, | Performed by: INTERNAL MEDICINE

## 2022-02-18 PROCEDURE — 93010 ELECTROCARDIOGRAM REPORT: CPT | Mod: S$GLB,,, | Performed by: INTERNAL MEDICINE

## 2022-02-18 PROCEDURE — 71046 XR CHEST PA AND LATERAL: ICD-10-PCS | Mod: 26,,, | Performed by: RADIOLOGY

## 2022-02-18 PROCEDURE — 84443 ASSAY THYROID STIM HORMONE: CPT | Performed by: INTERNAL MEDICINE

## 2022-02-18 PROCEDURE — 3044F HG A1C LEVEL LT 7.0%: CPT | Mod: CPTII,S$GLB,, | Performed by: INTERNAL MEDICINE

## 2022-02-18 PROCEDURE — 3074F PR MOST RECENT SYSTOLIC BLOOD PRESSURE < 130 MM HG: ICD-10-PCS | Mod: CPTII,S$GLB,, | Performed by: INTERNAL MEDICINE

## 2022-02-18 PROCEDURE — 83036 HEMOGLOBIN GLYCOSYLATED A1C: CPT | Performed by: INTERNAL MEDICINE

## 2022-02-18 PROCEDURE — 93005 EKG 12-LEAD: ICD-10-PCS | Mod: S$GLB,,, | Performed by: INTERNAL MEDICINE

## 2022-02-18 PROCEDURE — 97750 PR PHYSICAL PERFORMANCE TEST: ICD-10-PCS | Mod: S$GLB,,, | Performed by: INTERNAL MEDICINE

## 2022-02-18 PROCEDURE — 85027 COMPLETE CBC AUTOMATED: CPT | Performed by: INTERNAL MEDICINE

## 2022-02-18 PROCEDURE — 71046 X-RAY EXAM CHEST 2 VIEWS: CPT | Mod: TC,FY

## 2022-02-18 PROCEDURE — 3044F PR MOST RECENT HEMOGLOBIN A1C LEVEL <7.0%: ICD-10-PCS | Mod: CPTII,S$GLB,, | Performed by: INTERNAL MEDICINE

## 2022-02-18 PROCEDURE — 93010 EKG 12-LEAD: ICD-10-PCS | Mod: S$GLB,,, | Performed by: INTERNAL MEDICINE

## 2022-02-18 NOTE — LETTER
2/18/2022    Lisa Garcia  [address]  [facility] [address]       [facility]  [address], [address]  [facility] [address]  Phone: [phone]  Fax: [phone] Dear Mr. Garcia:    Thank you for allowing me to serve you and perform your Executive Health exam on 2/18/2022.  This letter will serve as a brief summary of our discussions at that time.      Past Medical History:   Diagnosis Date    Alopecia of scalp 2009    DDD (degenerative disc disease), lumbar     L4/5    Ganglion cyst     Left hand, b/t 1st/2nd MCP.    Greater trochanteric bursitis, right     Patellofemoral pain syndrome of left knee     Tendinosis of rotator cuff     Right       Past Surgical History:   Procedure Laterality Date    APPENDECTOMY         Immunization History   Administered Date(s) Administered    COVID-19, MRNA, LN-S, PF (MODERNA FULL 0.5 ML DOSE) 03/09/2021, 04/06/2021    COVID-19, MRNA, LN-S, PF (Pfizer) (Purple Cap) 12/16/2021    Influenza 10/09/2019, 11/09/2020    Td (Adult), Unspecified Formulation 05/19/2015    Tdap 01/24/2022       Assessment/Recommendations:    Health Maintenance updates:  - Blood pressure, glucose and cholesterol all at normal levels.     Lumbar joint disease (left sciatica), Right rotator cuff tendinosis, Left patellofemoral syndrome  - Stable, improving with exercises. Using Mobic as needed, but Advil as well. Switch Advil to Tylenol 1000 mg three times daily as needed as an adjunct to Mobic. Transitioning away from running to more low impact cardio such as swimming.     Alopecia of scalp, recurrent folliculitis (crown) - Using Clindamycin topical which seems to be controlling flairs currently. Following with Dermatology.      It was a pleasure seeing you for your health exam, Lisa. Unless noted above, I did not find any other concerning abnormalities in your history, physical exam or lab work.    If you have any questions or  concerns, please don't hesitate to call.    Sincerely,    Leon Painting MD

## 2022-02-18 NOTE — PROGRESS NOTES
Nutrition Assessment  Session Time:  60 minutes      Client name:  Lisa Garcia  :  1985  Age:  36 y.o.  Gender:  male    Client states he is here for his annual Executive Health medical examination. Mr. Garcia is familiar to me as I've seen him before for nutrition counseling. He shares today he has been working on healthy habits for weight loss after some weight gain this past year (). Last year, he was gaining weight with the stress leading up to his wedding and got  in October at his heaviest weight (235 pounds). He decided shortly after to re-start a physical activity routine. He is back to the weight he was when I saw him last. He acknowledges he may need to control his splurges (eating out) and is looking for recommendations for snacks and meals. PMH DDD.    Anthropometrics  Height:  74 inches     Weight:  225.8 pounds  BMI:  29.0  % Body Fat:  21.0    Clinical Signs/Symptoms  N/V/D:  none  Appetite:  good       Past Medical History:   Diagnosis Date    Alopecia of scalp     DDD (degenerative disc disease), lumbar     L4/5    Ganglion cyst     Left hand, b/t 1st/2nd MCP.    Greater trochanteric bursitis, right     Tendinosis of rotator cuff     Right       Past Surgical History:   Procedure Laterality Date    APPENDECTOMY         Medications    has a current medication list which includes the following prescription(s): clindamycin, meloxicam, and multivitamin.    Vitamins, Minerals, and/or Supplements:  Pre/post workout, BCAAs, probiotics, enzymes, Omega-3 , Vit C, multivitamin    Food/Medication Interactions:  Reviewed     Food Allergies or Intolerances:  NKFA     Social History    Marital status:    Employment:  [employer]    Social History     Tobacco Use    Smoking status: Current Some Day Smoker     Types: Cigars    Smokeless tobacco: Never Used    Tobacco comment: once every few months   Substance Use Topics    Alcohol use: Yes     Alcohol/week: 5.0 standard drinks      Types: 5 Standard drinks or equivalent per week     Comment: Social, scotch, bourbon        Lab Reports   Sodium   Date Value Ref Range Status   02/18/2022 140 136 - 145 mmol/L Final     Potassium   Date Value Ref Range Status   02/18/2022 4.2 3.5 - 5.1 mmol/L Final     Chloride   Date Value Ref Range Status   02/18/2022 107 95 - 110 mmol/L Final     CO2   Date Value Ref Range Status   02/18/2022 25 23 - 29 mmol/L Final     Glucose   Date Value Ref Range Status   02/18/2022 105 70 - 110 mg/dL Final     BUN   Date Value Ref Range Status   02/18/2022 14 6 - 20 mg/dL Final     Creatinine   Date Value Ref Range Status   02/18/2022 1.0 0.5 - 1.4 mg/dL Final     Calcium   Date Value Ref Range Status   02/18/2022 9.2 8.7 - 10.5 mg/dL Final     Total Protein   Date Value Ref Range Status   02/18/2022 7.0 6.0 - 8.4 g/dL Final     Albumin   Date Value Ref Range Status   02/18/2022 3.9 3.5 - 5.2 g/dL Final     Total Bilirubin   Date Value Ref Range Status   02/18/2022 0.7 0.1 - 1.0 mg/dL Final     Comment:     For infants and newborns, interpretation of results should be based  on gestational age, weight and in agreement with clinical  observations.    Premature Infant recommended reference ranges:  Up to 24 hours.............<8.0 mg/dL  Up to 48 hours............<12.0 mg/dL  3-5 days..................<15.0 mg/dL  6-29 days.................<15.0 mg/dL       Alkaline Phosphatase   Date Value Ref Range Status   02/18/2022 47 (L) 55 - 135 U/L Final     AST   Date Value Ref Range Status   02/18/2022 22 10 - 40 U/L Final     ALT   Date Value Ref Range Status   02/18/2022 21 10 - 44 U/L Final     Anion Gap   Date Value Ref Range Status   02/18/2022 8 8 - 16 mmol/L Final     eGFR if    Date Value Ref Range Status   02/18/2022 >60.0 >60 mL/min/1.73 m^2 Final     eGFR if non    Date Value Ref Range Status   02/18/2022 >60.0 >60 mL/min/1.73 m^2 Final     Comment:     Calculation used to obtain the  estimated glomerular filtration  rate (eGFR) is the CKD-EPI equation.         Lab Results   Component Value Date    WBC 4.82 02/05/2021    HGB 15.4 02/05/2021    HCT 46.0 02/05/2021    MCV 83 02/05/2021     02/05/2021        Lab Results   Component Value Date    CHOL 172 02/18/2022     Lab Results   Component Value Date    HDL 47 02/18/2022     Lab Results   Component Value Date    LDLCALC 110.4 02/18/2022     Lab Results   Component Value Date    TRIG 73 02/18/2022     Lab Results   Component Value Date    CHOLHDL 27.3 02/18/2022     Lab Results   Component Value Date    HGBA1C 5.1 02/18/2022     BP Readings from Last 1 Encounters:   02/18/22 115/73       Food History  Breakfast:  Protein shake + greens and 1/4 avocado + 1 coffee (espresso w oat milk (2oz) + agave (squirt))  Mid-morning Snack:  Handful of almonds + 1 date + water  Lunch:  Poke bowls (tuna) w brown rice, cucumber, radish, avocado, tamari, sesame oil / chicken + veggies (salad/cucumbers/olives/broccolini) / beef seldom / [employer] K[employer] bread/bagel (plain) + water  Mid-afternoon Snack:  Fage (0% fat) + blueberries, raspberries, blackberrries / apple / hummus w whole grain crackers  Dinner:  [place] on salad dressed with lemon juice, olive oil, salt+pepper, dill / Meat and vegetables (no carbs)  H.S. Snack:  none  *Fluid intake:  Coffee, water, pre/post workout, BCAAs, Liquid IV, Vit C, alcohol (3-4 drinks a week- bourbon/whiskey)    Exercise History:  Swim 2x a week, bike riding 1-2x a week, gym 3 days a week (strength-training)    Cultural/Spiritual/Personal Preferences:  None identified    Support System:  spouse    State of Change:  Preparation    Barriers to Change:  none    Diagnosis    Excessive energy intake related to consistent high-calorie splurges as evidenced by hx of unintended weight gain.    Intervention    RMR (Method:  InBody):  2118 kcal  Activity Factor:  1.3    VICKY:  2753 kcal - 500 (wt loss) = 2300 kcal    Goals:  1.   Continue current diet and physical activity routine  2.  Focus on limiting high quantities of calorie-dense foods (fats and oils)  3.  Limit amount of eating out to once a week  4.  Weight < 227 pounds    Nutrition Education  The following education was provided to the patient:  Complimented patient on proactive role in health maintenance.  Complimented patient on physical activity efforts.  Discussed meal planning/USDA's My Plate design.  Discussed weight management.  Discussed Heart Healthy Eating.  Suggested dietary modifications based on current dietary behaviors and individual food preferences.  Discussed tips for eating healthy when dining out.  Discussed nutrition-related lab values and dietary and/or lifestyle factors affecting them.  Discussed recommended servings of non-starchy vegetables/day and food sources of such.  Discussed benefits of adequate hydration and recommended fluid intake.  Discussed [facility] client resources (may include but not limited to [facility] Eat Fit Shopping List, Fast Food Guide, Lite Restaurant Guide, and Meal Planning Guide).  Discussed importance of small behavior/habit changes in improving long-term adherence and sustainability.  Discussed goal setting.  Provided ongoing support, encouragement, and guidance toward improved health efforts.  Patient verbalized understanding of nutrition education and recommendations received.    Handouts Provided  Meal Planning Guide  Restaurant Guide  Eat Fit Shopping List  Eat Fit Virginie  Fueling Well On-The-Go  Vitamin/Mineral Guide    Monitoring/Evaluation    Monitor the following:  Weight  BMI  % Body Fat  Caloric intake  Labs:  Alk Phos    Follow Up Plan:  Communication with referring healthcare provider is unnecessary at this time as patient presented as part of annual wellness exam.  However, will follow up with patient in 1-2 years.

## 2022-02-18 NOTE — PROGRESS NOTES
Subjective:       Patient ID: Lisa Garcia is a 36 y.o. male.    Chief Complaint: Executive Health      HPI  Annual health exam. Reviewed medical, surgical, social and family history, medications, appropriate preventive health screenings, as well as vaccination history. Updates as noted below or in assessment and plan.    Working from home but will be back in office soon.  this past October 2021. Similar wt to last year (227 lbs). Goal to get down to around 209 currently. Exercises regularly and watching diet still. Stable but intermittent pains of right rotator cuff, lumbar with left sciatica, left frontal knee.    Review of Systems   Constitutional: Negative for chills, fatigue and fever.   HENT: Negative for hearing loss and sinus pain.    Eyes: Negative for visual disturbance.   Respiratory: Negative for cough, chest tightness and shortness of breath.    Cardiovascular: Negative for chest pain and leg swelling.   Gastrointestinal: Negative for abdominal pain, blood in stool, diarrhea and nausea.   Genitourinary: Negative for difficulty urinating, dysuria and frequency.   Musculoskeletal: Positive for arthralgias. Negative for back pain, gait problem, joint swelling, myalgias and neck pain.   Skin: Negative for rash and wound.   Neurological: Negative for dizziness, syncope, weakness, numbness and headaches.   Psychiatric/Behavioral: Negative for dysphoric mood. The patient is not nervous/anxious.        Past Medical History:   Diagnosis Date    Alopecia of scalp 2009    DDD (degenerative disc disease), lumbar     L4/5    Ganglion cyst     Left hand, b/t 1st/2nd MCP.    Greater trochanteric bursitis, right     Tendinosis of rotator cuff     Right         Current Outpatient Medications:     clindamycin (CLEOCIN T) 1 % external solution, Apply topically 2 (two) times daily., Disp: 60 mL, Rfl: 5    meloxicam (MOBIC) 15 MG tablet, Take 1 tablet (15 mg total) by mouth once daily., Disp: 30 tablet, Rfl:  0    multivitamin (THERAGRAN) per tablet, Take 1 tablet by mouth once daily., Disp: , Rfl:     Past Surgical History:   Procedure Laterality Date    APPENDECTOMY         Family History   Problem Relation Age of Onset    Osteoporosis Father         s/p bilat hip replacement    Hypothyroidism Mother         hashimotos    Lung cancer Paternal Grandfather         smoker/filterless cig    Colon cancer Neg Hx     Melanoma Neg Hx     Prostate cancer Neg Hx        Social History     Tobacco Use    Smoking status: Current Some Day Smoker     Types: Cigars    Smokeless tobacco: Never Used    Tobacco comment: once every few months   Substance Use Topics    Alcohol use: Yes     Alcohol/week: 5.0 standard drinks     Types: 5 Standard drinks or equivalent per week     Comment: Social, scotch, bourbon    Drug use: Yes     Types: Marijuana     Comment: rare       Immunization History   Administered Date(s) Administered    COVID-19, MRNA, LN-S, PF (MODERNA FULL 0.5 ML DOSE) 03/09/2021, 04/06/2021    COVID-19, MRNA, LN-S, PF (Pfizer) (Purple Cap) 12/16/2021    Influenza 10/09/2019, 11/09/2020    Td (Adult), Unspecified Formulation 05/19/2015    Tdap 01/24/2022         Objective:      Vitals:    02/18/22 0830   BP: 115/73   Pulse: 60       Physical Exam  Constitutional:       General: He is not in acute distress.     Appearance: Normal appearance. He is well-developed. He is not ill-appearing.   HENT:      Head: Normocephalic and atraumatic.      Right Ear: Hearing and tympanic membrane normal. There is no impacted cerumen.      Left Ear: Hearing and tympanic membrane normal. There is no impacted cerumen.   Eyes:      Extraocular Movements: Extraocular movements intact.      Conjunctiva/sclera: Conjunctivae normal.      Pupils: Pupils are equal, round, and reactive to light.   Cardiovascular:      Rate and Rhythm: Normal rate and regular rhythm.      Heart sounds: Normal heart sounds. No murmur heard.      Pulmonary:       Effort: Pulmonary effort is normal. No respiratory distress.      Breath sounds: Normal breath sounds. No wheezing, rhonchi or rales.   Abdominal:      General: Abdomen is flat. There is no distension.      Palpations: Abdomen is soft.   Musculoskeletal:         General: No swelling, deformity or signs of injury. Normal range of motion.      Cervical back: Normal range of motion. No tenderness.      Right lower leg: No edema.      Left lower leg: No edema.   Lymphadenopathy:      Cervical: No cervical adenopathy.   Skin:     General: Skin is warm and dry.      Findings: No lesion or rash.   Neurological:      General: No focal deficit present.      Mental Status: He is alert and oriented to person, place, and time.      Cranial Nerves: No cranial nerve deficit.      Coordination: Coordination normal.      Gait: Gait normal.      Deep Tendon Reflexes: Reflexes normal.   Psychiatric:         Mood and Affect: Mood normal.         Behavior: Behavior normal.         Thought Content: Thought content normal.         Judgment: Judgment normal.         Recent Results (from the past 2016 hour(s))   Comprehensive metabolic panel    Collection Time: 02/18/22  8:00 AM   Result Value Ref Range    Sodium 140 136 - 145 mmol/L    Potassium 4.2 3.5 - 5.1 mmol/L    Chloride 107 95 - 110 mmol/L    CO2 25 23 - 29 mmol/L    Glucose 105 70 - 110 mg/dL    BUN 14 6 - 20 mg/dL    Creatinine 1.0 0.5 - 1.4 mg/dL    Calcium 9.2 8.7 - 10.5 mg/dL    Total Protein 7.0 6.0 - 8.4 g/dL    Albumin 3.9 3.5 - 5.2 g/dL    Total Bilirubin 0.7 0.1 - 1.0 mg/dL    Alkaline Phosphatase 47 (L) 55 - 135 U/L    AST 22 10 - 40 U/L    ALT 21 10 - 44 U/L    Anion Gap 8 8 - 16 mmol/L    eGFR if African American >60.0 >60 mL/min/1.73 m^2    eGFR if non African American >60.0 >60 mL/min/1.73 m^2   Lipid panel    Collection Time: 02/18/22  8:00 AM   Result Value Ref Range    Cholesterol 172 120 - 199 mg/dL    Triglycerides 73 30 - 150 mg/dL    HDL 47 40 - 75 mg/dL     LDL Cholesterol 110.4 63.0 - 159.0 mg/dL    HDL/Cholesterol Ratio 27.3 20.0 - 50.0 %    Total Cholesterol/HDL Ratio 3.7 2.0 - 5.0    Non-HDL Cholesterol 125 mg/dL   Hemoglobin A1c    Collection Time: 02/18/22  8:00 AM   Result Value Ref Range    Hemoglobin A1C 5.1 4.0 - 5.6 %    Estimated Avg Glucose 100 68 - 131 mg/dL          Assessment/Plan:     1) Health Maintenance updates:  - BP, lipids, a1c wnl    2) Lumbar DJD (left sciatica), Right rotator cuff tendinosis, Left patellofemoral syndrome  - Stable, improving with exercises. Uses Mobic prn, but Advil as well. Discussed switching Advil to Tylenol prn as an adjunct to Mobic. Discussed transitioning away from running to more low impact cardio, he has added swimming to his exercise routine recently.    3) Alopecia of scalp, recurrent folliculitis (crown) - Using Clindamycin topical which seems to be controlled flairs currently. Follows with Derm.

## 2022-02-18 NOTE — PROGRESS NOTES
Subjective:       Patient ID: Lisa Garcia is a 36 y.o. male.    Chief Complaint: No chief complaint on file.    HPI   Pt. Has no significant cardiovascular or pulmonary history.    Physical Limitations:  Patient has a degenerative disc at L4/L5 that limits him from heavy lifting and performing back specific exercises such as dead lifts.  Patient has right shoulder tendonitis that limits him from performing overhead and incline bench press.  Patient completed physical therapy and continues to practice his prescribed core strengthening and shoulder exercises.      Current exercise routine:  Patient currently swims for 60 minutes, 2 days a week.  Patient bikes for 60-90 minutes, 1-2 days a week.  Patient goes to the gym 3-4 days a week where he runs on the treadmill for 20 minutes, performs full-body resistance training exercises, and stretches.    Goals:  Patient set a goal weight of 209 lbs.    Fun Facts:  Patient was very friendly and engaged.  Patient got  in October and noted that planning the wedding disrupted his exercise routine.  Patient added swimming into his routine and enjoys the challenge with it's low-impact nature.  Patient was very pleased with his improvements in flexibility and seems motivated to keep active and fit.  Patient asked questions and was receptive to all recommendations made.      Review of Systems      Objective:     The fitness evaluation results are as follows:  D.O.S. 2/18/2022 2/5/2021   Height (in): 74 74   Weight (lbs): 225.8 227.2   BMI: 29.545268 29.666241   Body Fat (%): 21.00 20.70   Waist (cm): 93 96   Hip (cm): 108 111   WHR: 0.86 0.86   RBP (mmHg): 92/60 110/66   RHR (bpm): 54 62    Strength R (lbs)t: 120 130    Strength Lt (lbs): 126.20745 133.51835   Push-up Assessment: 46 46   Curl-up Assessment: 75 75   Flexibility Testing (cm): 20 -9   REE (kcals): 9748 2135       Physical Exam    Assessment:     Age/gender stratified assessment:  Resting BP: Within  Normal Limits   Body Fat %: Good   WHR Risk Factor: Low Risk    Strength R: Average    Strength L: Average   Upper Body Endurance: Excellent   Abdominal Endurance: Well Above Average   Lower body Flexibiltiy: Needs Improvement       Problem List Items Addressed This Visit    None     Visit Diagnoses     Routine general medical examination at a health care facility    -  Primary          Plan:       Recommended fitness guidelines:    -150 minutes of moderate intensity aerobic exercise per week or 75 minutes of vigorous intensity aerobic exercise per week.    -2 to 4 days per week of resistance training for each muscle group.      -Daily stretching with a hold of at least 30 seconds per muscle group.

## 2022-03-28 ENCOUNTER — PATIENT MESSAGE (OUTPATIENT)
Dept: INTERNAL MEDICINE | Facility: CLINIC | Age: 37
End: 2022-03-28
Payer: COMMERCIAL

## 2022-03-29 ENCOUNTER — TELEPHONE (OUTPATIENT)
Dept: PAIN MEDICINE | Facility: CLINIC | Age: 37
End: 2022-03-29
Payer: COMMERCIAL
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
## 2022-03-30 ENCOUNTER — OFFICE VISIT (OUTPATIENT)
Dept: URGENT CARE | Facility: CLINIC | Age: 37
End: 2022-03-30
Payer: COMMERCIAL

## 2022-03-30 VITALS
RESPIRATION RATE: 19 BRPM | BODY MASS INDEX: 29.13 KG/M2 | SYSTOLIC BLOOD PRESSURE: 140 MMHG | HEIGHT: 74 IN | HEART RATE: 82 BPM | DIASTOLIC BLOOD PRESSURE: 78 MMHG | OXYGEN SATURATION: 98 % | TEMPERATURE: 99 F | WEIGHT: 227 LBS

## 2022-03-30 DIAGNOSIS — G44.319 ACUTE POST-TRAUMATIC HEADACHE, NOT INTRACTABLE: ICD-10-CM

## 2022-03-30 DIAGNOSIS — F07.81 POST CONCUSSIVE SYNDROME: Primary | ICD-10-CM

## 2022-03-30 DIAGNOSIS — Z71.6 TOBACCO ABUSE COUNSELING: ICD-10-CM

## 2022-03-30 DIAGNOSIS — H81.11 BENIGN PAROXYSMAL POSITIONAL VERTIGO OF RIGHT EAR: ICD-10-CM

## 2022-03-30 DIAGNOSIS — S09.90XA TRAUMATIC INJURY OF HEAD, INITIAL ENCOUNTER: ICD-10-CM

## 2022-03-30 PROCEDURE — 3044F PR MOST RECENT HEMOGLOBIN A1C LEVEL <7.0%: ICD-10-PCS | Mod: CPTII,S$GLB,, | Performed by: PHYSICIAN ASSISTANT

## 2022-03-30 PROCEDURE — 3008F BODY MASS INDEX DOCD: CPT | Mod: CPTII,S$GLB,, | Performed by: PHYSICIAN ASSISTANT

## 2022-03-30 PROCEDURE — 3044F HG A1C LEVEL LT 7.0%: CPT | Mod: CPTII,S$GLB,, | Performed by: PHYSICIAN ASSISTANT

## 2022-03-30 PROCEDURE — 99215 PR OFFICE/OUTPT VISIT, EST, LEVL V, 40-54 MIN: ICD-10-PCS | Mod: S$GLB,,, | Performed by: PHYSICIAN ASSISTANT

## 2022-03-30 PROCEDURE — 3077F PR MOST RECENT SYSTOLIC BLOOD PRESSURE >= 140 MM HG: ICD-10-PCS | Mod: CPTII,S$GLB,, | Performed by: PHYSICIAN ASSISTANT

## 2022-03-30 PROCEDURE — 3078F PR MOST RECENT DIASTOLIC BLOOD PRESSURE < 80 MM HG: ICD-10-PCS | Mod: CPTII,S$GLB,, | Performed by: PHYSICIAN ASSISTANT

## 2022-03-30 PROCEDURE — 3077F SYST BP >= 140 MM HG: CPT | Mod: CPTII,S$GLB,, | Performed by: PHYSICIAN ASSISTANT

## 2022-03-30 PROCEDURE — 3008F PR BODY MASS INDEX (BMI) DOCUMENTED: ICD-10-PCS | Mod: CPTII,S$GLB,, | Performed by: PHYSICIAN ASSISTANT

## 2022-03-30 PROCEDURE — 1159F MED LIST DOCD IN RCRD: CPT | Mod: CPTII,S$GLB,, | Performed by: PHYSICIAN ASSISTANT

## 2022-03-30 PROCEDURE — 99215 OFFICE O/P EST HI 40 MIN: CPT | Mod: S$GLB,,, | Performed by: PHYSICIAN ASSISTANT

## 2022-03-30 PROCEDURE — 1159F PR MEDICATION LIST DOCUMENTED IN MEDICAL RECORD: ICD-10-PCS | Mod: CPTII,S$GLB,, | Performed by: PHYSICIAN ASSISTANT

## 2022-03-30 PROCEDURE — 3078F DIAST BP <80 MM HG: CPT | Mod: CPTII,S$GLB,, | Performed by: PHYSICIAN ASSISTANT

## 2022-03-30 RX ORDER — MECLIZINE HYDROCHLORIDE 25 MG/1
25 TABLET ORAL 2 TIMES DAILY PRN
Qty: 20 TABLET | Refills: 0 | Status: SHIPPED | OUTPATIENT
Start: 2022-03-30 | End: 2023-01-27

## 2022-03-30 RX ORDER — ONDANSETRON 4 MG/1
8 TABLET, ORALLY DISINTEGRATING ORAL EVERY 12 HOURS PRN
Qty: 20 TABLET | Refills: 0 | Status: SHIPPED | OUTPATIENT
Start: 2022-03-30 | End: 2023-01-19

## 2022-03-30 NOTE — PATIENT INSTRUCTIONS
PLEASE READ YOUR DISCHARGE INSTRUCTIONS ENTIRELY AS IT CONTAINS IMPORTANT INFORMATION.    For your dizziness/nausea:  -take Zofran as needed for nausea.   -take meclizine as needed for dizziness.  Discussion this may close you to be drowsy.    - OTC Tylenol/anti-inflammatory as needed for pain (see below). These medications can be obtained over the counter at any local pharmacy without requiring a prescription.   OTC ORAL medications for pain reliever or fever reducing:  - Acetaminophen (tylenol) or Ibuprofen (advil,motrin) as directed as needed for fever/pain. Avoid tylenol if you have a history of liver disease or allergic reactions. Do not take ibuprofen if you have a history of allergic reactions, stomach bleeding ulcers, kidney disease, or if you take blood thinners.  -The patient was advised that NSAID-type medications have two very important potential side effects: gastrointestinal irritation including hemorrhage and renal injuries. patient was asked to take the medication with food and to stop if patient experiences any GI upset. I asked patient to call for vomiting, abdominal pain or black/bloody stools. The patient expresses understanding of these issues and all questions were answered.    OTC TOPICAL meds for pain reliever :  -You can apply OTC diclofenac or Volatren Gel 2-3 times daily to muscle or joints.  -You can also apply OTC lidocaine with menthol ointment 2-3 daily to muscle or joints.  -Please let one absorb before using the other. Do not use both at the same time as it may decrease efficacy. Please stop using if you develop any skin rash or irritation.  -Please wash your hands after application of these topical products.   - continue heat (muscle) /ice (bone/joint) compression, rice therapy, and muscle stretches.   - Radiographs and all diagnostic testing reviewed with patient  - If you smoke, please stop smoking.  - discussed weight loss given obesity    - if no improvement or worsening  symptoms, recommend follow-up with *NEUROLOGY AND THERAPY or PCP for further evaluation.  Please call the number below to set up appointment; if a referral has been placed.    -You must understand that you've received an Urgent Care treatment only and that you may be released before all your medical problems are known or treated. You, the patient, will arrange for follow up care as instructed. Please arrange follow up with your primary medical clinic within 2-5 days if your signs and symptoms have not resolved or worsen.   - Follow up with your PCP or specialty clinic as directed.  You can call [phone] or [phone] to schedule an appointment with the appropriate provider.    - If your condition worsens or fails to improve we recommend that you receive another evaluation at the emergency room immediately or contact your primary medical clinic to discuss your concerns in next 2-5 days.  Strict ER versus clinic precautions given.      RED FLAGS/WARNING SYMPTOMS DISCUSSED WITH PATIENT THAT WOULD WARRANT EMERGENT MEDICAL ATTENTION. Patient aware and verbalized understanding.
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
## 2022-03-31 ENCOUNTER — TELEPHONE (OUTPATIENT)
Dept: NEUROLOGY | Facility: CLINIC | Age: 37
End: 2022-03-31
Payer: COMMERCIAL

## 2022-03-31 NOTE — TELEPHONE ENCOUNTER
Patient called and assisted with scheduling concussion clinic. Patient is scheduled on 4/25 but hopes to be seen sooner.

## 2022-03-31 NOTE — TELEPHONE ENCOUNTER
----- Message from Marilee Ayala sent at 3/31/2022 12:12 PM CDT -----  Contact: self @ [phone]  Pt is calling to schedule an appt for F07.81 (ICD-10-CM) - Post concussive syndrome  S09.90XA (ICD-10-CM) - Traumatic injury of head, initial encounter  G44.319 (ICD-10-CM) - Acute post-traumatic headache, not intractable.  There is a referral in the system.  Pls call with an appt.

## 2022-04-25 ENCOUNTER — PATIENT MESSAGE (OUTPATIENT)
Dept: ORTHOPEDICS | Facility: CLINIC | Age: 37
End: 2022-04-25
Payer: COMMERCIAL
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
## 2022-04-27 ENCOUNTER — OFFICE VISIT (OUTPATIENT)
Dept: ORTHOPEDICS | Facility: CLINIC | Age: 37
End: 2022-04-27
Payer: COMMERCIAL

## 2022-04-27 ENCOUNTER — HOSPITAL ENCOUNTER (OUTPATIENT)
Dept: RADIOLOGY | Facility: HOSPITAL | Age: 37
Discharge: HOME | End: 2022-04-27
Attending: ORTHOPAEDIC SURGERY
Payer: COMMERCIAL

## 2022-04-27 VITALS — WEIGHT: 224.88 LBS | BODY MASS INDEX: 27.96 KG/M2 | HEIGHT: 75 IN

## 2022-04-27 DIAGNOSIS — M43.16 SPONDYLOLISTHESIS OF LUMBAR REGION: ICD-10-CM

## 2022-04-27 DIAGNOSIS — M51.36 DDD (DEGENERATIVE DISC DISEASE), LUMBAR: ICD-10-CM

## 2022-04-27 DIAGNOSIS — M54.16 LUMBAR RADICULOPATHY: Primary | ICD-10-CM

## 2022-04-27 PROCEDURE — 72110 X-RAY EXAM L-2 SPINE 4/>VWS: CPT | Mod: 26,,, | Performed by: RADIOLOGY

## 2022-04-27 PROCEDURE — 3044F HG A1C LEVEL LT 7.0%: CPT | Mod: CPTII,S$GLB,, | Performed by: REGISTERED NURSE

## 2022-04-27 PROCEDURE — 1159F MED LIST DOCD IN RCRD: CPT | Mod: CPTII,S$GLB,, | Performed by: REGISTERED NURSE

## 2022-04-27 PROCEDURE — 72110 XR LUMBAR SPINE AP AND LAT WITH FLEX/EXT: ICD-10-PCS | Mod: 26,,, | Performed by: RADIOLOGY

## 2022-04-27 PROCEDURE — 99999 PR PBB SHADOW E&M-EST. PATIENT-LVL III: ICD-10-PCS | Mod: PBBFAC,,, | Performed by: REGISTERED NURSE

## 2022-04-27 PROCEDURE — 3008F BODY MASS INDEX DOCD: CPT | Mod: CPTII,S$GLB,, | Performed by: REGISTERED NURSE

## 2022-04-27 PROCEDURE — 99999 PR PBB SHADOW E&M-EST. PATIENT-LVL III: CPT | Mod: PBBFAC,,, | Performed by: REGISTERED NURSE

## 2022-04-27 PROCEDURE — 1159F PR MEDICATION LIST DOCUMENTED IN MEDICAL RECORD: ICD-10-PCS | Mod: CPTII,S$GLB,, | Performed by: REGISTERED NURSE

## 2022-04-27 PROCEDURE — 72110 X-RAY EXAM L-2 SPINE 4/>VWS: CPT | Mod: TC

## 2022-04-27 PROCEDURE — 3008F PR BODY MASS INDEX (BMI) DOCUMENTED: ICD-10-PCS | Mod: CPTII,S$GLB,, | Performed by: REGISTERED NURSE

## 2022-04-27 PROCEDURE — 3044F PR MOST RECENT HEMOGLOBIN A1C LEVEL <7.0%: ICD-10-PCS | Mod: CPTII,S$GLB,, | Performed by: REGISTERED NURSE

## 2022-04-27 PROCEDURE — 99214 OFFICE O/P EST MOD 30 MIN: CPT | Mod: S$GLB,,, | Performed by: REGISTERED NURSE

## 2022-04-27 PROCEDURE — 99214 PR OFFICE/OUTPT VISIT, EST, LEVL IV, 30-39 MIN: ICD-10-PCS | Mod: S$GLB,,, | Performed by: REGISTERED NURSE

## 2022-04-27 RX ORDER — METHOCARBAMOL 750 MG/1
750 TABLET, FILM COATED ORAL 4 TIMES DAILY
Qty: 80 TABLET | Refills: 4 | Status: SHIPPED | OUTPATIENT
Start: 2022-04-27 | End: 2022-08-05

## 2022-04-27 RX ORDER — MELOXICAM 15 MG/1
15 TABLET ORAL DAILY
Qty: 60 TABLET | Refills: 4 | Status: SHIPPED | OUTPATIENT
Start: 2022-04-27 | End: 2022-12-05

## 2022-04-27 RX ORDER — HYDROCODONE BITARTRATE AND ACETAMINOPHEN 5; 325 MG/1; MG/1
1 TABLET ORAL EVERY 6 HOURS PRN
COMMUNITY
Start: 2022-03-19 | End: 2023-01-27

## 2022-05-04 NOTE — PROGRESS NOTES
Established Patient - Audio Only Telehealth Visit     The patient location is: home  The chief complaint leading to consultation is: MRi results  Visit type: Virtual visit with audio only (telephone)  Total time spent with patient: 10min     The reason for the audio only service rather than synchronous audio and video virtual visit was related to technical difficulties or patient preference/necessity.     Each patient to whom I provide medical services by telemedicine is:  (1) informed of the relationship between the physician and patient and the respective role of any other health care provider with respect to management of the patient; and (2) notified that they may decline to receive medical services by telemedicine and may withdraw from such care at any time. Patient verbally consented to receive this service via voice-only telephone call.    DATE: 5/16/2022  PATIENT: Lisa Garcia    Attending Physician: Raymon Little M.D.    HISTORY:  Lisa Garcia is a 36 y.o. male who returns to me today for MRI results.  He was last seen by me 4/27/2022.  Today he is doing well but notes continued bilateral leg pain and low back pain.    The Patient denies myelopathic symptoms such as handwriting changes or difficulty with buttons/coins/keys. Denies perineal paresthesias, bowel/bladder dysfunction.    PMH/PSH/FamHx/SocHx:  Unchanged from prior visit    ROS:  REVIEW OF SYSTEMS:  Constitution: Negative. Negative for chills, fever and night sweats.   HENT: Negative for congestion and headaches.    Eyes: Negative for blurred vision, left vision loss and right vision loss.   Cardiovascular: Negative for chest pain and syncope.   Respiratory: Negative for cough and shortness of breath.    Endocrine: Negative for polydipsia, polyphagia and polyuria.   Hematologic/Lymphatic: Negative for bleeding problem. Does not bruise/bleed easily.   Skin: Negative for dry skin, itching and rash.   Musculoskeletal: Negative for falls and  muscle weakness.   Gastrointestinal: Negative for abdominal pain and bowel incontinence.   Allergic/Immunologic: Negative for hives and persistent infections.  Genitourinary: Negative for urinary retention/incontinence and nocturia.   Neurological: negative for disturbances in coordination, no myelopathic symptoms such as handwriting changes or difficulty with buttons, coins, keys or small objects. No loss of balance and seizures.   Psychiatric/Behavioral: Negative for depression. The patient does not have insomnia.   Denies perineal paresthesias, bowel or bladder incontinence    EXAM:  There were no vitals taken for this visit.    Neuro and physical exam stable.    IMAGING:    Today I personally re- reviewed AP, Lat and Flex/Ex  upright L-spine that demonstrate lumbar spondylosis with disc space narrowing most severe L4/L5 and L5/S1       MRI lumbar shows moderate spinal canal stenosis at L4-5 and moderate neural foraminal narrowing L5-S1.      There is no height or weight on file to calculate BMI.    Hemoglobin A1C   Date Value Ref Range Status   02/18/2022 5.1 4.0 - 5.6 % Final     Comment:     ADA Screening Guidelines:  5.7-6.4%  Consistent with prediabetes  >or=6.5%  Consistent with diabetes    High levels of fetal hemoglobin interfere with the HbA1C  assay. Heterozygous hemoglobin variants (HbS, HgC, etc)do  not significantly interfere with this assay.   However, presence of multiple variants may affect accuracy.     02/05/2021 5.2 4.0 - 5.6 % Final     Comment:     ADA Screening Guidelines:  5.7-6.4%  Consistent with prediabetes  >or=6.5%  Consistent with diabetes    High levels of fetal hemoglobin interfere with the HbA1C  assay. Heterozygous hemoglobin variants (HbS, HgC, etc)do  not significantly interfere with this assay.   However, presence of multiple variants may affect accuracy.           ASSESSMENT/PLAN:    Diagnoses and all orders for this visit:    Lumbar radiculopathy    DDD (degenerative disc  disease), lumbar        Today we discussed at length all of the different treatment options including anti-inflammatories, acetaminophen, rest, ice, heat, physical therapy including strengthening and stretching exercises, home exercises, ROM, aerobic conditioning, aqua therapy, other modalities including ultrasound, massage, and dry needling, epidural steroid injections and finally surgical intervention.      Bilateral TF MITCH ordered with pain management at [facility]. He will follow up 2 weeks after his injection if his pain persists. Gabapentin sent to pharmacy.      This service was not originating from a related E/M service provided within the previous 7 days nor will  to an E/M service or procedure within the next 24 hours or my soonest available appointment.  Prevailing standard of care was able to be met in this audio-only visit.

## 2022-05-12 ENCOUNTER — PATIENT MESSAGE (OUTPATIENT)
Dept: ORTHOPEDICS | Facility: CLINIC | Age: 37
End: 2022-05-12
Payer: COMMERCIAL

## 2022-05-13 ENCOUNTER — HOSPITAL ENCOUNTER (OUTPATIENT)
Dept: RADIOLOGY | Facility: HOSPITAL | Age: 37
Discharge: HOME | End: 2022-05-13
Attending: REGISTERED NURSE
Payer: COMMERCIAL

## 2022-05-13 DIAGNOSIS — M54.16 LUMBAR RADICULOPATHY: ICD-10-CM

## 2022-05-13 PROCEDURE — 72148 MRI LUMBAR SPINE W/O DYE: CPT | Mod: TC

## 2022-05-13 PROCEDURE — 72148 MRI LUMBAR SPINE WITHOUT CONTRAST: ICD-10-PCS | Mod: 26,,, | Performed by: RADIOLOGY

## 2022-05-13 PROCEDURE — 72148 MRI LUMBAR SPINE W/O DYE: CPT | Mod: 26,,, | Performed by: RADIOLOGY

## 2022-05-16 ENCOUNTER — OFFICE VISIT (OUTPATIENT)
Dept: ORTHOPEDICS | Facility: CLINIC | Age: 37
End: 2022-05-16
Payer: COMMERCIAL

## 2022-05-16 DIAGNOSIS — M51.36 DDD (DEGENERATIVE DISC DISEASE), LUMBAR: ICD-10-CM

## 2022-05-16 DIAGNOSIS — M54.16 LUMBAR RADICULOPATHY: Primary | ICD-10-CM

## 2022-05-16 PROCEDURE — 1159F MED LIST DOCD IN RCRD: CPT | Mod: CPTII,95,, | Performed by: REGISTERED NURSE

## 2022-05-16 PROCEDURE — 3044F HG A1C LEVEL LT 7.0%: CPT | Mod: CPTII,95,, | Performed by: REGISTERED NURSE

## 2022-05-16 PROCEDURE — 99213 PR OFFICE/OUTPT VISIT, EST, LEVL III, 20-29 MIN: ICD-10-PCS | Mod: 95,,, | Performed by: REGISTERED NURSE

## 2022-05-16 PROCEDURE — 3044F PR MOST RECENT HEMOGLOBIN A1C LEVEL <7.0%: ICD-10-PCS | Mod: CPTII,95,, | Performed by: REGISTERED NURSE

## 2022-05-16 PROCEDURE — 1160F PR REVIEW ALL MEDS BY PRESCRIBER/CLIN PHARMACIST DOCUMENTED: ICD-10-PCS | Mod: CPTII,95,, | Performed by: REGISTERED NURSE

## 2022-05-16 PROCEDURE — 1160F RVW MEDS BY RX/DR IN RCRD: CPT | Mod: CPTII,95,, | Performed by: REGISTERED NURSE

## 2022-05-16 PROCEDURE — 1159F PR MEDICATION LIST DOCUMENTED IN MEDICAL RECORD: ICD-10-PCS | Mod: CPTII,95,, | Performed by: REGISTERED NURSE

## 2022-05-16 PROCEDURE — 99213 OFFICE O/P EST LOW 20 MIN: CPT | Mod: 95,,, | Performed by: REGISTERED NURSE

## 2022-05-16 RX ORDER — GABAPENTIN 300 MG/1
300 CAPSULE ORAL NIGHTLY
Qty: 90 CAPSULE | Refills: 0 | Status: SHIPPED | OUTPATIENT
Start: 2022-05-16 | End: 2023-01-27

## 2022-05-18 ENCOUNTER — PATIENT MESSAGE (OUTPATIENT)
Dept: ORTHOPEDICS | Facility: CLINIC | Age: 37
End: 2022-05-18
Payer: COMMERCIAL

## 2022-05-25 ENCOUNTER — HOSPITAL ENCOUNTER (OUTPATIENT)
Facility: OTHER | Age: 37
Discharge: HOME | End: 2022-05-25
Attending: ANESTHESIOLOGY | Admitting: ANESTHESIOLOGY
Payer: COMMERCIAL

## 2022-05-25 VITALS
HEIGHT: 74 IN | OXYGEN SATURATION: 98 % | TEMPERATURE: 98 F | BODY MASS INDEX: 28.23 KG/M2 | SYSTOLIC BLOOD PRESSURE: 117 MMHG | WEIGHT: 220 LBS | RESPIRATION RATE: 14 BRPM | HEART RATE: 56 BPM | DIASTOLIC BLOOD PRESSURE: 72 MMHG

## 2022-05-25 DIAGNOSIS — M51.36 DDD (DEGENERATIVE DISC DISEASE), LUMBAR: Primary | ICD-10-CM

## 2022-05-25 DIAGNOSIS — M54.17 LUMBOSACRAL RADICULOPATHY: ICD-10-CM

## 2022-05-25 PROCEDURE — 64483 NJX AA&/STRD TFRM EPI L/S 1: CPT | Mod: 50 | Performed by: ANESTHESIOLOGY

## 2022-05-25 PROCEDURE — 25000003 PHARM REV CODE 250: Performed by: STUDENT IN AN ORGANIZED HEALTH CARE EDUCATION/TRAINING PROGRAM

## 2022-05-25 PROCEDURE — 25000003 PHARM REV CODE 250: Performed by: ANESTHESIOLOGY

## 2022-05-25 PROCEDURE — 25500020 PHARM REV CODE 255: Performed by: ANESTHESIOLOGY

## 2022-05-25 PROCEDURE — 64483 NJX AA&/STRD TFRM EPI L/S 1: CPT | Mod: 50,,, | Performed by: ANESTHESIOLOGY

## 2022-05-25 PROCEDURE — 64483 PR EPIDURAL INJ, ANES/STEROID, TRANSFORAMINAL, LUMB/SACR, SNGL LEVL: ICD-10-PCS | Mod: 50,,, | Performed by: ANESTHESIOLOGY

## 2022-05-25 PROCEDURE — 63600175 PHARM REV CODE 636 W HCPCS: Performed by: ANESTHESIOLOGY

## 2022-05-25 RX ORDER — SODIUM CHLORIDE 9 MG/ML
500 INJECTION, SOLUTION INTRAVENOUS CONTINUOUS
Status: ACTIVE | OUTPATIENT
Start: 2022-05-25 | End: 2022-05-26

## 2022-05-25 RX ORDER — MIDAZOLAM HYDROCHLORIDE 1 MG/ML
INJECTION INTRAMUSCULAR; INTRAVENOUS
Status: DISCONTINUED | OUTPATIENT
Start: 2022-05-25 | End: 2022-05-25 | Stop reason: HOSPADM

## 2022-05-25 RX ORDER — ONDANSETRON 2 MG/ML
4 INJECTION INTRAMUSCULAR; INTRAVENOUS ONCE
Status: COMPLETED | OUTPATIENT
Start: 2022-05-25 | End: 2022-05-25

## 2022-05-25 RX ORDER — DEXAMETHASONE SODIUM PHOSPHATE 10 MG/ML
INJECTION INTRAMUSCULAR; INTRAVENOUS
Status: DISCONTINUED | OUTPATIENT
Start: 2022-05-25 | End: 2022-05-25 | Stop reason: HOSPADM

## 2022-05-25 RX ORDER — LIDOCAINE HYDROCHLORIDE 20 MG/ML
INJECTION, SOLUTION INFILTRATION; PERINEURAL
Status: DISCONTINUED | OUTPATIENT
Start: 2022-05-25 | End: 2022-05-25 | Stop reason: HOSPADM

## 2022-05-25 RX ORDER — LIDOCAINE HYDROCHLORIDE 10 MG/ML
INJECTION, SOLUTION EPIDURAL; INFILTRATION; INTRACAUDAL; PERINEURAL
Status: DISCONTINUED | OUTPATIENT
Start: 2022-05-25 | End: 2022-05-25 | Stop reason: HOSPADM

## 2022-05-25 RX ORDER — FENTANYL CITRATE 50 UG/ML
INJECTION, SOLUTION INTRAMUSCULAR; INTRAVENOUS
Status: DISCONTINUED | OUTPATIENT
Start: 2022-05-25 | End: 2022-05-25 | Stop reason: HOSPADM

## 2022-05-25 RX ADMIN — ONDANSETRON 4 MG: 2 INJECTION INTRAMUSCULAR; INTRAVENOUS at 03:05

## 2022-05-25 NOTE — DISCHARGE INSTRUCTIONS

## 2022-05-25 NOTE — DISCHARGE SUMMARY
Discharge Note  Short Stay      SUMMARY     Admit Date: 5/25/2022    Attending Physician: Kris Negron      Discharge Physician: Kris Negron      Discharge Date: 5/25/2022 3:14 PM    Procedure(s) (LRB):  INJECTION, STEROID, EPIDURAL, TF BILATERAL L4-L5 CONTRAST DIRECT REF (Bilateral)    Final Diagnosis: Lumbar radiculopathy [M54.16]    Disposition: Home or self care    Patient Instructions:   Current Discharge Medication List      CONTINUE these medications which have NOT CHANGED    Details   clindamycin (CLEOCIN T) 1 % external solution APPLY TO AFFECTED AREA TOPICALLY TWICE A DAY  Qty: 60 mL, Refills: 2    Associated Diagnoses: Alopecia of scalp; Acne vulgaris      gabapentin (NEURONTIN) 300 MG capsule Take 1 capsule (300 mg total) by mouth every evening.  Qty: 90 capsule, Refills: 0    Associated Diagnoses: Lumbar radiculopathy      HYDROcodone-acetaminophen (NORCO) 5-325 mg per tablet Take 1 tablet by mouth every 6 (six) hours as needed.      meclizine (ANTIVERT) 25 mg tablet Take 1 tablet (25 mg total) by mouth 2 (two) times daily as needed for Dizziness.  Qty: 20 tablet, Refills: 0    Associated Diagnoses: Post concussive syndrome; Benign paroxysmal positional vertigo of right ear      meloxicam (MOBIC) 15 MG tablet Take 1 tablet (15 mg total) by mouth once daily.  Qty: 60 tablet, Refills: 4    Associated Diagnoses: Lumbar radiculopathy; DDD (degenerative disc disease), lumbar      methocarbamoL (ROBAXIN) 750 MG Tab Take 1 tablet (750 mg total) by mouth 4 (four) times daily.  Qty: 80 tablet, Refills: 4    Associated Diagnoses: Lumbar radiculopathy; Spondylolisthesis of lumbar region      multivitamin (THERAGRAN) per tablet Take 1 tablet by mouth once daily.      ondansetron (ZOFRAN-ODT) 4 MG TbDL Take 2 tablets (8 mg total) by mouth every 12 (twelve) hours as needed (NAUSEA).  Qty: 20 tablet, Refills: 0    Associated Diagnoses: Post concussive syndrome; Benign paroxysmal positional vertigo of right  ear                 Discharge Diagnosis: Lumbar radiculopathy [M54.16]  Condition on Discharge: Stable with no complications to procedure   Diet on Discharge: Same as before.  Activity: as per instruction sheet.  Discharge to: Home with a responsible adult.  Follow up: 2-4 weeks       Please call my office or pager at [phone] if experienced any weakness or loss of sensation, fever > 101.5, pain uncontrolled with oral medications, persistent nausea/vomiting/or diarrhea, redness or drainage from the incisions, or any other worrisome concerns. If physician on call was not reached or could not communicate with our office for any reason please go to the nearest emergency department

## 2022-05-25 NOTE — H&P
HPI  Patient presenting for Procedure(s) (LRB):  INJECTION, STEROID, EPIDURAL, TF BILATERAL L4-L5 CONTRAST DIRECT REF (Bilateral)     Patient on Anti-coagulation No    No health changes since previous encounter    Past Medical History:   Diagnosis Date    Alopecia of scalp 2009    DDD (degenerative disc disease), lumbar     L4/5    Ganglion cyst     Left hand, b/t 1st/2nd MCP.    Greater trochanteric bursitis, right     Patellofemoral pain syndrome of left knee     Tendinosis of rotator cuff     Right     Past Surgical History:   Procedure Laterality Date    APPENDECTOMY       Review of patient's allergies indicates:  No Known Allergies   No current facility-administered medications for this encounter.       PMHx, PSHx, Allergies, Medications reviewed in epic    ROS negative except pain complaints in HPI    OBJECTIVE:    There were no vitals taken for this visit.    PHYSICAL EXAMINATION:    GENERAL: Well appearing, in no acute distress, alert and oriented x3.  PSYCH:  Mood and affect appropriate.  SKIN: Skin color, texture, turgor normal, no rashes or lesions which will impact the procedure.  CV: RRR with palpation of the radial artery.  PULM: No evidence of respiratory difficulty, symmetric chest rise. Clear to auscultation.  NEURO: Cranial nerves grossly intact.    Plan:    Proceed with procedure as planned Procedure(s) (LRB):  INJECTION, STEROID, EPIDURAL, TF BILATERAL L4-L5 CONTRAST DIRECT REF (Bilateral)    Kris Negron  05/25/2022

## 2022-05-25 NOTE — OP NOTE
Lumbar Transforaminal Epidural Steroid Injection under Fluoroscopic Guidance    The procedure, risks, benefits, and options were discussed with the patient. There are no contraindications to the procedure. The patent expressed understanding and agreed to the procedure. Informed written consent was obtained prior to the start of the procedure and can be found in the patient's chart.    PATIENT NAME: Lisa Garcia   MRN: [id number]     DATE OF PROCEDURE: 05/25/2022    PROCEDURE:  Bilateral  L4/5 Lumbar Transforaminal Epidural Steroid Injection under Fluoroscopic Guidance    PRE-OP DIAGNOSIS: Lumbar radiculopathy [M54.16] Lumbar radiculopathy [M54.16]  DDD    POST-OP DIAGNOSIS: Same    PHYSICIAN: Zurdo Henry MD    ASSISTANTS: Farooq Negron, PGY-5 [facility]     MEDICATIONS INJECTED: Preservative-free Decadron 10mg with 5cc of Lidocaine 1% MPF     LOCAL ANESTHETIC INJECTED: Xylocaine 2%     SEDATION:   Versed 2mg and Fentanyl 100mcg                                                                                                                                                                                     Conscious sedation ordered by M.D. Patient re-evaluation prior to administration of conscious sedation. No changes noted in patient's status from initial evaluation. The patient's vital signs were monitored by RN and patient remained hemodynamically stable throughout the procedure.    Event Time In   Sedation Start 1518   Sedation End 1526       ESTIMATED BLOOD LOSS: None    COMPLICATIONS: None    TECHNIQUE: Time-out was performed to identify the patient and procedure to be performed. With the patient laying in a prone position, the surgical area was prepped and draped in the usual sterile fashion using ChloraPrep and a fenestrated drape.The levels were determined under fluoroscopy guidance. Skin anesthesia was achieved by injecting Lidocaine 2% over the injection sites. The transforaminal spaces were  then approached with a 22 gauge, 5 inch spinal quinke needle that was introduced under fluoroscopic guidance in the AP and Lateral views. Once the needle tip was in the area of the transforaminal space, and there was no blood, CSF or paraesthesias, contrast dye Omnipaque (240mg/mL) was injected to confirm placement. Vascular uptake noted at L4/5 on the right in an arterial distribution (likely artery of Adamkiewicz). Needle readjusted and contrast noted into in the neural foramen. Fluoroscopic imaging in the AP and lateral views revealed a clear outline of the spinal nerve with proximal spread of agent through the neural foramen into the epidural space. 3 mL of the medication mixture listed above was injected slowly at each site. Displacement of the radio opaque contrast after injection of the medication confirmed that the medication went into the area of the transforaminal spaces. The needles were removed and bleeding was nil. A sterile dressing was applied. No specimens collected. The patient tolerated the procedure well.       The patient was monitored after the procedure in the recovery area. They were given post-procedure and discharge instructions to follow at home. The patient was discharged in a stable condition.    I reviewed and edited the fellow's note. I conducted my own interview and physical examination. I agree with the findings. I was present and supervising all critical portions of the procedure.    Zurdo Henry MD

## 2022-06-05 ENCOUNTER — PATIENT MESSAGE (OUTPATIENT)
Dept: ORTHOPEDICS | Facility: CLINIC | Age: 37
End: 2022-06-05
Payer: COMMERCIAL

## 2022-06-06 NOTE — PROGRESS NOTES
Established Patient - Audio Only Telehealth Visit     The patient location is: home  The chief complaint leading to consultation is: MITCH FU  Visit type: Virtual visit with audio only (telephone)  Total time spent with patient: 10min     The reason for the audio only service rather than synchronous audio and video virtual visit was related to technical difficulties or patient preference/necessity.     Each patient to whom I provide medical services by telemedicine is:  (1) informed of the relationship between the physician and patient and the respective role of any other health care provider with respect to management of the patient; and (2) notified that they may decline to receive medical services by telemedicine and may withdraw from such care at any time. Patient verbally consented to receive this service via voice-only telephone call.    DATE: 6/8/2022  PATIENT: Lisa Garcia    Attending Physician: Raymon Little M.D.    HISTORY:  Lisa Garcia is a 36 y.o. male who returns to me today for MITCH FU.  He was last seen by me 5/16/2022.  Today he is doing well but notes continued right leg pain and left leg numbness and heaviness. The patient had a L4/5 TF MITCH on 5/25/22 which gave 70% relief for 2 days. His pain now is 7/10.     The Patient denies myelopathic symptoms such as handwriting changes or difficulty with buttons/coins/keys. Denies perineal paresthesias, bowel/bladder dysfunction.    PMH/PSH/FamHx/SocHx:  Unchanged from prior visit    ROS:  REVIEW OF SYSTEMS:  Constitution: Negative. Negative for chills, fever and night sweats.   HENT: Negative for congestion and headaches.    Eyes: Negative for blurred vision, left vision loss and right vision loss.   Cardiovascular: Negative for chest pain and syncope.   Respiratory: Negative for cough and shortness of breath.    Endocrine: Negative for polydipsia, polyphagia and polyuria.   Hematologic/Lymphatic: Negative for bleeding problem. Does not bruise/bleed  easily.   Skin: Negative for dry skin, itching and rash.   Musculoskeletal: Negative for falls and muscle weakness.   Gastrointestinal: Negative for abdominal pain and bowel incontinence.   Allergic/Immunologic: Negative for hives and persistent infections.  Genitourinary: Negative for urinary retention/incontinence and nocturia.   Neurological: negative for disturbances in coordination, no myelopathic symptoms such as handwriting changes or difficulty with buttons, coins, keys or small objects. No loss of balance and seizures.   Psychiatric/Behavioral: Negative for depression. The patient does not have insomnia.   Denies perineal paresthesias, bowel or bladder incontinence    EXAM:  There were no vitals taken for this visit.    neuro and physical exam stable.     IMAGING:    Today I personally re- reviewed AP, Lat and Flex/Ex  upright L-spine that demonstrate lumbar spondylosis with disc space narrowing most severe L4/L5 and L5/S1       MRI lumbar shows moderate spinal canal stenosis at L4-5 and moderate neural foraminal narrowing L5-S1.      There is no height or weight on file to calculate BMI.    Hemoglobin A1C   Date Value Ref Range Status   02/18/2022 5.1 4.0 - 5.6 % Final     Comment:     ADA Screening Guidelines:  5.7-6.4%  Consistent with prediabetes  >or=6.5%  Consistent with diabetes    High levels of fetal hemoglobin interfere with the HbA1C  assay. Heterozygous hemoglobin variants (HbS, HgC, etc)do  not significantly interfere with this assay.   However, presence of multiple variants may affect accuracy.     02/05/2021 5.2 4.0 - 5.6 % Final     Comment:     ADA Screening Guidelines:  5.7-6.4%  Consistent with prediabetes  >or=6.5%  Consistent with diabetes    High levels of fetal hemoglobin interfere with the HbA1C  assay. Heterozygous hemoglobin variants (HbS, HgC, etc)do  not significantly interfere with this assay.   However, presence of multiple variants may affect accuracy.            ASSESSMENT/PLAN:    Diagnoses and all orders for this visit:    Lumbar radiculopathy  -     Procedure Order to Pain Management; Future        Today we discussed at length all of the different treatment options including anti-inflammatories, acetaminophen, rest, ice, heat, physical therapy including strengthening and stretching exercises, home exercises, ROM, aerobic conditioning, aqua therapy, other modalities including ultrasound, massage, and dry needling, epidural steroid injections and finally surgical intervention.      L4/5 epidural injection ordered with pain management. He will follow up 2 weeks after the injection if his pain persists. I will likely schedule the patient with Dr. Stern at that time.      This service was not originating from a related E/M service provided within the previous 7 days nor will  to an E/M service or procedure within the next 24 hours or my soonest available appointment.  Prevailing standard of care was able to be met in this audio-only visit.

## 2022-06-08 ENCOUNTER — OFFICE VISIT (OUTPATIENT)
Dept: ORTHOPEDICS | Facility: CLINIC | Age: 37
End: 2022-06-08
Payer: COMMERCIAL

## 2022-06-08 DIAGNOSIS — M54.16 LUMBAR RADICULOPATHY: Primary | ICD-10-CM

## 2022-06-08 PROCEDURE — 99213 OFFICE O/P EST LOW 20 MIN: CPT | Mod: 95,,, | Performed by: REGISTERED NURSE

## 2022-06-08 PROCEDURE — 1160F RVW MEDS BY RX/DR IN RCRD: CPT | Mod: CPTII,95,, | Performed by: REGISTERED NURSE

## 2022-06-08 PROCEDURE — 3044F HG A1C LEVEL LT 7.0%: CPT | Mod: CPTII,95,, | Performed by: REGISTERED NURSE

## 2022-06-08 PROCEDURE — 3044F PR MOST RECENT HEMOGLOBIN A1C LEVEL <7.0%: ICD-10-PCS | Mod: CPTII,95,, | Performed by: REGISTERED NURSE

## 2022-06-08 PROCEDURE — 1159F PR MEDICATION LIST DOCUMENTED IN MEDICAL RECORD: ICD-10-PCS | Mod: CPTII,95,, | Performed by: REGISTERED NURSE

## 2022-06-08 PROCEDURE — 99213 PR OFFICE/OUTPT VISIT, EST, LEVL III, 20-29 MIN: ICD-10-PCS | Mod: 95,,, | Performed by: REGISTERED NURSE

## 2022-06-08 PROCEDURE — 1160F PR REVIEW ALL MEDS BY PRESCRIBER/CLIN PHARMACIST DOCUMENTED: ICD-10-PCS | Mod: CPTII,95,, | Performed by: REGISTERED NURSE

## 2022-06-08 PROCEDURE — 1159F MED LIST DOCD IN RCRD: CPT | Mod: CPTII,95,, | Performed by: REGISTERED NURSE

## 2022-06-09 ENCOUNTER — TELEPHONE (OUTPATIENT)
Dept: ADMINISTRATIVE | Facility: OTHER | Age: 37
End: 2022-06-09
Payer: COMMERCIAL

## 2022-06-10 ENCOUNTER — PATIENT MESSAGE (OUTPATIENT)
Dept: PAIN MEDICINE | Facility: OTHER | Age: 37
End: 2022-06-10
Payer: COMMERCIAL

## 2022-06-10 ENCOUNTER — TELEPHONE (OUTPATIENT)
Dept: ADMINISTRATIVE | Facility: OTHER | Age: 37
End: 2022-06-10
Payer: COMMERCIAL

## 2022-06-13 ENCOUNTER — OFFICE VISIT (OUTPATIENT)
Dept: NEUROSURGERY | Facility: CLINIC | Age: 37
End: 2022-06-13
Payer: COMMERCIAL

## 2022-06-13 VITALS — DIASTOLIC BLOOD PRESSURE: 70 MMHG | SYSTOLIC BLOOD PRESSURE: 117 MMHG | HEART RATE: 60 BPM

## 2022-06-13 DIAGNOSIS — M54.17 LUMBOSACRAL RADICULOPATHY AT S1: Primary | ICD-10-CM

## 2022-06-13 PROCEDURE — 99203 OFFICE O/P NEW LOW 30 MIN: CPT | Mod: S$GLB,,, | Performed by: STUDENT IN AN ORGANIZED HEALTH CARE EDUCATION/TRAINING PROGRAM

## 2022-06-13 PROCEDURE — 1159F PR MEDICATION LIST DOCUMENTED IN MEDICAL RECORD: ICD-10-PCS | Mod: CPTII,S$GLB,, | Performed by: STUDENT IN AN ORGANIZED HEALTH CARE EDUCATION/TRAINING PROGRAM

## 2022-06-13 PROCEDURE — 3074F PR MOST RECENT SYSTOLIC BLOOD PRESSURE < 130 MM HG: ICD-10-PCS | Mod: CPTII,S$GLB,, | Performed by: STUDENT IN AN ORGANIZED HEALTH CARE EDUCATION/TRAINING PROGRAM

## 2022-06-13 PROCEDURE — 3078F DIAST BP <80 MM HG: CPT | Mod: CPTII,S$GLB,, | Performed by: STUDENT IN AN ORGANIZED HEALTH CARE EDUCATION/TRAINING PROGRAM

## 2022-06-13 PROCEDURE — 99203 PR OFFICE/OUTPT VISIT, NEW, LEVL III, 30-44 MIN: ICD-10-PCS | Mod: S$GLB,,, | Performed by: STUDENT IN AN ORGANIZED HEALTH CARE EDUCATION/TRAINING PROGRAM

## 2022-06-13 PROCEDURE — 3044F PR MOST RECENT HEMOGLOBIN A1C LEVEL <7.0%: ICD-10-PCS | Mod: CPTII,S$GLB,, | Performed by: STUDENT IN AN ORGANIZED HEALTH CARE EDUCATION/TRAINING PROGRAM

## 2022-06-13 PROCEDURE — 1159F MED LIST DOCD IN RCRD: CPT | Mod: CPTII,S$GLB,, | Performed by: STUDENT IN AN ORGANIZED HEALTH CARE EDUCATION/TRAINING PROGRAM

## 2022-06-13 PROCEDURE — 99999 PR PBB SHADOW E&M-EST. PATIENT-LVL III: CPT | Mod: PBBFAC,,, | Performed by: STUDENT IN AN ORGANIZED HEALTH CARE EDUCATION/TRAINING PROGRAM

## 2022-06-13 PROCEDURE — 3074F SYST BP LT 130 MM HG: CPT | Mod: CPTII,S$GLB,, | Performed by: STUDENT IN AN ORGANIZED HEALTH CARE EDUCATION/TRAINING PROGRAM

## 2022-06-13 PROCEDURE — 3078F PR MOST RECENT DIASTOLIC BLOOD PRESSURE < 80 MM HG: ICD-10-PCS | Mod: CPTII,S$GLB,, | Performed by: STUDENT IN AN ORGANIZED HEALTH CARE EDUCATION/TRAINING PROGRAM

## 2022-06-13 PROCEDURE — 99999 PR PBB SHADOW E&M-EST. PATIENT-LVL III: ICD-10-PCS | Mod: PBBFAC,,, | Performed by: STUDENT IN AN ORGANIZED HEALTH CARE EDUCATION/TRAINING PROGRAM

## 2022-06-13 PROCEDURE — 3044F HG A1C LEVEL LT 7.0%: CPT | Mod: CPTII,S$GLB,, | Performed by: STUDENT IN AN ORGANIZED HEALTH CARE EDUCATION/TRAINING PROGRAM

## 2022-06-13 NOTE — PROGRESS NOTES
Neurosurgery  History & Physical    SUBJECTIVE:     Chief Complaint: LLE numbness    History of Present Illness:  36 M presents for eval of chronic low back pain and more recent LLE numbness.  His left leg symptoms are worse than his back pain.  Back pain is located at lower lumbar area.  He endorses LLE numbness that radiates into sole of his left foot when he is standing and walking.  This has worsened over the past month.  He has no RLE symptoms.  He had bilateral L4/5 TFESIs in may of 2022 which gave him no relief of his LLE numbness.  He has been to PT for his low back before.  He is scheduled for another injection with pain mgmt this Wednesday.  He has no bowel/bladder incontinence or saddle anesthesia.    Review of patient's allergies indicates:  No Known Allergies    Current Outpatient Medications   Medication Sig Dispense Refill    clindamycin (CLEOCIN T) 1 % external solution APPLY TO AFFECTED AREA TOPICALLY TWICE A DAY 60 mL 2    gabapentin (NEURONTIN) 300 MG capsule Take 1 capsule (300 mg total) by mouth every evening. 90 capsule 0    HYDROcodone-acetaminophen (NORCO) 5-325 mg per tablet Take 1 tablet by mouth every 6 (six) hours as needed.      meclizine (ANTIVERT) 25 mg tablet Take 1 tablet (25 mg total) by mouth 2 (two) times daily as needed for Dizziness. 20 tablet 0    meloxicam (MOBIC) 15 MG tablet Take 1 tablet (15 mg total) by mouth once daily. 60 tablet 4    methocarbamoL (ROBAXIN) 750 MG Tab Take 1 tablet (750 mg total) by mouth 4 (four) times daily. 80 tablet 4    multivitamin (THERAGRAN) per tablet Take 1 tablet by mouth once daily.      ondansetron (ZOFRAN-ODT) 4 MG TbDL Take 2 tablets (8 mg total) by mouth every 12 (twelve) hours as needed (NAUSEA). 20 tablet 0     No current facility-administered medications for this visit.       Past Medical History:   Diagnosis Date    Alopecia of scalp 2009    DDD (degenerative disc disease), lumbar     L4/5    Ganglion cyst     Left hand,  b/t 1st/2nd MCP.    Greater trochanteric bursitis, right     Patellofemoral pain syndrome of left knee     Tendinosis of rotator cuff     Right     Past Surgical History:   Procedure Laterality Date    APPENDECTOMY      TRANSFORAMINAL EPIDURAL INJECTION OF STEROID Bilateral 5/25/2022    Procedure: INJECTION, STEROID, EPIDURAL, TF BILATERAL L4-L5 CONTRAST DIRECT REF;  Surgeon: Zurdo Henry MD;  Location: [facility];  Service: Pain Management;  Laterality: Bilateral;     Family History     Problem Relation (Age of Onset)    Hypothyroidism Mother    Lung cancer Paternal Grandfather    Osteoporosis Father        Social History     Socioeconomic History    Marital status:     Number of children: 0   Occupational History    Occupation:    Tobacco Use    Smoking status: Current Some Day Smoker     Types: Cigars    Smokeless tobacco: Never Used    Tobacco comment: once every few months   Substance and Sexual Activity    Alcohol use: Yes     Alcohol/week: 5.0 standard drinks     Types: 5 Standard drinks or equivalent per week     Comment: Social, scotch, bourbon    Drug use: Yes     Types: Marijuana     Comment: rare    Sexual activity: Yes     Partners: Female       Review of Systems   14 point ROS was negative    OBJECTIVE:     Vital Signs  Pulse: 60  BP: 117/70  Pain Score: 0-No pain  There is no height or weight on file to calculate BMI.      Physical Exam:    Constitutional: He appears well-developed and well-nourished.     Eyes: Pupils are equal, round, and reactive to light.     Cardiovascular: Normal rate and regular rhythm.     Abdominal: Soft.     Psych/Behavior: He is alert. He is oriented to person, place, and time. He has a normal mood and affect.     Musculoskeletal: Gait is normal.        Neck: Range of motion is full.        Back: Range of motion is limited.        Right Upper Extremities: Muscle strength is 5/5.        Left Upper Extremities: Muscle strength is 5/5.        Right Lower Extremities: Muscle strength is 5/5.        Left Lower Extremities: Muscle strength is 5/5.     Neurological:        Coordination: He has a normal Romberg Test and normal tandem walking coordination.        Sensory: There is no sensory deficit in the trunk. There is no sensory deficit in the extremities.        DTRs: DTRs are DTRS NORMAL AND SYMMETRICnormal and symmetric.        Cranial nerves: Cranial nerve(s) II, III, IV, V, VI, VII, VIII, IX, X, XI and XII are intact.     No curry's    Negative facet loading in lumbar spine bilaterally    No TTP throughout lumbar spine    Able to walk on heels/toes.    Diagnostic Results:  MRI L spine: severe disc space loss at L4/5 with modic changes.  Severe left lateral recess stenosis at L4/5 secondary to disc bulge and ligamentum flavum hypertrophy.  L5/S1 left paracentral disc bulge resulting in moderate to severe left lateral recess and NF stenosis.  Flex/ex L spine: no instability.    ASSESSMENT/PLAN:     36 M with LLE parasthesias/numbness predominantly in an S1 distribution.  His MRI L spine shows severe left lateral recess stenosis at L4/5, 5/S1.  He is intact.  He is to undergo another injection this Wednesday.  Will plan to get EMG of BLE to help delineate which lumbar level is the source of his leg pain.  -EMG BLE  -Fu in 8 weeks.

## 2022-06-15 ENCOUNTER — TELEPHONE (OUTPATIENT)
Dept: PAIN MEDICINE | Facility: CLINIC | Age: 37
End: 2022-06-15
Payer: COMMERCIAL

## 2022-06-15 NOTE — TELEPHONE ENCOUNTER
----- Message from Lisa Javier sent at 6/15/2022  1:19 PM CDT -----  Regarding: covid exposure  Name of Who is Calling: Lisa           What is the request in detail: Patient is requesting a call back in regards to him being expose to covid.           Can the clinic reply by [facility]: No           What Number to Call Back if not in [facility]: [phone]

## 2022-06-17 ENCOUNTER — PATIENT MESSAGE (OUTPATIENT)
Dept: NEUROSURGERY | Facility: CLINIC | Age: 37
End: 2022-06-17
Payer: COMMERCIAL

## 2022-06-19 ENCOUNTER — PATIENT MESSAGE (OUTPATIENT)
Dept: INTERNAL MEDICINE | Facility: CLINIC | Age: 37
End: 2022-06-19
Payer: COMMERCIAL

## 2022-06-20 ENCOUNTER — TELEPHONE (OUTPATIENT)
Dept: INTERNAL MEDICINE | Facility: CLINIC | Age: 37
End: 2022-06-20
Payer: COMMERCIAL

## 2022-06-20 DIAGNOSIS — U07.1 COVID-19: Primary | ICD-10-CM

## 2022-07-13 ENCOUNTER — PROCEDURE VISIT (OUTPATIENT)
Dept: NEUROLOGY | Facility: CLINIC | Age: 37
End: 2022-07-13
Payer: COMMERCIAL

## 2022-07-13 DIAGNOSIS — M54.17 LUMBOSACRAL RADICULOPATHY AT S1: ICD-10-CM

## 2022-07-13 PROCEDURE — 95911 PR NERVE CONDUCTION STUDY; 9-10 STUDIES: ICD-10-PCS | Mod: S$GLB,,, | Performed by: PHYSICAL MEDICINE & REHABILITATION

## 2022-07-13 PROCEDURE — 95911 NRV CNDJ TEST 9-10 STUDIES: CPT | Mod: S$GLB,,, | Performed by: PHYSICAL MEDICINE & REHABILITATION

## 2022-07-13 PROCEDURE — 95886 MUSC TEST DONE W/N TEST COMP: CPT | Mod: S$GLB,,, | Performed by: PHYSICAL MEDICINE & REHABILITATION

## 2022-07-13 PROCEDURE — 95886 PR EMG COMPLETE, W/ NERVE CONDUCTION STUDIES, 5+ MUSCLES: ICD-10-PCS | Mod: S$GLB,,, | Performed by: PHYSICAL MEDICINE & REHABILITATION

## 2022-07-13 NOTE — PROCEDURES
Test Date:  2022    Patient: Lisa Garcia : 1985 Physician: Peter Ba D.O.   ID#:  SEX: Male Ref. Phys: Peter Allen DO     HPI: Lisa Garcia is a 37 y.o.male who presents for NCS/EMG to evaluate for left lumbosacral radiculopathy.  Pt with pain/n/t that radiates from the glut medius area down the posterolateral leg to the anterolateral leg distally.  He is uncertain if this radiates to the plantar or dorsal aspect of the foot.  He does not have any significant weakness.  He does have low back pain.  MRI showed severe far lateral stenosis of the L4/5 and L5/S1 foramen.  NCS/EMG requested to try and tease out if one particular nerve root is the culprit.        NCV & EMG Findings:   Evaluation of the left Superficial Fibular sensory nerve showed reduced amplitude.   All remaining nerves (as indicated in the following tables) were within normal limits.   All H Reflex latencies were within normal limits.   Needle evaluation of the left Fibularis Longus muscle showed increased motor unit amplitude.   All remaining muscles (as indicated in the following table) showed no evidence of electrical instability.    Impression:  There was a decreased superficial peroneal SNAP amplitude along with chronic neuropathic changes in the left peroneus longus muscle.  From an electrodiagnostic standpoint alone, this is most consistent with a superficial peroneal neuropathy.  However, this does not fit his history, and there is another plausible explanation that I believe is the cause of his symptoms, namely a left L5 radiculopathy.     Further explanation: While sensory nerve responses are typically normal in radiculopathy, the one exception to this is the superficial peroneal nerve with an L5 radiculopathy.  The peroneus longus muscle is supplied by the L5 and S1 nerve roots.  So these changes may be seen with an L5 radiculopathy.  Therefore, while the electrophysiologic evidence is somewhat  weak for an L5 radiculopathy, it is a plausible explanation and does make the most sense given his clinical history and MRI findings.            ___________________________  Peter Ba D.O.        NCS+  Motor Nerve Results      Latency Amplitude F-Lat Segment Distance CV Comment   Site (ms) Norm (mV) Norm (ms)  (cm) (m/s) Norm    Left Fibular (EDB)   Ankle 4.3  < 6.5 6.3  > 2.6         Bel Fib Head 11.6 - 5.5 -  Bel Fib Head-Ankle 37 51  > 37    Right Fibular (EDB)   Ankle 3.5  < 6.5 9.9  > 2.6         Bel Fib Head 10.4 - 8.2 -  Bel Fib Head-Ankle 37 54  > 37    Left Tibial (AHB)   Ankle 4.8  < 6.1 7.3  > 5.3         Right Tibial (AHB)   Ankle 3.7  < 6.1 11.0  > 5.3           Sensory Nerve Results      Latency (Peak) Amplitude (P-P) Segment Distance CV Comment   Site (ms) Norm (µV) Norm  (cm) (m/s) Norm    Left Sural   Calf-Lat Mall 3.1  < 4.5 11  > 4 Calf-Lat Mall 14 45  > 35    Left Superficial Fibular   14 cm-Ankle 3.4  < 4.2 *5  > 5 14 cm-Ankle 14 41  > 32    Right Superficial Fibular   14 cm-Ankle 2.7  < 4.2 14  > 5 14 cm-Ankle 14 52  > 32      H-Reflex Results      M-Lat H Lat H-M Lat   Site (ms) (ms) Norm (ms)   Left Tibial (Soleus)   Pop Fossa 8.1 31.5 27.4...33.6 23.4   Right Tibial (Soleus)   Pop Fossa 3.6 31.6 27.4...33.6 28.0     EMG+     Side Muscle Nerve Root Ins Act Fibs Psw Amp Dur Poly Recrt Int Pat Comment   Left Vastus Med Femoral L2-L4 Nml Nml Nml Nml Nml 0 Nml Nml    Left Lumbo Parasp (Mid) Rami L3-L4 Nml Nml Nml         Left Tib Anterior Fibular,  Deep Fibula... L4-L5 Nml Nml Nml Nml Nml 0 Nml Nml    Left Gluteus Med Sup Gluteal L5-S1 Nml Nml Nml Nml Nml 0 Nml Nml    Left Fib Longus Fibular,  Superficial... L5-S1 Nml Nml Nml *Incr Nml 0 Nml Nml 4mV MUAPs   Left Lumbo Parasp (Lower) Rami L5-S1 Nml Nml Nml         Left Gluteus Max Inf Gluteal L5-S2 Nml Nml Nml Nml Nml 0 Nml Nml    Left Gastroc Tibial S1-S2 Nml Nml Nml Nml Nml 0 Nml Nml    Left Dorsal Interossei ped I Lateral Plantar  S2-S3 Nml Nml Nml Nml Nml 0 Nml Nml            Waveforms:    Motor              Sensory         H-Reflex

## 2022-07-15 ENCOUNTER — PATIENT MESSAGE (OUTPATIENT)
Dept: NEUROSURGERY | Facility: CLINIC | Age: 37
End: 2022-07-15
Payer: COMMERCIAL

## 2022-08-01 ENCOUNTER — PATIENT MESSAGE (OUTPATIENT)
Dept: NEUROSURGERY | Facility: CLINIC | Age: 37
End: 2022-08-01
Payer: COMMERCIAL

## 2022-08-02 DIAGNOSIS — M54.16 LUMBAR RADICULOPATHY: Primary | ICD-10-CM

## 2022-08-03 ENCOUNTER — PATIENT MESSAGE (OUTPATIENT)
Dept: PAIN MEDICINE | Facility: OTHER | Age: 37
End: 2022-08-03
Payer: COMMERCIAL

## 2022-08-03 DIAGNOSIS — M54.16 LUMBAR RADICULOPATHY: Primary | ICD-10-CM

## 2022-08-17 ENCOUNTER — HOSPITAL ENCOUNTER (OUTPATIENT)
Facility: OTHER | Age: 37
Discharge: HOME | End: 2022-08-17
Attending: ANESTHESIOLOGY | Admitting: ANESTHESIOLOGY
Payer: COMMERCIAL

## 2022-08-17 VITALS
HEART RATE: 55 BPM | WEIGHT: 225 LBS | OXYGEN SATURATION: 99 % | RESPIRATION RATE: 12 BRPM | DIASTOLIC BLOOD PRESSURE: 78 MMHG | SYSTOLIC BLOOD PRESSURE: 127 MMHG | HEIGHT: 74 IN | BODY MASS INDEX: 28.88 KG/M2 | TEMPERATURE: 98 F

## 2022-08-17 DIAGNOSIS — M51.36 DDD (DEGENERATIVE DISC DISEASE), LUMBAR: Primary | ICD-10-CM

## 2022-08-17 DIAGNOSIS — G89.29 CHRONIC PAIN: ICD-10-CM

## 2022-08-17 DIAGNOSIS — M54.16 LUMBAR RADICULOPATHY: ICD-10-CM

## 2022-08-17 PROCEDURE — 25500020 PHARM REV CODE 255: Performed by: ANESTHESIOLOGY

## 2022-08-17 PROCEDURE — 62323 NJX INTERLAMINAR LMBR/SAC: CPT | Performed by: ANESTHESIOLOGY

## 2022-08-17 PROCEDURE — 25000003 PHARM REV CODE 250: Performed by: ANESTHESIOLOGY

## 2022-08-17 PROCEDURE — 62323 NJX INTERLAMINAR LMBR/SAC: CPT | Mod: ,,, | Performed by: ANESTHESIOLOGY

## 2022-08-17 PROCEDURE — 62323 PR INJ LUMBAR/SACRAL, W/IMAGING GUIDANCE: ICD-10-PCS | Mod: ,,, | Performed by: ANESTHESIOLOGY

## 2022-08-17 PROCEDURE — 63600175 PHARM REV CODE 636 W HCPCS: Performed by: ANESTHESIOLOGY

## 2022-08-17 RX ORDER — SODIUM CHLORIDE 9 MG/ML
500 INJECTION, SOLUTION INTRAVENOUS CONTINUOUS
Status: DISCONTINUED | OUTPATIENT
Start: 2022-08-17 | End: 2022-08-17 | Stop reason: HOSPADM

## 2022-08-17 RX ORDER — ONDANSETRON 2 MG/ML
4 INJECTION INTRAMUSCULAR; INTRAVENOUS ONCE
Status: COMPLETED | OUTPATIENT
Start: 2022-08-17 | End: 2022-08-17

## 2022-08-17 RX ORDER — MIDAZOLAM HYDROCHLORIDE 1 MG/ML
INJECTION INTRAMUSCULAR; INTRAVENOUS
Status: DISCONTINUED | OUTPATIENT
Start: 2022-08-17 | End: 2022-08-17 | Stop reason: HOSPADM

## 2022-08-17 RX ORDER — LIDOCAINE HYDROCHLORIDE 20 MG/ML
INJECTION, SOLUTION INFILTRATION; PERINEURAL
Status: DISCONTINUED | OUTPATIENT
Start: 2022-08-17 | End: 2022-08-17 | Stop reason: HOSPADM

## 2022-08-17 RX ORDER — FENTANYL CITRATE 50 UG/ML
INJECTION, SOLUTION INTRAMUSCULAR; INTRAVENOUS
Status: DISCONTINUED | OUTPATIENT
Start: 2022-08-17 | End: 2022-08-17 | Stop reason: HOSPADM

## 2022-08-17 RX ORDER — LIDOCAINE HYDROCHLORIDE 10 MG/ML
INJECTION, SOLUTION EPIDURAL; INFILTRATION; INTRACAUDAL; PERINEURAL
Status: DISCONTINUED | OUTPATIENT
Start: 2022-08-17 | End: 2022-08-17 | Stop reason: HOSPADM

## 2022-08-17 RX ORDER — DEXAMETHASONE SODIUM PHOSPHATE 10 MG/ML
INJECTION INTRAMUSCULAR; INTRAVENOUS
Status: DISCONTINUED | OUTPATIENT
Start: 2022-08-17 | End: 2022-08-17 | Stop reason: HOSPADM

## 2022-08-17 NOTE — DISCHARGE INSTRUCTIONS

## 2022-08-17 NOTE — BRIEF OP NOTE
Discharge Note  Short Stay      SUMMARY     Admit Date: 8/17/2022    Attending Physician: Beata Jasso      Discharge Physician: Beata Jasso      Discharge Date: 8/17/2022 3:10 PM    Procedure(s) (LRB):  LUMBAR L4/5 MITCH CONTRAST DIRECT REFERRAL (N/A)    Final Diagnosis: Lumbar radiculopathy [M54.16]    Disposition: Home or self care    Patient Instructions:   Current Discharge Medication List      CONTINUE these medications which have NOT CHANGED    Details   clindamycin (CLEOCIN T) 1 % external solution APPLY TO AFFECTED AREA TOPICALLY TWICE A DAY  Qty: 60 mL, Refills: 2    Associated Diagnoses: Alopecia of scalp; Acne vulgaris      gabapentin (NEURONTIN) 300 MG capsule Take 1 capsule (300 mg total) by mouth every evening.  Qty: 90 capsule, Refills: 0    Associated Diagnoses: Lumbar radiculopathy      HYDROcodone-acetaminophen (NORCO) 5-325 mg per tablet Take 1 tablet by mouth every 6 (six) hours as needed.      meclizine (ANTIVERT) 25 mg tablet Take 1 tablet (25 mg total) by mouth 2 (two) times daily as needed for Dizziness.  Qty: 20 tablet, Refills: 0    Associated Diagnoses: Post concussive syndrome; Benign paroxysmal positional vertigo of right ear      meloxicam (MOBIC) 15 MG tablet Take 1 tablet (15 mg total) by mouth once daily.  Qty: 60 tablet, Refills: 4    Associated Diagnoses: Lumbar radiculopathy; DDD (degenerative disc disease), lumbar      multivitamin (THERAGRAN) per tablet Take 1 tablet by mouth once daily.      ondansetron (ZOFRAN-ODT) 4 MG TbDL Take 2 tablets (8 mg total) by mouth every 12 (twelve) hours as needed (NAUSEA).  Qty: 20 tablet, Refills: 0    Associated Diagnoses: Post concussive syndrome; Benign paroxysmal positional vertigo of right ear                 Discharge Diagnosis: Lumbar radiculopathy [M54.16]  Condition on Discharge: Stable with no complications to procedure   Diet on Discharge: Same as before.  Activity: as per instruction sheet.  Discharge to: Home with a responsible  adult.  Follow up: 2-4 weeks       Please call my office or pager at [phone] if experienced any weakness or loss of sensation, fever > 101.5, pain uncontrolled with oral medications, persistent nausea/vomiting/or diarrhea, redness or drainage from the incisions, or any other worrisome concerns. If physician on call was not reached or could not communicate with our office for any reason please go to the nearest emergency department        Beata Jasso MD

## 2022-08-17 NOTE — DISCHARGE SUMMARY
Discharge Note  Short Stay      SUMMARY     Admit Date: 8/17/2022    Attending Physician: Beata Jasso      Discharge Physician: Beata Jasso      Discharge Date: 8/17/2022 3:20 PM    Procedure(s) (LRB):  LUMBAR L4/5 MITCH CONTRAST DIRECT REFERRAL (N/A)    Final Diagnosis: Lumbar radiculopathy [M54.16]    Disposition: Home or self care    Patient Instructions:   Current Discharge Medication List      CONTINUE these medications which have NOT CHANGED    Details   clindamycin (CLEOCIN T) 1 % external solution APPLY TO AFFECTED AREA TOPICALLY TWICE A DAY  Qty: 60 mL, Refills: 2    Associated Diagnoses: Alopecia of scalp; Acne vulgaris      gabapentin (NEURONTIN) 300 MG capsule Take 1 capsule (300 mg total) by mouth every evening.  Qty: 90 capsule, Refills: 0    Associated Diagnoses: Lumbar radiculopathy      HYDROcodone-acetaminophen (NORCO) 5-325 mg per tablet Take 1 tablet by mouth every 6 (six) hours as needed.      meclizine (ANTIVERT) 25 mg tablet Take 1 tablet (25 mg total) by mouth 2 (two) times daily as needed for Dizziness.  Qty: 20 tablet, Refills: 0    Associated Diagnoses: Post concussive syndrome; Benign paroxysmal positional vertigo of right ear      meloxicam (MOBIC) 15 MG tablet Take 1 tablet (15 mg total) by mouth once daily.  Qty: 60 tablet, Refills: 4    Associated Diagnoses: Lumbar radiculopathy; DDD (degenerative disc disease), lumbar      multivitamin (THERAGRAN) per tablet Take 1 tablet by mouth once daily.      ondansetron (ZOFRAN-ODT) 4 MG TbDL Take 2 tablets (8 mg total) by mouth every 12 (twelve) hours as needed (NAUSEA).  Qty: 20 tablet, Refills: 0    Associated Diagnoses: Post concussive syndrome; Benign paroxysmal positional vertigo of right ear                 Discharge Diagnosis: Lumbar radiculopathy [M54.16]  Condition on Discharge: Stable with no complications to procedure   Diet on Discharge: Same as before.  Activity: as per instruction sheet.  Discharge to: Home with a responsible  adult.  Follow up: 2-4 weeks       Please call my office or pager at [phone] if experienced any weakness or loss of sensation, fever > 101.5, pain uncontrolled with oral medications, persistent nausea/vomiting/or diarrhea, redness or drainage from the incisions, or any other worrisome concerns. If physician on call was not reached or could not communicate with our office for any reason please go to the nearest emergency department        Beata Jasso MD

## 2022-08-17 NOTE — DISCHARGE SUMMARY
Discharge Note  Short Stay      SUMMARY     Admit Date: 8/17/2022    Attending Physician: Beata Jasso      Discharge Physician: Beata Jasso      Discharge Date: 8/17/2022 3:21 PM    Procedure(s) (LRB):  LUMBAR L4/5 MITCH CONTRAST DIRECT REFERRAL (N/A)    Final Diagnosis: Lumbar radiculopathy [M54.16]    Disposition: Home or self care    Patient Instructions:   Current Discharge Medication List      CONTINUE these medications which have NOT CHANGED    Details   clindamycin (CLEOCIN T) 1 % external solution APPLY TO AFFECTED AREA TOPICALLY TWICE A DAY  Qty: 60 mL, Refills: 2    Associated Diagnoses: Alopecia of scalp; Acne vulgaris      gabapentin (NEURONTIN) 300 MG capsule Take 1 capsule (300 mg total) by mouth every evening.  Qty: 90 capsule, Refills: 0    Associated Diagnoses: Lumbar radiculopathy      HYDROcodone-acetaminophen (NORCO) 5-325 mg per tablet Take 1 tablet by mouth every 6 (six) hours as needed.      meclizine (ANTIVERT) 25 mg tablet Take 1 tablet (25 mg total) by mouth 2 (two) times daily as needed for Dizziness.  Qty: 20 tablet, Refills: 0    Associated Diagnoses: Post concussive syndrome; Benign paroxysmal positional vertigo of right ear      meloxicam (MOBIC) 15 MG tablet Take 1 tablet (15 mg total) by mouth once daily.  Qty: 60 tablet, Refills: 4    Associated Diagnoses: Lumbar radiculopathy; DDD (degenerative disc disease), lumbar      multivitamin (THERAGRAN) per tablet Take 1 tablet by mouth once daily.      ondansetron (ZOFRAN-ODT) 4 MG TbDL Take 2 tablets (8 mg total) by mouth every 12 (twelve) hours as needed (NAUSEA).  Qty: 20 tablet, Refills: 0    Associated Diagnoses: Post concussive syndrome; Benign paroxysmal positional vertigo of right ear                 Discharge Diagnosis: Lumbar radiculopathy [M54.16]  Condition on Discharge: Stable with no complications to procedure   Diet on Discharge: Same as before.  Activity: as per instruction sheet.  Discharge to: Home with a responsible  adult.  Follow up: 2-4 weeks       Please call my office or pager at [phone] if experienced any weakness or loss of sensation, fever > 101.5, pain uncontrolled with oral medications, persistent nausea/vomiting/or diarrhea, redness or drainage from the incisions, or any other worrisome concerns. If physician on call was not reached or could not communicate with our office for any reason please go to the nearest emergency department        Beata Jasso MD

## 2022-08-17 NOTE — OP NOTE
Lumbar Interlaminar Epidural Steroid Injection under Fluoroscopic Guidance    The procedure, risks, benefits, and options were discussed with the patient. There are no contraindications to the procedure. The patent expressed understanding and agreed to the procedure. Informed written consent was obtained prior to the start of the procedure and can be found in the patient's chart.    PATIENT NAME: Lisa Garcia   MRN: [id number]     DATE OF PROCEDURE: 08/17/2022    PROCEDURE: Lumbar Interlaminar Epidural Steroid Injection L4/L5 under Fluoroscopic Guidance    PRE-OP DIAGNOSIS: Lumbar radiculopathy [M54.16] Lumbar radiculopathy [M54.16] DDD    POST-OP DIAGNOSIS: Same    PHYSICIAN: Zurdo Henry MD    ASSISTANTS: Beata Jasso MD [facility] Fellow     MEDICATIONS INJECTED: Preservative-free Decadron 10mg with 4cc of Lidocaine 1% MPF and preservative free normal saline    LOCAL ANESTHETIC INJECTED: Xylocaine 2%     SEDATION: Versed 1mg and Fentanyl 50mcg                                                                                                                                                                                     Conscious sedation ordered by M.D. Patient re-evaluation prior to administration of conscious sedation. No changes noted in patient's status from initial evaluation. The patient's vital signs were monitored by RN and patient remained hemodynamically stable throughout the procedure.    Event Time In   Sedation Start 1515   Sedation End 1521       ESTIMATED BLOOD LOSS: None    COMPLICATIONS: None    TECHNIQUE: Time-out was performed to identify the patient and procedure to be performed. With the patient laying in a prone position, the surgical area was prepped and draped in the usual sterile fashion using ChloraPrep and a fenestrated drape. The level was determined under fluoroscopy guidance. Skin anesthesia was achieved by injecting Lidocaine 2% over the injection site. The interlaminar  space was then approached with a 20 gauge,  3.5 inch Tuohy needle that was introduced under fluoroscopic guidance in the AP, lateral and/or contralateral oblique imaging. Once the Ligamentum flavum was encountered loss of resistance to saline was used to enter the epidural space. With positive loss of resistance and negative aspiration for CSF or Blood, contrast dye Omnipaque (240mg/mL) was injected to confirm placement and there was no vascular runoff. 5 mL of the medication mixture listed above was injected slowly. Displacement of the radio opaque contrast after injection of the medication confirmed that the medication went into the area of the epidural space. The needles were removed and bleeding was nil. A sterile dressing was applied. No specimens collected. The patient tolerated the procedure well.       The patient was monitored after the procedure in the recovery area. They were given post-procedure and discharge instructions to follow at home. The patient was discharged in a stable condition.    Beata Jasso MD    I reviewed and edited the fellow's note. I conducted my own interview and physical examination. I agree with the findings. I was present and supervising all critical portions of the procedure.    Zurdo Henry MD

## 2022-08-17 NOTE — H&P
HPI  Patient presents to the procedure area for direct referral of L4/5 ILESI. Pt referred here from Ortho clinic. Also following with Neurosurgery, plan for EMG.         Past Medical History:   Diagnosis Date    Alopecia of scalp 2009    DDD (degenerative disc disease), lumbar     L4/5    Ganglion cyst     Left hand, b/t 1st/2nd MCP.    Greater trochanteric bursitis, right     Patellofemoral pain syndrome of left knee     Tendinosis of rotator cuff     Right     Past Surgical History:   Procedure Laterality Date    APPENDECTOMY      TRANSFORAMINAL EPIDURAL INJECTION OF STEROID Bilateral 5/25/2022    Procedure: INJECTION, STEROID, EPIDURAL, TF BILATERAL L4-L5 CONTRAST DIRECT REF;  Surgeon: Zurdo Henry MD;  Location: [facility];  Service: Pain Management;  Laterality: Bilateral;     Review of patient's allergies indicates:  No Known Allergies     PMHx, PSHx, Allergies, Medications reviewed in epic      ROS negative except pain complaints in HPI    OBJECTIVE:    There were no vitals taken for this visit.    PHYSICAL EXAMINATION:    GENERAL: Well appearing, in no acute distress, alert and oriented x3.  PSYCH:  Mood and affect appropriate.  SKIN: Skin color, texture, turgor normal, no rashes or lesions.  CV: RRR with palpation of the radial artery.  PULM: No evidence of respiratory difficulty, symmetric chest rise. Clear to auscultation.  NEURO: Cranial nerves grossly intact.    Plan:    Proceed with procedure as planned    Jovi Soto  08/17/2022

## 2022-08-29 ENCOUNTER — OFFICE VISIT (OUTPATIENT)
Dept: NEUROSURGERY | Facility: CLINIC | Age: 37
End: 2022-08-29
Payer: COMMERCIAL

## 2022-08-29 VITALS — DIASTOLIC BLOOD PRESSURE: 73 MMHG | HEART RATE: 58 BPM | SYSTOLIC BLOOD PRESSURE: 121 MMHG

## 2022-08-29 DIAGNOSIS — M54.17 LUMBOSACRAL RADICULOPATHY AT L5: Primary | ICD-10-CM

## 2022-08-29 PROCEDURE — 1159F MED LIST DOCD IN RCRD: CPT | Mod: CPTII,S$GLB,, | Performed by: STUDENT IN AN ORGANIZED HEALTH CARE EDUCATION/TRAINING PROGRAM

## 2022-08-29 PROCEDURE — 99999 PR PBB SHADOW E&M-EST. PATIENT-LVL III: ICD-10-PCS | Mod: PBBFAC,,, | Performed by: STUDENT IN AN ORGANIZED HEALTH CARE EDUCATION/TRAINING PROGRAM

## 2022-08-29 PROCEDURE — 3078F PR MOST RECENT DIASTOLIC BLOOD PRESSURE < 80 MM HG: ICD-10-PCS | Mod: CPTII,S$GLB,, | Performed by: STUDENT IN AN ORGANIZED HEALTH CARE EDUCATION/TRAINING PROGRAM

## 2022-08-29 PROCEDURE — 3078F DIAST BP <80 MM HG: CPT | Mod: CPTII,S$GLB,, | Performed by: STUDENT IN AN ORGANIZED HEALTH CARE EDUCATION/TRAINING PROGRAM

## 2022-08-29 PROCEDURE — 3044F PR MOST RECENT HEMOGLOBIN A1C LEVEL <7.0%: ICD-10-PCS | Mod: CPTII,S$GLB,, | Performed by: STUDENT IN AN ORGANIZED HEALTH CARE EDUCATION/TRAINING PROGRAM

## 2022-08-29 PROCEDURE — 99214 PR OFFICE/OUTPT VISIT, EST, LEVL IV, 30-39 MIN: ICD-10-PCS | Mod: S$GLB,,, | Performed by: STUDENT IN AN ORGANIZED HEALTH CARE EDUCATION/TRAINING PROGRAM

## 2022-08-29 PROCEDURE — 3044F HG A1C LEVEL LT 7.0%: CPT | Mod: CPTII,S$GLB,, | Performed by: STUDENT IN AN ORGANIZED HEALTH CARE EDUCATION/TRAINING PROGRAM

## 2022-08-29 PROCEDURE — 99214 OFFICE O/P EST MOD 30 MIN: CPT | Mod: S$GLB,,, | Performed by: STUDENT IN AN ORGANIZED HEALTH CARE EDUCATION/TRAINING PROGRAM

## 2022-08-29 PROCEDURE — 1159F PR MEDICATION LIST DOCUMENTED IN MEDICAL RECORD: ICD-10-PCS | Mod: CPTII,S$GLB,, | Performed by: STUDENT IN AN ORGANIZED HEALTH CARE EDUCATION/TRAINING PROGRAM

## 2022-08-29 PROCEDURE — 3074F SYST BP LT 130 MM HG: CPT | Mod: CPTII,S$GLB,, | Performed by: STUDENT IN AN ORGANIZED HEALTH CARE EDUCATION/TRAINING PROGRAM

## 2022-08-29 PROCEDURE — 99999 PR PBB SHADOW E&M-EST. PATIENT-LVL III: CPT | Mod: PBBFAC,,, | Performed by: STUDENT IN AN ORGANIZED HEALTH CARE EDUCATION/TRAINING PROGRAM

## 2022-08-29 PROCEDURE — 3074F PR MOST RECENT SYSTOLIC BLOOD PRESSURE < 130 MM HG: ICD-10-PCS | Mod: CPTII,S$GLB,, | Performed by: STUDENT IN AN ORGANIZED HEALTH CARE EDUCATION/TRAINING PROGRAM

## 2022-08-29 NOTE — PROGRESS NOTES
Neurosurgery  Established Patient    SUBJECTIVE:     History of Present Illness:  36 M presents for eval of chronic low back pain and more recent LLE numbness.  His left leg symptoms are worse than his back pain.  Back pain is located at lower lumbar area.  He endorses LLE numbness that radiates into sole of his left foot when he is standing and walking.  This has worsened over the past month.  He has no RLE symptoms.  He had bilateral L4/5 TFESIs in may of 2022 which gave him no relief of his LLE numbness.  He has been to PT for his low back before.  He is scheduled for another injection with pain mgmt this Wednesday.  He has no bowel/bladder incontinence or saddle anesthesia.    Interval fu 8/29/22:  Pt had L4/5 MITCH on 8/17/22.  He has had no improvement in his left LE radicular leg pain or numbness.  He reiterates that his low back pain is not as severe as his leg pain/numbness.  His leg pain radiates into the sole of his left foot but not his lateral foot or lateral ankle.  Sitting/laying down relieves the leg pain and standing worsens it.  He has no foot weakness.    Review of patient's allergies indicates:  No Known Allergies    Current Outpatient Medications   Medication Sig Dispense Refill    clindamycin (CLEOCIN T) 1 % external solution APPLY TO AFFECTED AREA TOPICALLY TWICE A DAY 60 mL 2    HYDROcodone-acetaminophen (NORCO) 5-325 mg per tablet Take 1 tablet by mouth every 6 (six) hours as needed.      meclizine (ANTIVERT) 25 mg tablet Take 1 tablet (25 mg total) by mouth 2 (two) times daily as needed for Dizziness. 20 tablet 0    meloxicam (MOBIC) 15 MG tablet Take 1 tablet (15 mg total) by mouth once daily. 60 tablet 4    multivitamin (THERAGRAN) per tablet Take 1 tablet by mouth once daily.      ondansetron (ZOFRAN-ODT) 4 MG TbDL Take 2 tablets (8 mg total) by mouth every 12 (twelve) hours as needed (NAUSEA). 20 tablet 0    gabapentin (NEURONTIN) 300 MG capsule Take 1 capsule (300 mg total) by mouth  every evening. 90 capsule 0     No current facility-administered medications for this visit.       Past Medical History:   Diagnosis Date    Alopecia of scalp 2009    DDD (degenerative disc disease), lumbar     L4/5    Ganglion cyst     Left hand, b/t 1st/2nd MCP.    Greater trochanteric bursitis, right     Patellofemoral pain syndrome of left knee     Tendinosis of rotator cuff     Right     Past Surgical History:   Procedure Laterality Date    APPENDECTOMY      EPIDURAL STEROID INJECTION N/A 8/17/2022    Procedure: LUMBAR L4/5 MITCH CONTRAST DIRECT REFERRAL;  Surgeon: Zurdo Henry MD;  Location: [facility], [place], operated by [facility];  Service: Pain Management;  Laterality: N/A;    TRANSFORAMINAL EPIDURAL INJECTION OF STEROID Bilateral 5/25/2022    Procedure: INJECTION, STEROID, EPIDURAL, TF BILATERAL L4-L5 CONTRAST DIRECT REF;  Surgeon: Zurdo Henry MD;  Location: [facility], [place], operated [facility];  Service: Pain Management;  Laterality: Bilateral;     Family History       Problem Relation (Age of Onset)    Hypothyroidism Mother    Lung cancer Paternal Grandfather    Osteoporosis Father          Social History     Socioeconomic History    Marital status:     Number of children: 0   Occupational History    Occupation:    Tobacco Use    Smoking status: Some Days     Types: Cigars    Smokeless tobacco: Never    Tobacco comments:     once every few months   Substance and Sexual Activity    Alcohol use: Yes     Alcohol/week: 5.0 standard drinks     Types: 5 Standard drinks or equivalent per week     Comment: Social, scotch, bourbon    Drug use: Yes     Types: Marijuana     Comment: rare    Sexual activity: Yes     Partners: Female       Review of Systems  14 point ROS was negative    OBJECTIVE:     Vital Signs  Pulse: (!) 58  BP: 121/73  Pain Score: 0-No pain  There is no height or weight on file to calculate BMI.    Neurosurgery Physical Exam  Constitutional: He appears well-developed and well-nourished.      Eyes: Pupils are equal,  round, and reactive to light.      Cardiovascular: Normal rate and regular rhythm.      Abdominal: Soft.     Psych/Behavior: He is alert. He is oriented to person, place, and time. He has a normal mood and affect.     Musculoskeletal: Gait is normal.        Neck: Range of motion is full.        Back: Range of motion is limited.        Right Upper Extremities: Muscle strength is 5/5.        Left Upper Extremities: Muscle strength is 5/5.       Right Lower Extremities: Muscle strength is 5/5.        Left Lower Extremities: Muscle strength is 5/5.     Neurological:        Coordination: He has a normal Romberg Test and normal tandem walking coordination.        Sensory: There is no sensory deficit in the trunk. There is no sensory deficit in the extremities.        DTRs: DTRs are DTRS NORMAL AND SYMMETRICnormal and symmetric.        Cranial nerves: Cranial nerve(s) II, III, IV, V, VI, VII, VIII, IX, X, XI and XII are intact.      No curry's     Negative facet loading in lumbar spine bilaterally     No TTP throughout lumbar spine     Able to walk on heels/toes.    Diagnostic Results:  EMG BLE: left L5 radiculopathy  Reviewed    ASSESSMENT/PLAN:     37 M with lumbar stenosis at L4/5, 5/S1.  His EMG confirms an L5 radiculopathy which I believe is caused by the severe left lateral recess stenosis at L4/5.  He has failed conservative mgmt.  I have offered a left L4/5 MIS decompression.  The patient wishes to think about the procedure and will contact us regarding a decision.

## 2022-10-12 ENCOUNTER — OFFICE VISIT (OUTPATIENT)
Dept: ORTHOPEDICS | Facility: CLINIC | Age: 37
End: 2022-10-12
Payer: COMMERCIAL

## 2022-10-12 VITALS — HEIGHT: 74 IN | WEIGHT: 222.13 LBS | BODY MASS INDEX: 28.51 KG/M2

## 2022-10-12 DIAGNOSIS — M54.16 LUMBAR RADICULOPATHY: Primary | ICD-10-CM

## 2022-10-12 PROCEDURE — 3044F HG A1C LEVEL LT 7.0%: CPT | Mod: CPTII,S$GLB,, | Performed by: ORTHOPAEDIC SURGERY

## 2022-10-12 PROCEDURE — 1159F PR MEDICATION LIST DOCUMENTED IN MEDICAL RECORD: ICD-10-PCS | Mod: CPTII,S$GLB,, | Performed by: ORTHOPAEDIC SURGERY

## 2022-10-12 PROCEDURE — 99999 PR PBB SHADOW E&M-EST. PATIENT-LVL III: ICD-10-PCS | Mod: PBBFAC,,, | Performed by: ORTHOPAEDIC SURGERY

## 2022-10-12 PROCEDURE — 99214 OFFICE O/P EST MOD 30 MIN: CPT | Mod: S$GLB,,, | Performed by: ORTHOPAEDIC SURGERY

## 2022-10-12 PROCEDURE — 99214 PR OFFICE/OUTPT VISIT, EST, LEVL IV, 30-39 MIN: ICD-10-PCS | Mod: S$GLB,,, | Performed by: ORTHOPAEDIC SURGERY

## 2022-10-12 PROCEDURE — 3044F PR MOST RECENT HEMOGLOBIN A1C LEVEL <7.0%: ICD-10-PCS | Mod: CPTII,S$GLB,, | Performed by: ORTHOPAEDIC SURGERY

## 2022-10-12 PROCEDURE — 1159F MED LIST DOCD IN RCRD: CPT | Mod: CPTII,S$GLB,, | Performed by: ORTHOPAEDIC SURGERY

## 2022-10-12 PROCEDURE — 99999 PR PBB SHADOW E&M-EST. PATIENT-LVL III: CPT | Mod: PBBFAC,,, | Performed by: ORTHOPAEDIC SURGERY

## 2022-10-14 ENCOUNTER — PATIENT MESSAGE (OUTPATIENT)
Dept: SPORTS MEDICINE | Facility: CLINIC | Age: 37
End: 2022-10-14
Payer: COMMERCIAL

## 2022-10-18 NOTE — PROGRESS NOTES
The patient presents for evaluation.  He has a history of left lower extremity radiculopathy due been treating conservatively.      The patient reports that recently his pain has become worse.  It is worse particularly with riding a bike, and at times he is unable to walk because of this pain.  He takes meloxicam, muscle relaxers, and Tylenol.  He has had 2 epidural steroid injections as well as physical therapy, and hip injection in his right hip.      He has had 2 epidural steroid injections.      On physical examination the patient has a left positive straight leg raise, but no other focal motor or sensory deficits.      Today reviewed AP and lateral lumbar spine radiographs demonstrate notable L4-5 spondylosis.      An MRI of his lumbar spine demonstrates severe L4-5 spondylosis, and left-sided foraminal stenosis.      A recent EMG demonstrates left L5 radiculopathy.    Assessment:  L4-5 spondylosis, left L5 radiculopathy.    Today we discussed options, I have recommended a repeat injection with Dr. Galeana in to his Left hip bursa. If he requires surgery it would likely be a L4/5 TLIF.

## 2022-11-01 ENCOUNTER — OFFICE VISIT (OUTPATIENT)
Dept: SPORTS MEDICINE | Facility: CLINIC | Age: 37
End: 2022-11-01
Payer: COMMERCIAL

## 2022-11-01 VITALS — TEMPERATURE: 98 F | BODY MASS INDEX: 28.23 KG/M2 | HEIGHT: 74 IN | WEIGHT: 220 LBS

## 2022-11-01 DIAGNOSIS — M67.80 TENDINOSIS: Primary | ICD-10-CM

## 2022-11-01 DIAGNOSIS — M47.816 LUMBAR SPONDYLOSIS: ICD-10-CM

## 2022-11-01 DIAGNOSIS — M25.551 BILATERAL HIP PAIN: ICD-10-CM

## 2022-11-01 DIAGNOSIS — S76.012S TEAR OF LEFT GLUTEUS MEDIUS TENDON, SEQUELA: ICD-10-CM

## 2022-11-01 DIAGNOSIS — M25.552 BILATERAL HIP PAIN: ICD-10-CM

## 2022-11-01 DIAGNOSIS — S76.012S TEAR OF LEFT GLUTEUS MINIMUS TENDON, SEQUELA: ICD-10-CM

## 2022-11-01 PROCEDURE — 76942 TENDON ORIGIN: L HIP JOINT: ICD-10-PCS | Mod: S$GLB,,, | Performed by: FAMILY MEDICINE

## 2022-11-01 PROCEDURE — 99999 PR PBB SHADOW E&M-EST. PATIENT-LVL III: ICD-10-PCS | Mod: PBBFAC,,, | Performed by: FAMILY MEDICINE

## 2022-11-01 PROCEDURE — 3044F HG A1C LEVEL LT 7.0%: CPT | Mod: CPTII,S$GLB,, | Performed by: FAMILY MEDICINE

## 2022-11-01 PROCEDURE — 20551 TENDON ORIGIN: L HIP JOINT: ICD-10-PCS | Mod: LT,S$GLB,, | Performed by: FAMILY MEDICINE

## 2022-11-01 PROCEDURE — 99214 OFFICE O/P EST MOD 30 MIN: CPT | Mod: 25,S$GLB,, | Performed by: FAMILY MEDICINE

## 2022-11-01 PROCEDURE — 1160F RVW MEDS BY RX/DR IN RCRD: CPT | Mod: CPTII,S$GLB,, | Performed by: FAMILY MEDICINE

## 2022-11-01 PROCEDURE — 3044F PR MOST RECENT HEMOGLOBIN A1C LEVEL <7.0%: ICD-10-PCS | Mod: CPTII,S$GLB,, | Performed by: FAMILY MEDICINE

## 2022-11-01 PROCEDURE — 99214 PR OFFICE/OUTPT VISIT, EST, LEVL IV, 30-39 MIN: ICD-10-PCS | Mod: 25,S$GLB,, | Performed by: FAMILY MEDICINE

## 2022-11-01 PROCEDURE — 1159F MED LIST DOCD IN RCRD: CPT | Mod: CPTII,S$GLB,, | Performed by: FAMILY MEDICINE

## 2022-11-01 PROCEDURE — 20551 NJX 1 TENDON ORIGIN/INSJ: CPT | Mod: LT,S$GLB,, | Performed by: FAMILY MEDICINE

## 2022-11-01 PROCEDURE — 1160F PR REVIEW ALL MEDS BY PRESCRIBER/CLIN PHARMACIST DOCUMENTED: ICD-10-PCS | Mod: CPTII,S$GLB,, | Performed by: FAMILY MEDICINE

## 2022-11-01 PROCEDURE — 1159F PR MEDICATION LIST DOCUMENTED IN MEDICAL RECORD: ICD-10-PCS | Mod: CPTII,S$GLB,, | Performed by: FAMILY MEDICINE

## 2022-11-01 PROCEDURE — 76942 ECHO GUIDE FOR BIOPSY: CPT | Mod: S$GLB,,, | Performed by: FAMILY MEDICINE

## 2022-11-01 PROCEDURE — 99999 PR PBB SHADOW E&M-EST. PATIENT-LVL III: CPT | Mod: PBBFAC,,, | Performed by: FAMILY MEDICINE

## 2022-11-01 RX ORDER — TRIAMCINOLONE ACETONIDE 40 MG/ML
40 INJECTION, SUSPENSION INTRA-ARTICULAR; INTRAMUSCULAR
Status: DISCONTINUED | OUTPATIENT
Start: 2022-11-01 | End: 2022-11-01 | Stop reason: HOSPADM

## 2022-11-01 RX ADMIN — TRIAMCINOLONE ACETONIDE 40 MG: 40 INJECTION, SUSPENSION INTRA-ARTICULAR; INTRAMUSCULAR at 10:11

## 2022-11-01 NOTE — PROCEDURES
"Tendon Origin: L hip joint    Date/Time: 11/1/2022 10:00 AM  Performed by: Marlo Galeana MD  Authorized by: Marlo Galeana MD     Consent Done?:  Yes (Verbal)  Timeout: prior to procedure the correct patient, procedure, and site was verified    Indications:  Pain  Site marked: the procedure site was marked    Timeout: prior to procedure the correct patient, procedure, and site was verified    Location:  Hip  Site:  L hip joint  Prep: patient was prepped and draped in usual sterile fashion    Ultrasonic Guidance for Needle Placement?: Yes    Needle size:  20 G  Approach:  Posterolateral  Medications:  40 mg triamcinolone acetonide 40 mg/mL  Patient tolerance:  Patient tolerated the procedure well with no immediate complications   Gluteus medius and gluteus minimus muscles, tendon sheaths, and tendon insertions injection    Description of ultrasound utilization for needle guidance:   Ultrasound guidance used for needle localization. Images saved and stored for documentation. The gluteus medius and minimus muscles and tendons were visualized at their insertions on the greater trochanter. Dynamic visualization of the 20g x 3.5" needle was continuous throughout the procedure.  "

## 2022-11-01 NOTE — PROGRESS NOTES
Lisa Garcia, a 37 y.o. male, is here for evaluation of left low back and hip. He notes good relief of right lat glute tendon injection in 2020. Notes chronic low back pain and left sided radiculopathy that has not responded to MITCH or PT.     HISTORY OF PRESENT ILLNESS   Location: #1 lateral/anterior thigh, left   #2 lumbar spine  Onset: Chronic since 2018  Palliative:    Relative rest   Oral analgesics - Meloxicam (7.5mg) with moderate relief   Healthy back fPT for lumbar spine/hip   fPT, @[facility] date: 05.28.2020, duration: 7 visits, significant Improvement with hip pain, continues to have LBP   CSI gtb r 8/12/2020 good relief    MITCH Dr. Henry 22.05, 22.06, 22.08  Provocative:   ADLs  prolonged sitting  prolonged ambulation   Prior: No hx of Orthopaedic surgery  Progression: worsening discomfort   Quality:    sharp  Radiation: Bilateral LE numbness/tingling when inflammed  Severity: per nursing documentation  Timing: intermittent with use  Trauma: none specific      Review of systems (ROS):  A 10+ review of systems was performed with pertinent positives and negatives noted above in the history of present illness. Other systems were negative unless otherwise specified.      PHYSICAL EXAMINATION  General:  The patient is alert and oriented x 3.  Mood is pleasant.  Observation of ears, eyes and nose reveal no gross abnormalities.  HEENT: NCAT, sclera nonicteric  Lungs: Respirations are equal and unlabored.   Gait is coordinated. Patient can toe walk and heel walk without difficulty.    HIP/PELVIS EXAMINATION    Observation/Inspection  Gait:   Nonantalgic   Alignment:  Neutral   Scars:   None   Muscle atrophy: None   Effusion:  None   Warmth:  None   Discoloration:   None   Leg lengths:   Equal   Pelvis:    Level     Tenderness/Crepitus (T/C):      T / C  Trochanteric bursa   ++ / -  Piriformis    - / -  SI joint    - / -  Psoas tendon   - / -  Rectus insertion  - / -  Adductor insertion  - / -  Pubic  symphysis  - / -    ROM: (* = pain)    Flexion:      120 degrees*  External rotation:   40 degrees  Internal rotation with axial load:  10 degrees*  Internal rotation without axial load:  10 degrees*  Abduction:    45 degrees  Adduction:     20 degrees    Special Tests:  Pain w/ forced internal rotation (FADIR):  ++*   Pain w/ forced external rotation (PREETI):  -   Circumduction test:     -  [facility] test:     -   Log roll:       ++   Snapping hip (internal):    -   Sit-up pain:      +*   Resisted sit-up pain:     +*   Resisted sit-up with adductor contraction pain:  -   Step-down test:     +  Trendelenburg test:     +  Bridge test      +     Extremity Neuro-vascular Examination:   Sensation:  Grossly intact to light touch all dermatomal regions.   Motor Function:  Fully intact motor function at hip, knee, foot and ankle    DTRs;  quadriceps and  achilles 2+.  No clonus and downgoing Babinski.    Vascular status:  DP and PT pulses 2+, brisk capillary refill, symmetric.    Skin:  intact, compartments soft.    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  #2 Tendinosis of lat glut tendons, bilat  Hip pain / LE pain left > right    No evidence of vascular pathology    Imaging studies reviewed:   X-ray lumbar spine 22.04  Mri lumbar spine 22.05  X-ray hip, kev 20.05  EMG 22.07    Plan  We discussed the importance of appropriate diet, weight, and regular exercise    We discussed options including    Watchful waiting / relative rest    Physical therapy x   Injection therapy CSI lat glut tend left   Consultation    The patient chooses As above   x = prescribed  CSI = corticosteroid injection  VSI = viscosupplement injection  PRPI = platelet rich plasma injection  ia = intra articular  R = right  L = left  B = bilateral   nfSx = surgical consultation was recommended, but patient is not interested in consultation at this time    Physical Therapy         Formal (fPT), @ [facility] facility t   Formal (fPT), @ [facility] facility        Homegoing (hgPT), per concurrent fPT recommendations t   Homegoing (hgPT), per prior fPT recommendations    Homegoing (hgPT), handout provided        w/  (atPT)    [blank] = not prescribed  x = prescribed  b = prescribed, and begin as indicated  t = continue as indicated  r = prescribed, and restart as indicated  p = completed prior as indicated  hs = prescribed, and with high school   col = prescribed, and with [facility] or university   nfPT = physical therapy was recommended, but patient is not interested in PT at this time    Activity (e.g. sports, work) restrictions    [blank] = as tolerated  pt = per physical therapist  at = per     Bracing    [blank] = not prescribed  r = recommended, but not fit with at todays visit  f = prescribed and fit with at todays visit  t = continue as indicated  p = prn use on rare, as-needed basis; advised against chronic use    Pain management mp   [blank] = No prescription necessary. A handout detailing dosing of appropriate   over-the-counter musculoskeletal analgesics was made available to the patient.   m = meloxicam x 14 days  mp = 14 day course of meloxicam prescribed prior    Follow up 10d  Via [facility]   [blank] = as needed  [number] = in [number] weeks  CSI = for corticosteroid injection  VSI = for viscosupplement injection or injection series  PRP = for platelet rich plasma injection or injection series  MRI = after MRI imaging  ns = should surgical options be deferred (no surgery)  o = appointment offered, deferred by patient    Should symptoms worsen or fail to resolve, consider    Revisiting the above options and / or TILF w/ [facility]el[facility]re     Vocation:    at [employer], works at [employer]  cycling, running

## 2022-12-04 ENCOUNTER — PATIENT MESSAGE (OUTPATIENT)
Dept: SPORTS MEDICINE | Facility: CLINIC | Age: 37
End: 2022-12-04
Payer: COMMERCIAL

## 2022-12-05 ENCOUNTER — PATIENT MESSAGE (OUTPATIENT)
Dept: ORTHOPEDICS | Facility: CLINIC | Age: 37
End: 2022-12-05
Payer: COMMERCIAL

## 2022-12-05 RX ORDER — DICLOFENAC SODIUM 75 MG/1
75 TABLET, DELAYED RELEASE ORAL 2 TIMES DAILY
Qty: 60 TABLET | Refills: 0 | Status: SHIPPED | OUTPATIENT
Start: 2022-12-05 | End: 2023-01-03

## 2022-12-21 DIAGNOSIS — Z00.00 ROUTINE GENERAL MEDICAL EXAMINATION AT A HEALTH CARE FACILITY: Primary | ICD-10-CM

## 2023-01-12 ENCOUNTER — OFFICE VISIT (OUTPATIENT)
Dept: SPORTS MEDICINE | Facility: CLINIC | Age: 38
End: 2023-01-12
Payer: COMMERCIAL

## 2023-01-12 VITALS
BODY MASS INDEX: 28.23 KG/M2 | HEIGHT: 74 IN | SYSTOLIC BLOOD PRESSURE: 120 MMHG | TEMPERATURE: 98 F | HEART RATE: 59 BPM | WEIGHT: 220 LBS | DIASTOLIC BLOOD PRESSURE: 76 MMHG

## 2023-01-12 DIAGNOSIS — S76.019S TEAR OF GLUTEUS MEDIUS TENDON, UNSPECIFIED LATERALITY, SEQUELA: ICD-10-CM

## 2023-01-12 DIAGNOSIS — M25.551 CHRONIC PAIN OF RIGHT HIP: Primary | ICD-10-CM

## 2023-01-12 DIAGNOSIS — M25.551 BILATERAL HIP PAIN: ICD-10-CM

## 2023-01-12 DIAGNOSIS — S76.011S TEAR OF RIGHT GLUTEUS MINIMUS TENDON, SEQUELA: ICD-10-CM

## 2023-01-12 DIAGNOSIS — G89.29 CHRONIC PAIN OF RIGHT HIP: Primary | ICD-10-CM

## 2023-01-12 DIAGNOSIS — M67.80 TENDINOSIS: ICD-10-CM

## 2023-01-12 DIAGNOSIS — M25.552 BILATERAL HIP PAIN: ICD-10-CM

## 2023-01-12 PROCEDURE — 99999 PR PBB SHADOW E&M-EST. PATIENT-LVL III: ICD-10-PCS | Mod: PBBFAC,,, | Performed by: FAMILY MEDICINE

## 2023-01-12 PROCEDURE — 3008F BODY MASS INDEX DOCD: CPT | Mod: CPTII,S$GLB,, | Performed by: FAMILY MEDICINE

## 2023-01-12 PROCEDURE — 3078F PR MOST RECENT DIASTOLIC BLOOD PRESSURE < 80 MM HG: ICD-10-PCS | Mod: CPTII,S$GLB,, | Performed by: FAMILY MEDICINE

## 2023-01-12 PROCEDURE — 3008F PR BODY MASS INDEX (BMI) DOCUMENTED: ICD-10-PCS | Mod: CPTII,S$GLB,, | Performed by: FAMILY MEDICINE

## 2023-01-12 PROCEDURE — 1160F PR REVIEW ALL MEDS BY PRESCRIBER/CLIN PHARMACIST DOCUMENTED: ICD-10-PCS | Mod: CPTII,S$GLB,, | Performed by: FAMILY MEDICINE

## 2023-01-12 PROCEDURE — 1160F RVW MEDS BY RX/DR IN RCRD: CPT | Mod: CPTII,S$GLB,, | Performed by: FAMILY MEDICINE

## 2023-01-12 PROCEDURE — 76942 ECHO GUIDE FOR BIOPSY: CPT | Mod: 26,S$GLB,, | Performed by: FAMILY MEDICINE

## 2023-01-12 PROCEDURE — 99214 PR OFFICE/OUTPT VISIT, EST, LEVL IV, 30-39 MIN: ICD-10-PCS | Mod: 25,S$GLB,, | Performed by: FAMILY MEDICINE

## 2023-01-12 PROCEDURE — 20551 TENDON ORIGIN: R HIP JOINT: ICD-10-PCS | Mod: RT,S$GLB,, | Performed by: FAMILY MEDICINE

## 2023-01-12 PROCEDURE — 1159F MED LIST DOCD IN RCRD: CPT | Mod: CPTII,S$GLB,, | Performed by: FAMILY MEDICINE

## 2023-01-12 PROCEDURE — 3074F SYST BP LT 130 MM HG: CPT | Mod: CPTII,S$GLB,, | Performed by: FAMILY MEDICINE

## 2023-01-12 PROCEDURE — 3074F PR MOST RECENT SYSTOLIC BLOOD PRESSURE < 130 MM HG: ICD-10-PCS | Mod: CPTII,S$GLB,, | Performed by: FAMILY MEDICINE

## 2023-01-12 PROCEDURE — 76942 TENDON ORIGIN: R HIP JOINT: ICD-10-PCS | Mod: 26,S$GLB,, | Performed by: FAMILY MEDICINE

## 2023-01-12 PROCEDURE — 1159F PR MEDICATION LIST DOCUMENTED IN MEDICAL RECORD: ICD-10-PCS | Mod: CPTII,S$GLB,, | Performed by: FAMILY MEDICINE

## 2023-01-12 PROCEDURE — 3078F DIAST BP <80 MM HG: CPT | Mod: CPTII,S$GLB,, | Performed by: FAMILY MEDICINE

## 2023-01-12 PROCEDURE — 99999 PR PBB SHADOW E&M-EST. PATIENT-LVL III: CPT | Mod: PBBFAC,,, | Performed by: FAMILY MEDICINE

## 2023-01-12 PROCEDURE — 20551 NJX 1 TENDON ORIGIN/INSJ: CPT | Mod: RT,S$GLB,, | Performed by: FAMILY MEDICINE

## 2023-01-12 PROCEDURE — 99214 OFFICE O/P EST MOD 30 MIN: CPT | Mod: 25,S$GLB,, | Performed by: FAMILY MEDICINE

## 2023-01-12 RX ORDER — METHOCARBAMOL 750 MG/1
500 TABLET, FILM COATED ORAL 4 TIMES DAILY
COMMUNITY

## 2023-01-12 RX ORDER — TRIAMCINOLONE ACETONIDE 40 MG/ML
40 INJECTION, SUSPENSION INTRA-ARTICULAR; INTRAMUSCULAR
Status: DISCONTINUED | OUTPATIENT
Start: 2023-01-12 | End: 2023-01-12 | Stop reason: HOSPADM

## 2023-01-12 RX ADMIN — TRIAMCINOLONE ACETONIDE 40 MG: 40 INJECTION, SUSPENSION INTRA-ARTICULAR; INTRAMUSCULAR at 11:01

## 2023-01-12 NOTE — PROCEDURES
"Tendon Origin: R hip joint    Date/Time: 1/12/2023 11:15 AM  Performed by: Marlo Galeana MD  Authorized by: Marlo Galeana MD     Consent Done?:  Yes (Verbal)  Timeout: prior to procedure the correct patient, procedure, and site was verified    Indications:  Pain  Site marked: the procedure site was marked    Timeout: prior to procedure the correct patient, procedure, and site was verified    Location:  Hip  Site:  R hip joint  Prep: patient was prepped and draped in usual sterile fashion    Ultrasonic Guidance for Needle Placement?: Yes    Needle size:  20 G  Approach:  Posterolateral  Medications:  40 mg triamcinolone acetonide 40 mg/mL  Patient tolerance:  Patient tolerated the procedure well with no immediate complications   Gluteus medius and gluteus minimus muscles, tendon sheaths, and tendon insertions injection    Description of ultrasound utilization for needle guidance:   Ultrasound guidance used for needle localization. Images saved and stored for documentation. The gluteus medius and minimus muscles and tendons were visualized at their insertions on the greater trochanter. Dynamic visualization of the 20g x 3.5" needle was continuous throughout the procedure.  "

## 2023-01-12 NOTE — PROGRESS NOTES
Lisa Garcia, a 37 y.o. male, is here for evaluation of left low back and hip. He notes good relief of right lat glute tendon injection in 2020. Notes chronic low back pain and left sided radiculopathy that has not responded to MITCH or PT.     HISTORY OF PRESENT ILLNESS   Location: #1 lateral/anterior thigh, left   #2 lumbar spine  Onset: Chronic since 2018  Palliative:    Relative rest   Oral analgesics - Meloxicam (7.5mg) with moderate relief   Healthy back fPT for lumbar spine/hip   fPT, [facility] date: 05.28.2020, duration: 7 visits, significant Improvement with hip pain, continues to have LBP   CSI gtb r 8/12/2020 good relief    MITCH Dr. Henry 22.05, 22.06, 22.08   CSI lat glut tend left hip, 11/01/22, good relief for 3 weeks   Provocative:   ADLs  prolonged sitting  prolonged ambulation   Prior: No hx of Orthopaedic surgery  Progression: worsening discomfort   Quality:    sharp  Radiation: Bilateral LE numbness/tingling when inflammed  Severity: per nursing documentation  Timing: intermittent with use  Trauma: none specific      Review of systems (ROS):  A 10+ review of systems was performed with pertinent positives and negatives noted above in the history of present illness. Other systems were negative unless otherwise specified.      PHYSICAL EXAMINATION  General:  The patient is alert and oriented x 3.  Mood is pleasant.  Observation of ears, eyes and nose reveal no gross abnormalities.  HEENT: NCAT, sclera nonicteric  Lungs: Respirations are equal and unlabored.   Gait is coordinated. Patient can toe walk and heel walk without difficulty.    HIP/PELVIS EXAMINATION    Observation/Inspection  Gait:   Nonantalgic   Alignment:  Neutral   Scars:   None   Muscle atrophy: None   Effusion:  None   Warmth:  None   Discoloration:   None   Leg lengths:   Equal   Pelvis:    Level     Tenderness/Crepitus (T/C):      T / C  Trochanteric bursa   ++ / -  Piriformis    - / -  SI joint    - / -  Psoas tendon   - /  -  Rectus insertion  - / -  Adductor insertion  - / -  Pubic symphysis  - / -    ROM: (* = pain)    Flexion:      120 degrees*  External rotation:   40 degrees  Internal rotation with axial load:  10 degrees*  Internal rotation without axial load:  10 degrees*  Abduction:    45 degrees  Adduction:     20 degrees    Special Tests:  Pain w/ forced internal rotation (FADIR):  ++*   Pain w/ forced external rotation (PREETI):  -   Circumduction test:     -  [facility] test:     -   Log roll:       ++   Snapping hip (internal):    -   Sit-up pain:      +*   Resisted sit-up pain:     +*   Resisted sit-up with adductor contraction pain:  -   Step-down test:     +  Trendelenburg test:     +  Bridge test      +     Extremity Neuro-vascular Examination:   Sensation:  Grossly intact to light touch all dermatomal regions.   Motor Function:  Fully intact motor function at hip, knee, foot and ankle    DTRs;  quadriceps and  achilles 2+.  No clonus and downgoing Babinski.    Vascular status:  DP and PT pulses 2+, brisk capillary refill, symmetric.    Skin:  intact, compartments soft.    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  #2 Tendinosis of lat glut tendons, bilat  Hip pain / LE pain right > left    No evidence of vascular pathology    Imaging studies reviewed:   X-ray lumbar spine 22.04  Mri lumbar spine 22.05  X-ray hip, kev 20.05  EMG 22.07    Plan  We discussed the importance of appropriate diet, weight, and regular exercise    We discussed options including    Watchful waiting / relative rest    Physical therapy x   Injection therapy CSI lat glut tend right   Consultation    The patient chooses As above   x = prescribed  CSI = corticosteroid injection  VSI = viscosupplement injection  PRPI = platelet rich plasma injection  ia = intra articular  R = right  L = left  B = bilateral   nfSx = surgical consultation was recommended, but patient is not  interested in consultation at this time    Physical Therapy        Formal (fPT), @ [facility] facility p   Formal (fPT), @ [facility] facility        Homegoing (hgPT), per concurrent fPT recommendations    Homegoing (hgPT), per prior fPT recommendations t   Homegoing (hgPT), handout provided        w/  (atPT)    [blank] = not prescribed  x = prescribed  b = prescribed, and begin as indicated  t = continue as indicated  r = prescribed, and restart as indicated  p = completed prior as indicated  hs = prescribed, and with high school   col = prescribed, and with college or university   nfPT = physical therapy was recommended, but patient is not interested in PT at this time    Activity (e.g. sports, work) restrictions    [blank] = as tolerated  pt = per physical therapist  at = per     Bracing    [blank] = not prescribed  r = recommended, but not fit with at todays visit  f = prescribed and fit with at todays visit  t = continue as indicated  p = prn use on rare, as-needed basis; advised against chronic use    Pain management mp   [blank] = No prescription necessary. A handout detailing dosing of appropriate   over-the-counter musculoskeletal analgesics was made available to the patient.   m = meloxicam x 14 days  mp = 14 day course of meloxicam prescribed prior    Follow up    [blank] = as needed  [number] = in [number] weeks  CSI = for corticosteroid injection  VSI = for viscosupplement injection or injection series  PRP = for platelet rich plasma injection or injection series  MRI = after MRI imaging  ns = should surgical options be deferred (no surgery)  o = appointment offered, deferred by patient    Should symptoms worsen or fail to resolve, consider    Revisiting the above options and / or Repeat lat hip CSIs   Vs.  TLIF w/ PCelestre     Vocation:    at [employer], works at [employer]  cycling, running

## 2023-01-19 ENCOUNTER — PATIENT MESSAGE (OUTPATIENT)
Dept: DERMATOLOGY | Facility: CLINIC | Age: 38
End: 2023-01-19

## 2023-01-19 ENCOUNTER — OFFICE VISIT (OUTPATIENT)
Dept: DERMATOLOGY | Facility: CLINIC | Age: 38
End: 2023-01-19
Payer: COMMERCIAL

## 2023-01-19 DIAGNOSIS — L70.0 ACNE VULGARIS: ICD-10-CM

## 2023-01-19 PROCEDURE — 1159F PR MEDICATION LIST DOCUMENTED IN MEDICAL RECORD: ICD-10-PCS | Mod: CPTII,95,, | Performed by: DERMATOLOGY

## 2023-01-19 PROCEDURE — 99213 OFFICE O/P EST LOW 20 MIN: CPT | Mod: 95,,, | Performed by: DERMATOLOGY

## 2023-01-19 PROCEDURE — 99213 PR OFFICE/OUTPT VISIT, EST, LEVL III, 20-29 MIN: ICD-10-PCS | Mod: 95,,, | Performed by: DERMATOLOGY

## 2023-01-19 PROCEDURE — 1159F MED LIST DOCD IN RCRD: CPT | Mod: CPTII,95,, | Performed by: DERMATOLOGY

## 2023-01-19 PROCEDURE — 1160F RVW MEDS BY RX/DR IN RCRD: CPT | Mod: CPTII,95,, | Performed by: DERMATOLOGY

## 2023-01-19 PROCEDURE — 1160F PR REVIEW ALL MEDS BY PRESCRIBER/CLIN PHARMACIST DOCUMENTED: ICD-10-PCS | Mod: CPTII,95,, | Performed by: DERMATOLOGY

## 2023-01-19 RX ORDER — CLINDAMYCIN PHOSPHATE 11.9 MG/ML
SOLUTION TOPICAL
Qty: 90 ML | Refills: 11 | Status: SHIPPED | OUTPATIENT
Start: 2023-01-19 | End: 2023-01-19

## 2023-01-19 RX ORDER — CLINDAMYCIN PHOSPHATE 11.9 MG/ML
SOLUTION TOPICAL
Qty: 180 ML | Refills: 11 | Status: SHIPPED | OUTPATIENT
Start: 2023-01-19

## 2023-01-19 NOTE — PROGRESS NOTES
The patient location is: home  The chief complaint leading to consultation is: acne  Visit type: virtual visit with synchronous audio and video  Total time spent with patient: 13 minutes  Each patient to whom I provide medical services by telemedicine is:  (1) informed of the relationship between the physician and patient and the respective role of any other health care provider with respect to management of the patient; and (2) notified that he or she may decline to receive medical services by telemedicine and may withdraw from such care at any time.    Patient Information  Name: Lisa Garcia  : 1985  MRN: [id number]     Referring Physician:  No ref. provider found   Primary Care Physician:  Krishna Cabral MD   Date of Visit: 2023      Subjective:     History of Present lllness:    Lisa Garcia is a 37 y.o. male who presents with a chief complaint of bumps.    Diagnosis: acne vulgaris  Location: scalp  Signs/Symptoms: inflamed, irritated, itching, redness, scabs, tender  Symptom course: improving  Current treatment: Clindamycin solution is the only thing that keeps it at bay    Patient was last seen: 2021.  Prior notes by myself reviewed.   Clinical documentation obtained by nursing staff reviewed.    Review of Systems    Objective:   Physical Exam   Constitutional: He appears well-developed and well-nourished. No distress.   Neurological: He is alert and oriented to person, place, and time. He is not disoriented.   Psychiatric: He has a normal mood and affect.            [] Data reviewed  [] Prior external notes reviewed  [] Independent review of test  [] Management discussed with another provider  [] Independent historian    Assessment / Plan:        Acne vulgaris   - stable and chronic  -     clindamycin (CLEOCIN T) 1 % external solution; APPLY TO AFFECTED AREA TOPICALLY TWICE A DAY  Dispense: 180 mL; Refill: 11    Follow up in about 1 year (around 2024) for follow up, or sooner if  symptoms worsening or not improving.      Birdie Daniel MD, FAAD  [facility]

## 2023-01-27 ENCOUNTER — OFFICE VISIT (OUTPATIENT)
Dept: INTERNAL MEDICINE | Facility: CLINIC | Age: 38
End: 2023-01-27
Payer: COMMERCIAL

## 2023-01-27 ENCOUNTER — CLINICAL SUPPORT (OUTPATIENT)
Dept: INTERNAL MEDICINE | Facility: CLINIC | Age: 38
End: 2023-01-27
Payer: COMMERCIAL

## 2023-01-27 ENCOUNTER — HOSPITAL ENCOUNTER (OUTPATIENT)
Dept: RADIOLOGY | Facility: HOSPITAL | Age: 38
Discharge: HOME | End: 2023-01-27
Attending: INTERNAL MEDICINE
Payer: COMMERCIAL

## 2023-01-27 ENCOUNTER — HOSPITAL ENCOUNTER (OUTPATIENT)
Dept: CARDIOLOGY | Facility: CLINIC | Age: 38
Discharge: HOME | End: 2023-01-27
Payer: COMMERCIAL

## 2023-01-27 VITALS
WEIGHT: 213 LBS | DIASTOLIC BLOOD PRESSURE: 76 MMHG | HEART RATE: 63 BPM | SYSTOLIC BLOOD PRESSURE: 118 MMHG | HEIGHT: 74 IN | BODY MASS INDEX: 27.34 KG/M2

## 2023-01-27 DIAGNOSIS — Z00.00 ANNUAL PHYSICAL EXAM: Primary | ICD-10-CM

## 2023-01-27 DIAGNOSIS — Z00.00 ROUTINE GENERAL MEDICAL EXAMINATION AT A HEALTH CARE FACILITY: ICD-10-CM

## 2023-01-27 DIAGNOSIS — Z00.00 ENCOUNTER FOR ANNUAL HEALTH EXAMINATION: Primary | ICD-10-CM

## 2023-01-27 DIAGNOSIS — Z00.00 ROUTINE GENERAL MEDICAL EXAMINATION AT A HEALTH CARE FACILITY: Primary | ICD-10-CM

## 2023-01-27 LAB
ALBUMIN SERPL BCP-MCNC: 4.3 G/DL (ref 3.5–5.2)
ALP SERPL-CCNC: 50 U/L (ref 55–135)
ALT SERPL W/O P-5'-P-CCNC: 23 U/L (ref 10–44)
ANION GAP SERPL CALC-SCNC: 8 MMOL/L (ref 8–16)
AST SERPL-CCNC: 18 U/L (ref 10–40)
BILIRUB SERPL-MCNC: 0.8 MG/DL (ref 0.1–1)
BUN SERPL-MCNC: 12 MG/DL (ref 6–20)
CALCIUM SERPL-MCNC: 9.7 MG/DL (ref 8.7–10.5)
CHLORIDE SERPL-SCNC: 105 MMOL/L (ref 95–110)
CHOLEST SERPL-MCNC: 186 MG/DL (ref 120–199)
CHOLEST/HDLC SERPL: 3.2 {RATIO} (ref 2–5)
CO2 SERPL-SCNC: 27 MMOL/L (ref 23–29)
CREAT SERPL-MCNC: 1 MG/DL (ref 0.5–1.4)
ERYTHROCYTE [DISTWIDTH] IN BLOOD BY AUTOMATED COUNT: 13 % (ref 11.5–14.5)
EST. GFR  (NO RACE VARIABLE): >60 ML/MIN/1.73 M^2
ESTIMATED AVG GLUCOSE: 100 MG/DL (ref 68–131)
GLUCOSE SERPL-MCNC: 93 MG/DL (ref 70–110)
HBA1C MFR BLD: 5.1 % (ref 4–5.6)
HCT VFR BLD AUTO: 44 % (ref 40–54)
HDLC SERPL-MCNC: 59 MG/DL (ref 40–75)
HDLC SERPL: 31.7 % (ref 20–50)
HGB BLD-MCNC: 14.7 G/DL (ref 14–18)
LDLC SERPL CALC-MCNC: 112.4 MG/DL (ref 63–159)
MCH RBC QN AUTO: 28 PG (ref 27–31)
MCHC RBC AUTO-ENTMCNC: 33.4 G/DL (ref 32–36)
MCV RBC AUTO: 84 FL (ref 82–98)
NONHDLC SERPL-MCNC: 127 MG/DL
PLATELET # BLD AUTO: 184 K/UL (ref 150–450)
PMV BLD AUTO: 11.2 FL (ref 9.2–12.9)
POTASSIUM SERPL-SCNC: 4.3 MMOL/L (ref 3.5–5.1)
PROT SERPL-MCNC: 7.6 G/DL (ref 6–8.4)
RBC # BLD AUTO: 5.25 M/UL (ref 4.6–6.2)
SODIUM SERPL-SCNC: 140 MMOL/L (ref 136–145)
TRIGL SERPL-MCNC: 73 MG/DL (ref 30–150)
TSH SERPL DL<=0.005 MIU/L-ACNC: 1.29 UIU/ML (ref 0.4–4)
WBC # BLD AUTO: 4.76 K/UL (ref 3.9–12.7)

## 2023-01-27 PROCEDURE — 85027 COMPLETE CBC AUTOMATED: CPT | Performed by: INTERNAL MEDICINE

## 2023-01-27 PROCEDURE — 3074F SYST BP LT 130 MM HG: CPT | Mod: CPTII,S$GLB,, | Performed by: INTERNAL MEDICINE

## 2023-01-27 PROCEDURE — 97750 PHYSICAL PERFORMANCE TEST: CPT | Mod: S$GLB,,, | Performed by: INTERNAL MEDICINE

## 2023-01-27 PROCEDURE — 84443 ASSAY THYROID STIM HORMONE: CPT | Performed by: INTERNAL MEDICINE

## 2023-01-27 PROCEDURE — 3044F PR MOST RECENT HEMOGLOBIN A1C LEVEL <7.0%: ICD-10-PCS | Mod: CPTII,S$GLB,, | Performed by: INTERNAL MEDICINE

## 2023-01-27 PROCEDURE — 3078F DIAST BP <80 MM HG: CPT | Mod: CPTII,S$GLB,, | Performed by: INTERNAL MEDICINE

## 2023-01-27 PROCEDURE — 93005 ELECTROCARDIOGRAM TRACING: CPT | Mod: S$GLB,,, | Performed by: INTERNAL MEDICINE

## 2023-01-27 PROCEDURE — 93010 EKG 12-LEAD: ICD-10-PCS | Mod: S$GLB,,, | Performed by: INTERNAL MEDICINE

## 2023-01-27 PROCEDURE — 36415 COLL VENOUS BLD VENIPUNCTURE: CPT | Performed by: INTERNAL MEDICINE

## 2023-01-27 PROCEDURE — 3074F PR MOST RECENT SYSTOLIC BLOOD PRESSURE < 130 MM HG: ICD-10-PCS | Mod: CPTII,S$GLB,, | Performed by: INTERNAL MEDICINE

## 2023-01-27 PROCEDURE — 99999 PR PBB SHADOW E&M-EST. PATIENT-LVL I: CPT | Mod: PBBFAC,,,

## 2023-01-27 PROCEDURE — 71046 X-RAY EXAM CHEST 2 VIEWS: CPT | Mod: TC,FY

## 2023-01-27 PROCEDURE — 3008F BODY MASS INDEX DOCD: CPT | Mod: CPTII,S$GLB,, | Performed by: INTERNAL MEDICINE

## 2023-01-27 PROCEDURE — 99395 PR PREVENTIVE VISIT,EST,18-39: ICD-10-PCS | Mod: S$GLB,,, | Performed by: INTERNAL MEDICINE

## 2023-01-27 PROCEDURE — 71046 X-RAY EXAM CHEST 2 VIEWS: CPT | Mod: 26,,, | Performed by: RADIOLOGY

## 2023-01-27 PROCEDURE — 3044F HG A1C LEVEL LT 7.0%: CPT | Mod: CPTII,S$GLB,, | Performed by: INTERNAL MEDICINE

## 2023-01-27 PROCEDURE — 83036 HEMOGLOBIN GLYCOSYLATED A1C: CPT | Performed by: INTERNAL MEDICINE

## 2023-01-27 PROCEDURE — 3008F PR BODY MASS INDEX (BMI) DOCUMENTED: ICD-10-PCS | Mod: CPTII,S$GLB,, | Performed by: INTERNAL MEDICINE

## 2023-01-27 PROCEDURE — 97802 PR MED NUTR THER, 1ST, INDIV, EA 15 MIN: ICD-10-PCS | Mod: S$GLB,,,

## 2023-01-27 PROCEDURE — 93005 EKG 12-LEAD: ICD-10-PCS | Mod: S$GLB,,, | Performed by: INTERNAL MEDICINE

## 2023-01-27 PROCEDURE — 99999 PR PBB SHADOW E&M-EST. PATIENT-LVL III: ICD-10-PCS | Mod: PBBFAC,,, | Performed by: INTERNAL MEDICINE

## 2023-01-27 PROCEDURE — 99999 PR PBB SHADOW E&M-EST. PATIENT-LVL I: ICD-10-PCS | Mod: PBBFAC,,,

## 2023-01-27 PROCEDURE — 80061 LIPID PANEL: CPT | Performed by: INTERNAL MEDICINE

## 2023-01-27 PROCEDURE — 97750 PR PHYSICAL PERFORMANCE TEST: ICD-10-PCS | Mod: S$GLB,,, | Performed by: INTERNAL MEDICINE

## 2023-01-27 PROCEDURE — 3078F PR MOST RECENT DIASTOLIC BLOOD PRESSURE < 80 MM HG: ICD-10-PCS | Mod: CPTII,S$GLB,, | Performed by: INTERNAL MEDICINE

## 2023-01-27 PROCEDURE — 80053 COMPREHEN METABOLIC PANEL: CPT | Performed by: INTERNAL MEDICINE

## 2023-01-27 PROCEDURE — 93010 ELECTROCARDIOGRAM REPORT: CPT | Mod: S$GLB,,, | Performed by: INTERNAL MEDICINE

## 2023-01-27 PROCEDURE — 99395 PREV VISIT EST AGE 18-39: CPT | Mod: S$GLB,,, | Performed by: INTERNAL MEDICINE

## 2023-01-27 PROCEDURE — 97802 MEDICAL NUTRITION INDIV IN: CPT | Mod: S$GLB,,,

## 2023-01-27 PROCEDURE — 71046 XR CHEST PA AND LATERAL: ICD-10-PCS | Mod: 26,,, | Performed by: RADIOLOGY

## 2023-01-27 PROCEDURE — 99999 PR PBB SHADOW E&M-EST. PATIENT-LVL III: CPT | Mod: PBBFAC,,, | Performed by: INTERNAL MEDICINE

## 2023-01-27 NOTE — PROGRESS NOTES
Subjective:       Patient ID: Lisa Garcia is a 37 y.o. male.    Chief Complaint: [employer]      HPI  Annual health exam. Reviewed medical, surgical, social and family history, medications, appropriate preventive health screenings, as well as vaccination history. Updates as noted below or in assessment and plan.     wellness exam through work ([employer]). Moving to [place] for work with summer. Will likely be there around 3 yrs.  Hx lumbar djd with left sciatica, right rotator cuff arthropathy which are still issues but stable. Recently getting right steroid injection for bursitis. Using Diclofenac bid prn, typically uses qd. Does get some stomach upset with it at times. Rarely uses Robaxin for back pain.  Just got back into tennis. Last night started feeling some posterior right elbow pain which is present today still, mostly with extension of the elbow. No acute trauma but feels it probably came on after a serve.    Review of Systems   All other systems reviewed and are negative.    Past Medical History:   Diagnosis Date    Alopecia of scalp 2009    DDD (degenerative disc disease), lumbar     L4/5    Ganglion cyst     Left hand, b/t 1st/2nd MCP.    Greater trochanteric bursitis, right     Patellofemoral pain syndrome of left knee     Tendinosis of rotator cuff     Right         Current Outpatient Medications:     clindamycin (CLEOCIN T) 1 % external solution, APPLY TO AFFECTED AREA TOPICALLY TWICE A DAY, Disp: 180 mL, Rfl: 11    diclofenac (VOLTAREN) 75 MG EC tablet, TAKE 1 TABLET BY MOUTH TWICE A DAY, Disp: 60 tablet, Rfl: 0    methocarbamoL (ROBAXIN) 750 MG Tab, Take 500 mg by mouth 4 (four) times daily., Disp: , Rfl:     Past Surgical History:   Procedure Laterality Date    APPENDECTOMY      EPIDURAL STEROID INJECTION N/A 8/17/2022    Procedure: LUMBAR L4/5 MITCH CONTRAST DIRECT REFERRAL;  Surgeon: Zurdo Henry MD;  Location: [facility];  Service: Pain Management;  Laterality: N/A;     TRANSFORAMINAL EPIDURAL INJECTION OF STEROID Bilateral 5/25/2022    Procedure: INJECTION, STEROID, EPIDURAL, TF BILATERAL L4-L5 CONTRAST DIRECT REF;  Surgeon: Zurdo Henry MD;  Location: [facility];  Service: Pain Management;  Laterality: Bilateral;       Family History   Problem Relation Age of Onset    Osteoporosis Father         s/p bilat hip replacement    Hypothyroidism Mother         hashimotos    Lung cancer Paternal Grandfather         smoker/filterless cig    Colon cancer Neg Hx     Melanoma Neg Hx     Prostate cancer Neg Hx        Social History     Tobacco Use    Smoking status: Former     Types: Cigars    Smokeless tobacco: Never   Substance Use Topics    Alcohol use: Yes     Comment: Social, scotch, bourbon    Drug use: Yes     Types: Marijuana     Comment: rare       Immunization History   Administered Date(s) Administered    COVID-19, MRNA, LN-S, PF (MODERNA FULL 0.5 ML DOSE) 03/09/2021, 04/06/2021    COVID-19, MRNA, LN-S, PF (Pfizer) (Purple Cap) 12/16/2021    Influenza 10/09/2019, 11/09/2020    Td (Adult), Unspecified Formulation 05/19/2015    Tdap 01/24/2022         Objective:      Vitals:    01/27/23 1017   BP: 118/76   Pulse: 63       Physical Exam  Constitutional:       General: He is not in acute distress.     Appearance: Normal appearance. He is well-developed. He is not ill-appearing.   HENT:      Head: Normocephalic and atraumatic.      Right Ear: Hearing and tympanic membrane normal. There is no impacted cerumen.      Left Ear: Hearing and tympanic membrane normal. There is no impacted cerumen.      Nose: Nose normal.      Mouth/Throat:      Mouth: Mucous membranes are moist.      Pharynx: Oropharynx is clear.   Eyes:      Extraocular Movements: Extraocular movements intact.      Conjunctiva/sclera: Conjunctivae normal.      Pupils: Pupils are equal, round, and reactive to light.   Neck:      Vascular: No carotid bruit.   Cardiovascular:      Rate and Rhythm: Normal rate and  regular rhythm.      Heart sounds: Normal heart sounds. No murmur heard.  Pulmonary:      Effort: Pulmonary effort is normal. No respiratory distress.      Breath sounds: Normal breath sounds. No wheezing, rhonchi or rales.   Abdominal:      General: Abdomen is flat. There is no distension.      Palpations: Abdomen is soft. There is no mass.      Tenderness: There is no abdominal tenderness.      Hernia: No hernia is present.   Musculoskeletal:         General: Tenderness (Right posterior elbow at insertion of triceps.) present. No swelling, deformity or signs of injury. Normal range of motion.      Cervical back: No tenderness.      Right lower leg: No edema.      Left lower leg: No edema.   Lymphadenopathy:      Cervical: No cervical adenopathy.   Skin:     General: Skin is warm and dry.      Findings: No lesion or rash.   Neurological:      General: No focal deficit present.      Mental Status: He is alert and oriented to person, place, and time.      Cranial Nerves: No cranial nerve deficit.      Coordination: Coordination normal.      Gait: Gait normal.   Psychiatric:         Mood and Affect: Mood normal.         Behavior: Behavior normal.         Thought Content: Thought content normal.         Judgment: Judgment normal.       Recent Results (from the past 2016 hour(s))   Comprehensive metabolic panel    Collection Time: 01/27/23  8:14 AM   Result Value Ref Range    Sodium 140 136 - 145 mmol/L    Potassium 4.3 3.5 - 5.1 mmol/L    Chloride 105 95 - 110 mmol/L    CO2 27 23 - 29 mmol/L    Glucose 93 70 - 110 mg/dL    BUN 12 6 - 20 mg/dL    Creatinine 1.0 0.5 - 1.4 mg/dL    Calcium 9.7 8.7 - 10.5 mg/dL    Total Protein 7.6 6.0 - 8.4 g/dL    Albumin 4.3 3.5 - 5.2 g/dL    Total Bilirubin 0.8 0.1 - 1.0 mg/dL    Alkaline Phosphatase 50 (L) 55 - 135 U/L    AST 18 10 - 40 U/L    ALT 23 10 - 44 U/L    Anion Gap 8 8 - 16 mmol/L    eGFR >60.0 >60 mL/min/1.73 m^2   CBC Without Differential    Collection Time: 01/27/23  8:14  AM   Result Value Ref Range    WBC 4.76 3.90 - 12.70 K/uL    RBC 5.25 4.60 - 6.20 M/uL    Hemoglobin 14.7 14.0 - 18.0 g/dL    Hematocrit 44.0 40.0 - 54.0 %    MCV 84 82 - 98 fL    MCH 28.0 27.0 - 31.0 pg    MCHC 33.4 32.0 - 36.0 g/dL    RDW 13.0 11.5 - 14.5 %    Platelets 184 150 - 450 K/uL    MPV 11.2 9.2 - 12.9 fL   Lipid panel    Collection Time: 01/27/23  8:14 AM   Result Value Ref Range    Cholesterol 186 120 - 199 mg/dL    Triglycerides 73 30 - 150 mg/dL    HDL 59 40 - 75 mg/dL    LDL Cholesterol 112.4 63.0 - 159.0 mg/dL    HDL/Cholesterol Ratio 31.7 20.0 - 50.0 %    Total Cholesterol/HDL Ratio 3.2 2.0 - 5.0    Non-HDL Cholesterol 127 mg/dL   Hemoglobin A1c    Collection Time: 01/27/23  8:14 AM   Result Value Ref Range    Hemoglobin A1C 5.1 4.0 - 5.6 %    Estimated Avg Glucose 100 68 - 131 mg/dL   TSH    Collection Time: 01/27/23  8:14 AM   Result Value Ref Range    TSH 1.287 0.400 - 4.000 uIU/mL          CXR normal  EKG sinus bradycardia, incomplete RBBB (no concerning changes).    Assessment/Plan:     1) Annual wellness exam  2) Tricipital tendinitis, right - 1 day, started after tennis yesterday. Discussed avoiding heavy strain. Focus on stretching, also band exercises which he has at home. NSAID vs Tylenol prn. Topicals such as Voltaren, Biofreeze or CBD also reasonable.  3) Lumbar DJD - Stable with intermittent left sciatica. MITCH last yr. Overall feels some improvement. Using Tylenol and/or Diclofenac prn. Rarely Robaxin if needed additionally. Some stomach upset at times with Diclofenac. Discussed a daily antacid such as Nexium or Pepcid would be reasonable when using regularly. Focus on extra hydration on days of Diclofenac as well.    - Vaccines up to date.  - Blood pressure, cholesterol and glucose screens normal.

## 2023-01-27 NOTE — PROGRESS NOTES
Subjective:       Patient ID: Lisa Garcia is a 37 y.o. male.    Chief Complaint: No chief complaint on file.    HPI  Pt. Has no significant cardiovascular or pulmonary history.    Physical Limitations:  Patient has degenerative disc disease at L4/L5 that limits his from heavy lifting and practicing back-specific exercises such as dead lifts.  Patient has right shoulder tendonitis that limits him from performing above the head and incline bench lifts.  Patient has an acute right elbow/tricep injury from serving in tennis and chose to defer the push-up test.      Current exercise routine:  Patient currently cycles for 60-90 minutes, 2 days a week.  Patient plays tennis and swims, each for 60 minutes once a week.  Patient practices full body resistance training exercises, 2-3 days a week.  Patient practices daily stretching.     Goals:  Patient set a goal weight between 205 -210 lbs.    Fun Facts:  Patient was very friendly and engaged.  Patient and his wife are moving to [facility] in July where he is being transferred for work.  He is excited about the move and believes that it might be easier to follow a healthier diet while there.  Patient asked questions and was receptive to keep up with his current exercise routine.      Review of Systems      Objective:    The fitness evaluation results are as follows:   D.O.S. 1/27/2023 2/18/2022 2/5/2021   Height (in): 74 74 74   Weight (lbs): 212.9 225.8 227.2   BMI: 27.604317 29.646186 29.611047   Body Fat (%): 19.80 21.00 20.70   Waist (cm): 91 93 96   RBP (mmHg): 108/60 92/60 110/66   RHR (bpm): 60 54 62    Strength Dominant (Lbs): 140 120 130    Strength Non Dominant (Lbs): 150 126.50049 133.64164   Push-up Assessment: Deferred 46 46   Curl-up Assessment: 75 75 75   Flexibility Testing (cm): 22 20 -9   REE (kcals): 2043 2118 2135     Physical Exam    Assessment:     Age/gender stratified assessment:  Resting BP: Within Normal Limits   Body Fat %: Good   Waist  Circumfernece: Low Risk    Strength Dominant: 90th    Strength Non Dominant: 90th   Upper Body Endurance: Deferred   Abdominal Endurance: Well Above Average   Lower body Flexibiltiy: Needs Improvement     Problem List Items Addressed This Visit    None  Visit Diagnoses       Routine general medical examination at a health care facility    -  Primary              Plan:       Recommended fitness guidelines:    -150 minutes of moderate intensity aerobic exercise per week or 75 minutes of vigorous intensity aerobic exercise per week.    -2 to 4 days per week of resistance training for each muscle group.      -Daily stretching with a hold of at least 30 seconds per muscle group.

## 2023-01-27 NOTE — PROGRESS NOTES
"Nutrition Assessment  Session Time:  60 minutes      Client name:  Lisa Garcia  :  1985  Age:  37 y.o.  Gender:  male    Client states:  Client is here for his annual [facility] medical examination. A very pleasant Mr. Garcia is  and works as a  for [employer]. He enjoys his job and is able to manage the stress well. His wife is the director at [employer] [employer] [employer]. He is from [place] and had to adjust to Western style food when he moved here. He and his wife are moving to [facility] this year and he is very excited, but also a little nervous about continuing good eating habits in a new culture. He put on weight leading up to his wedding in  and has made a concentrated effort over the last year or so to eat healthier and drop the weight, which he accomplished. He cut down his alcohol and red meat intake and overall makes more heart healthy choices on a daily basis. He also loves to cook and tries recipes from a Whole 30 type cookbook regularly. He shares that when he does eat some of the less healthy meals that he used to have more regularly, he finds that he experiences stomach upset and they don't make him feel good like the healthier meals. Mr. Garcia is very active and exercises at least 5x per week, with a mixture of cycling, tennis, swimming (in warmer weather) and gym workouts that incorporate cardio and weight strengthening exercises. He is somewhat limited by degenerative disc disease, and has had to get steroid shots as well as an anti-inflammatory prescription to manage the pain. He has a sweet tooth and does not keep desserts in the house. We discussed eating desserts in moderation and finding healthier options that satisfy his sweet tooth rather then denying them completely, which can lead to over consumption when they are available. Client is a stress eater, and we discussed other ways he might manage his stress rather than eating.    Anthropometrics  Height:  74" "     Weight:  212.9 lbs  BMI:  27.4  % Body Fat:  19.8    Clinical Signs/Symptoms  N/V/D:  none  Appetite:  good       Past Medical History:   Diagnosis Date    Alopecia of scalp 2009    DDD (degenerative disc disease), lumbar     L4/5    Ganglion cyst     Left hand, b/t 1st/2nd MCP.    Greater trochanteric bursitis, right     Patellofemoral pain syndrome of left knee     Tendinosis of rotator cuff     Right       Past Surgical History:   Procedure Laterality Date    APPENDECTOMY      EPIDURAL STEROID INJECTION N/A 8/17/2022    Procedure: LUMBAR L4/5 MITCH CONTRAST DIRECT REFERRAL;  Surgeon: Zurdo Henry MD;  Location: [facility];  Service: Pain Management;  Laterality: N/A;    TRANSFORAMINAL EPIDURAL INJECTION OF STEROID Bilateral 5/25/2022    Procedure: INJECTION, STEROID, EPIDURAL, TF BILATERAL L4-L5 CONTRAST DIRECT REF;  Surgeon: Zurdo Henry MD;  Location: [facility];  Service: Pain Management;  Laterality: Bilateral;       Medications    has a current medication list which includes the following prescription(s): clindamycin, diclofenac, gabapentin, hydrocodone-acetaminophen, meclizine, methocarbamol, and multivitamin.    Vitamins, Minerals, and/or Supplements:  probiotic enzymes, omega 3's with vitamin D     Food/Medication Interactions:  Reviewed     Food Allergies or Intolerances:  none     Social History    Marital status:    Employment:  [employer]     Social History     Tobacco Use    Smoking status: Some Days     Types: Cigars    Smokeless tobacco: Never    Tobacco comments:     once every few months   Substance Use Topics    Alcohol use: Yes     Alcohol/week: 5.0 standard drinks     Types: 5 Standard drinks or equivalent per week     Comment: Social, scotch, bourbon        Lab Reports   Sodium   Date Value Ref Range Status   02/18/2022 140 136 - 145 mmol/L Final     Potassium   Date Value Ref Range Status   02/18/2022 4.2 3.5 - 5.1 mmol/L Final     Chloride   Date Value  Ref Range Status   02/18/2022 107 95 - 110 mmol/L Final     CO2   Date Value Ref Range Status   02/18/2022 25 23 - 29 mmol/L Final     Glucose   Date Value Ref Range Status   02/18/2022 105 70 - 110 mg/dL Final     BUN   Date Value Ref Range Status   02/18/2022 14 6 - 20 mg/dL Final     Creatinine   Date Value Ref Range Status   02/18/2022 1.0 0.5 - 1.4 mg/dL Final     Calcium   Date Value Ref Range Status   02/18/2022 9.2 8.7 - 10.5 mg/dL Final     Total Protein   Date Value Ref Range Status   02/18/2022 7.0 6.0 - 8.4 g/dL Final     Albumin   Date Value Ref Range Status   02/18/2022 3.9 3.5 - 5.2 g/dL Final     Total Bilirubin   Date Value Ref Range Status   02/18/2022 0.7 0.1 - 1.0 mg/dL Final     Comment:     For infants and newborns, interpretation of results should be based  on gestational age, weight and in agreement with clinical  observations.    Premature Infant recommended reference ranges:  Up to 24 hours.............<8.0 mg/dL  Up to 48 hours............<12.0 mg/dL  3-5 days..................<15.0 mg/dL  6-29 days.................<15.0 mg/dL       Alkaline Phosphatase   Date Value Ref Range Status   02/18/2022 47 (L) 55 - 135 U/L Final     AST   Date Value Ref Range Status   02/18/2022 22 10 - 40 U/L Final     ALT   Date Value Ref Range Status   02/18/2022 21 10 - 44 U/L Final     Anion Gap   Date Value Ref Range Status   02/18/2022 8 8 - 16 mmol/L Final     eGFR if    Date Value Ref Range Status   02/18/2022 >60.0 >60 mL/min/1.73 m^2 Final     eGFR if non    Date Value Ref Range Status   02/18/2022 >60.0 >60 mL/min/1.73 m^2 Final     Comment:     Calculation used to obtain the estimated glomerular filtration  rate (eGFR) is the CKD-EPI equation.         Lab Results   Component Value Date    WBC 4.76 01/27/2023    HGB 14.7 01/27/2023    HCT 44.0 01/27/2023    MCV 84 01/27/2023     01/27/2023        Lab Results   Component Value Date    CHOL 172 02/18/2022     Lab  Results   Component Value Date    HDL 47 02/18/2022     Lab Results   Component Value Date    LDLCALC 110.4 02/18/2022     Lab Results   Component Value Date    TRIG 73 02/18/2022     Lab Results   Component Value Date    CHOLHDL 27.3 02/18/2022     Lab Results   Component Value Date    HGBA1C 5.1 02/18/2022     BP Readings from Last 1 Encounters:   01/12/23 120/76       Food History  Breakfast:  [employer] protein shake with almond butter, almond milk, fiber supplement, frozen blueberries, banana, albina seeds, oats; 1 cup coffee with oat milk  Mid-morning Snack:  unsalted roasted almonds with dates  Lunch:  Good Kitchen frozen meal (protein/vegetable/carb) OR leftovers from dinner OR a salad with chicken or fish  Mid-afternoon Snack:  apple or protein bar  Dinner:  usually low carb: a protein like fish or chicken with a salad or vegetables; sometimes a poke bowl; pizza once in a while  Snack:  not usually, sometimes a few squares dark chocolate or dark chocolate covered almonds  *Fluid intake:  water, coffee  Alcohol: 1 bourbon between 2-4x per week    Exercise History:  Patient currently cycles for 60-90 minutes, 2 days a week.  Patient plays tennis and swims, each for 60 minutes once a week.  Patient practices full body resistance training exercises, 2-3 days a week.  Patient practices daily stretching.     Cultural/Spiritual/Personal Preferences:  None identified    Support System:  spouse    State of Change:  Action    Barriers to Change:  client is somewhat limited in movement due to DDD    Diagnosis    Other: No nutrition diagnosis at this time .    Intervention    RMR (Method:  InBody):  2043 kcal  Activity Factor:  1.3    VICKY:  2656 kcal    Goals:  1.  Continue exercise routine at least 5 days a week, incorporating swimming, cycling, tennis and weight strengthening exercises.  2.  Make an effort to [place] new [facility] and consider signing up for a cooking class.  3.  When feeling stressed, channel  stress into dreaming about and planning for travel adventures.  4. When having an afternoon snack, aim for a protein and/or healthy fat with a carbohydrate rather than having carbohydrate alone.    Nutrition Education  The following education was provided to the patient:  Complimented patient on dietary compliance/modifications and resulting health improvements.  Complimented patient on physical activity efforts.  Discussed healthy snacking.   Discussed weight management.  Suggested dietary modifications based on current dietary behaviors and individual food preferences.  Discussed macronutrient distribution among meals and snacks, including CHO, protein, and fat recommendations and its importance/benefits.  *Lab results were pending at time of consult and so, not discussed with patient.  Discussed [facility] client resources (may include but not limited to [facility] Eat Fit Shopping List, Fueling Well on the Go, Lite Restaurant Guide, and Meal Planning Guide).  Discussed goal setting.  Discussed the pros and cons of fad diets and provided recommendations regarding short and long term adherence if applicable.  Provided ongoing support, encouragement, and guidance toward improved health efforts.    Patient verbalized understanding of nutrition education and recommendations received.    Handouts Provided  Meal Planning Guide  Restaurant Guide  Eat Fit Shopping List  Eat Fit Virginie  Fueling Well On-The-Go    Monitoring/Evaluation    Monitor the following:  Weight  BMI  % Body Fat  Caloric intake  Labs:  none    Follow Up Plan:  Communication with referring healthcare provider is unnecessary at this time as patient presented as part of annual wellness exam.  However, will follow up with patient in 1-2 years.

## 2023-04-05 ENCOUNTER — OFFICE VISIT (OUTPATIENT)
Dept: SPORTS MEDICINE | Facility: CLINIC | Age: 38
End: 2023-04-05
Payer: COMMERCIAL

## 2023-04-05 VITALS
SYSTOLIC BLOOD PRESSURE: 112 MMHG | HEART RATE: 66 BPM | BODY MASS INDEX: 27.59 KG/M2 | HEIGHT: 74 IN | DIASTOLIC BLOOD PRESSURE: 77 MMHG | WEIGHT: 215 LBS

## 2023-04-05 DIAGNOSIS — M25.551 CHRONIC PAIN OF RIGHT HIP: Primary | ICD-10-CM

## 2023-04-05 DIAGNOSIS — M67.80 TENDINOSIS: ICD-10-CM

## 2023-04-05 DIAGNOSIS — S76.011S TEAR OF RIGHT GLUTEUS MINIMUS TENDON, SEQUELA: ICD-10-CM

## 2023-04-05 DIAGNOSIS — G89.29 CHRONIC PAIN OF RIGHT HIP: Primary | ICD-10-CM

## 2023-04-05 DIAGNOSIS — S76.011S TEAR OF RIGHT GLUTEUS MEDIUS TENDON, SEQUELA: ICD-10-CM

## 2023-04-05 PROCEDURE — 1159F PR MEDICATION LIST DOCUMENTED IN MEDICAL RECORD: ICD-10-PCS | Mod: CPTII,S$GLB,, | Performed by: FAMILY MEDICINE

## 2023-04-05 PROCEDURE — 99999 PR PBB SHADOW E&M-EST. PATIENT-LVL III: ICD-10-PCS | Mod: PBBFAC,,, | Performed by: FAMILY MEDICINE

## 2023-04-05 PROCEDURE — 3008F PR BODY MASS INDEX (BMI) DOCUMENTED: ICD-10-PCS | Mod: CPTII,S$GLB,, | Performed by: FAMILY MEDICINE

## 2023-04-05 PROCEDURE — 1159F MED LIST DOCD IN RCRD: CPT | Mod: CPTII,S$GLB,, | Performed by: FAMILY MEDICINE

## 2023-04-05 PROCEDURE — 76942 ECHO GUIDE FOR BIOPSY: CPT | Mod: 59,S$GLB,, | Performed by: FAMILY MEDICINE

## 2023-04-05 PROCEDURE — 99214 OFFICE O/P EST MOD 30 MIN: CPT | Mod: 25,S$GLB,, | Performed by: FAMILY MEDICINE

## 2023-04-05 PROCEDURE — 99214 PR OFFICE/OUTPT VISIT, EST, LEVL IV, 30-39 MIN: ICD-10-PCS | Mod: 25,S$GLB,, | Performed by: FAMILY MEDICINE

## 2023-04-05 PROCEDURE — 20551 TENDON ORIGIN: R HIP JOINT: ICD-10-PCS | Mod: RT,S$GLB,, | Performed by: FAMILY MEDICINE

## 2023-04-05 PROCEDURE — 3078F DIAST BP <80 MM HG: CPT | Mod: CPTII,S$GLB,, | Performed by: FAMILY MEDICINE

## 2023-04-05 PROCEDURE — 99999 PR PBB SHADOW E&M-EST. PATIENT-LVL III: CPT | Mod: PBBFAC,,, | Performed by: FAMILY MEDICINE

## 2023-04-05 PROCEDURE — 20551 NJX 1 TENDON ORIGIN/INSJ: CPT | Mod: RT,S$GLB,, | Performed by: FAMILY MEDICINE

## 2023-04-05 PROCEDURE — 3044F PR MOST RECENT HEMOGLOBIN A1C LEVEL <7.0%: ICD-10-PCS | Mod: CPTII,S$GLB,, | Performed by: FAMILY MEDICINE

## 2023-04-05 PROCEDURE — 3078F PR MOST RECENT DIASTOLIC BLOOD PRESSURE < 80 MM HG: ICD-10-PCS | Mod: CPTII,S$GLB,, | Performed by: FAMILY MEDICINE

## 2023-04-05 PROCEDURE — 1160F PR REVIEW ALL MEDS BY PRESCRIBER/CLIN PHARMACIST DOCUMENTED: ICD-10-PCS | Mod: CPTII,S$GLB,, | Performed by: FAMILY MEDICINE

## 2023-04-05 PROCEDURE — 3074F PR MOST RECENT SYSTOLIC BLOOD PRESSURE < 130 MM HG: ICD-10-PCS | Mod: CPTII,S$GLB,, | Performed by: FAMILY MEDICINE

## 2023-04-05 PROCEDURE — 3044F HG A1C LEVEL LT 7.0%: CPT | Mod: CPTII,S$GLB,, | Performed by: FAMILY MEDICINE

## 2023-04-05 PROCEDURE — 1160F RVW MEDS BY RX/DR IN RCRD: CPT | Mod: CPTII,S$GLB,, | Performed by: FAMILY MEDICINE

## 2023-04-05 PROCEDURE — 3074F SYST BP LT 130 MM HG: CPT | Mod: CPTII,S$GLB,, | Performed by: FAMILY MEDICINE

## 2023-04-05 PROCEDURE — 76942 TENDON ORIGIN: R HIP JOINT: ICD-10-PCS | Mod: 59,S$GLB,, | Performed by: FAMILY MEDICINE

## 2023-04-05 PROCEDURE — 3008F BODY MASS INDEX DOCD: CPT | Mod: CPTII,S$GLB,, | Performed by: FAMILY MEDICINE

## 2023-04-05 RX ORDER — TRIAMCINOLONE ACETONIDE 40 MG/ML
40 INJECTION, SUSPENSION INTRA-ARTICULAR; INTRAMUSCULAR
Status: DISCONTINUED | OUTPATIENT
Start: 2023-04-05 | End: 2023-04-05 | Stop reason: HOSPADM

## 2023-04-05 RX ADMIN — TRIAMCINOLONE ACETONIDE 40 MG: 40 INJECTION, SUSPENSION INTRA-ARTICULAR; INTRAMUSCULAR at 11:04

## 2023-04-05 NOTE — PROGRESS NOTES
Lisa Garcia, a 37 y.o. male, is here for evaluation of left low back and hip. He notes good relief of right lat glute tendon injection in 2020. Notes chronic low back pain and left sided radiculopathy that has not responded to MITCH or PT.     HISTORY OF PRESENT ILLNESS   Location: #1 lateral/anterior thigh, left   #2 lumbar spine  Onset: Chronic since 2018  Palliative:    Relative rest   Oral analgesics - Meloxicam (7.5mg) with moderate relief   Healthy back fPT for lumbar spine/hip   fPT, @[facility] date: 05.28.2020, duration: 7 visits, significant Improvement with hip pain, continues to have LBP   CSI gtb r 8/12/2020 good relief    MITCH Dr. Henry 22.05, 22.06, 22.08   CSI lat glut tend left hip, 11/01/22, good relief for 3 weeks    CSI lat glut tend right, 01/12/2023 100% relief for 2 months  Provocative:   ADLs  prolonged sitting  prolonged ambulation   Prior: No hx of Orthopaedic surgery  Progression: worsening discomfort   Quality:    sharp  Radiation: Bilateral LE numbness/tingling when inflammed  Severity: per nursing documentation  Timing: intermittent with use  Trauma: none specific      Review of systems (ROS):  A 10+ review of systems was performed with pertinent positives and negatives noted above in the history of present illness. Other systems were negative unless otherwise specified.      PHYSICAL EXAMINATION  General:  The patient is alert and oriented x 3.  Mood is pleasant.  Observation of ears, eyes and nose reveal no gross abnormalities.  HEENT: NCAT, sclera nonicteric  Lungs: Respirations are equal and unlabored.   Gait is coordinated. Patient can toe walk and heel walk without difficulty.    HIP/PELVIS EXAMINATION    Observation/Inspection  Gait:   Nonantalgic   Alignment:  Neutral   Scars:   None   Muscle atrophy: None   Effusion:  None   Warmth:  None   Discoloration:   None   Leg lengths:   Equal   Pelvis:    Level     Tenderness/Crepitus (T/C):      T / C  Trochanteric bursa   ++ /  -  Piriformis    - / -  SI joint    - / -  Psoas tendon   - / -  Rectus insertion  - / -  Adductor insertion  - / -  Pubic symphysis  - / -    ROM: (* = pain)    Flexion:      120 degrees*  External rotation:   40 degrees  Internal rotation with axial load:  10 degrees*  Internal rotation without axial load:  10 degrees*  Abduction:    45 degrees  Adduction:     20 degrees    Special Tests:  Pain w/ forced internal rotation (FADIR):  ++*   Pain w/ forced external rotation (PREETI):  -   Circumduction test:     -  Stinchfield test:     -   Log roll:       ++   Snapping hip (internal):    -   Sit-up pain:      +*   Resisted sit-up pain:     +*   Resisted sit-up with adductor contraction pain:  -   Step-down test:     +  Trendelenburg test:     +  Bridge test      +     Extremity Neuro-vascular Examination:   Sensation:  Grossly intact to light touch all dermatomal regions.   Motor Function:  Fully intact motor function at hip, knee, foot and ankle    DTRs;  quadriceps and  achilles 2+.  No clonus and downgoing Babinski.    Vascular status:  DP and PT pulses 2+, brisk capillary refill, symmetric.    Skin:  intact, compartments soft.    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  #2 Tendinosis of lat glut tendons, bilat  Hip pain / LE pain right > left    No evidence of vascular pathology    Imaging studies reviewed:   X-ray lumbar spine 22.04  Mri lumbar spine 22.05  X-ray hip, kev 20.05  EMG 22.07    Plan  We discussed the importance of appropriate diet, weight, and regular exercise    We discussed options including    Watchful waiting / relative rest    Physical therapy x   Injection therapy CSI lat glut tend right   Consultation    The patient chooses As above   x = prescribed  CSI = corticosteroid injection  VSI = viscosupplement injection  PRPI = platelet rich plasma injection  ia = intra articular  R = right  L = left  B = bilateral    nfSx = surgical consultation was recommended, but patient is not interested in consultation at this time    Physical Therapy        Formal (fPT), @ [facility] facility p   Formal (fPT), @ [facility] facility        Homegoing (hgPT), per concurrent fPT recommendations    Homegoing (hgPT), per prior fPT recommendations t   Homegoing (hgPT), handout provided        w/  (atPT)    [blank] = not prescribed  x = prescribed  b = prescribed, and begin as indicated  t = continue as indicated  r = prescribed, and restart as indicated  p = completed prior as indicated  hs = prescribed, and with high school   col = prescribed, and with college or university   nfPT = physical therapy was recommended, but patient is not interested in PT at this time    Activity (e.g. sports, work) restrictions    [blank] = as tolerated  pt = per physical therapist  at = per     Bracing    [blank] = not prescribed  r = recommended, but not fit with at todays visit  f = prescribed and fit with at todays visit  t = continue as indicated  p = prn use on rare, as-needed basis; advised against chronic use    Pain management mp   [blank] = No prescription necessary. A handout detailing dosing of appropriate   over-the-counter musculoskeletal analgesics was made available to the patient.   m = meloxicam x 14 days  mp = 14 day course of meloxicam prescribed prior    Follow up    [blank] = as needed  [number] = in [number] weeks  CSI = for corticosteroid injection  VSI = for viscosupplement injection or injection series  PRP = for platelet rich plasma injection or injection series  MRI = after MRI imaging  ns = should surgical options be deferred (no surgery)  o = appointment offered, deferred by patient    Should symptoms worsen or fail to resolve, consider    Revisiting the above options and / or Repeat lat hip CSIs   Vs.  TLIF w/ PCelestre     Vocation:    at [employer], works at  desk  cycling, running

## 2023-08-02 ENCOUNTER — PATIENT MESSAGE (OUTPATIENT)
Dept: SPORTS MEDICINE | Facility: CLINIC | Age: 38
End: 2023-08-02
Payer: COMMERCIAL

## 2023-08-09 RX ORDER — DICLOFENAC SODIUM 75 MG/1
75 TABLET, DELAYED RELEASE ORAL 2 TIMES DAILY
Qty: 60 TABLET | Refills: 0 | Status: SHIPPED | OUTPATIENT
Start: 2023-08-09

## (undated) DEVICE — DRESSING LEUKOPLAST FLEX 1X3IN